# Patient Record
Sex: MALE | Race: WHITE | NOT HISPANIC OR LATINO | URBAN - METROPOLITAN AREA
[De-identification: names, ages, dates, MRNs, and addresses within clinical notes are randomized per-mention and may not be internally consistent; named-entity substitution may affect disease eponyms.]

---

## 2020-05-07 ENCOUNTER — INPATIENT (INPATIENT)
Facility: HOSPITAL | Age: 65
LOS: 7 days | Discharge: PSYCHIATRIC OR SUBSTANCE USE FACILITY | DRG: 641 | End: 2020-05-15
Attending: FAMILY MEDICINE | Admitting: HOSPITALIST
Payer: COMMERCIAL

## 2020-05-07 VITALS
DIASTOLIC BLOOD PRESSURE: 76 MMHG | RESPIRATION RATE: 18 BRPM | TEMPERATURE: 98 F | OXYGEN SATURATION: 95 % | HEART RATE: 119 BPM | SYSTOLIC BLOOD PRESSURE: 107 MMHG | WEIGHT: 160.06 LBS

## 2020-05-07 LAB
ALBUMIN SERPL ELPH-MCNC: 2.8 G/DL — LOW (ref 3.3–5)
ALP SERPL-CCNC: 39 U/L — LOW (ref 40–120)
ALT FLD-CCNC: 22 U/L — SIGNIFICANT CHANGE UP (ref 12–78)
ANION GAP SERPL CALC-SCNC: 11 MMOL/L — SIGNIFICANT CHANGE UP (ref 5–17)
APAP SERPL-MCNC: <2 UG/ML — LOW (ref 10–30)
AST SERPL-CCNC: 20 U/L — SIGNIFICANT CHANGE UP (ref 15–37)
BILIRUB SERPL-MCNC: 2.2 MG/DL — HIGH (ref 0.2–1.2)
BUN SERPL-MCNC: 24 MG/DL — HIGH (ref 7–23)
CALCIUM SERPL-MCNC: 7.6 MG/DL — LOW (ref 8.5–10.1)
CHLORIDE SERPL-SCNC: 111 MMOL/L — HIGH (ref 96–108)
CK MB CFR SERPL CALC: 1.6 NG/ML — SIGNIFICANT CHANGE UP (ref 0–3.6)
CO2 SERPL-SCNC: 21 MMOL/L — LOW (ref 22–31)
CREAT SERPL-MCNC: 1.2 MG/DL — SIGNIFICANT CHANGE UP (ref 0.5–1.3)
ETHANOL SERPL-MCNC: <10 MG/DL — SIGNIFICANT CHANGE UP (ref 0–10)
GLUCOSE SERPL-MCNC: 85 MG/DL — SIGNIFICANT CHANGE UP (ref 70–99)
LACTATE SERPL-SCNC: 1.9 MMOL/L — SIGNIFICANT CHANGE UP (ref 0.7–2)
LIDOCAIN IGE QN: 183 U/L — SIGNIFICANT CHANGE UP (ref 73–393)
MAGNESIUM SERPL-MCNC: 2.2 MG/DL — SIGNIFICANT CHANGE UP (ref 1.6–2.6)
POTASSIUM SERPL-MCNC: 3.8 MMOL/L — SIGNIFICANT CHANGE UP (ref 3.5–5.3)
POTASSIUM SERPL-SCNC: 3.8 MMOL/L — SIGNIFICANT CHANGE UP (ref 3.5–5.3)
PROT SERPL-MCNC: 6 G/DL — SIGNIFICANT CHANGE UP (ref 6–8.3)
SALICYLATES SERPL-MCNC: <1.7 MG/DL — LOW (ref 2.8–20)
SARS-COV-2 RNA SPEC QL NAA+PROBE: SIGNIFICANT CHANGE UP
SODIUM SERPL-SCNC: 143 MMOL/L — SIGNIFICANT CHANGE UP (ref 135–145)
TROPONIN I SERPL-MCNC: 0.02 NG/ML — SIGNIFICANT CHANGE UP (ref 0.01–0.04)

## 2020-05-07 PROCEDURE — 71250 CT THORAX DX C-: CPT | Mod: 26

## 2020-05-07 PROCEDURE — 70450 CT HEAD/BRAIN W/O DYE: CPT | Mod: 26

## 2020-05-07 PROCEDURE — 93010 ELECTROCARDIOGRAM REPORT: CPT

## 2020-05-07 PROCEDURE — 72125 CT NECK SPINE W/O DYE: CPT | Mod: 26

## 2020-05-07 PROCEDURE — 74176 CT ABD & PELVIS W/O CONTRAST: CPT | Mod: 26

## 2020-05-07 PROCEDURE — 99285 EMERGENCY DEPT VISIT HI MDM: CPT

## 2020-05-07 PROCEDURE — 99223 1ST HOSP IP/OBS HIGH 75: CPT | Mod: GC

## 2020-05-07 RX ORDER — SODIUM CHLORIDE 9 MG/ML
1000 INJECTION INTRAMUSCULAR; INTRAVENOUS; SUBCUTANEOUS ONCE
Refills: 0 | Status: COMPLETED | OUTPATIENT
Start: 2020-05-07 | End: 2020-05-07

## 2020-05-07 RX ADMIN — SODIUM CHLORIDE 1000 MILLILITER(S): 9 INJECTION INTRAMUSCULAR; INTRAVENOUS; SUBCUTANEOUS at 18:23

## 2020-05-07 NOTE — ED PROVIDER NOTE - CONSTITUTIONAL, MLM
normal... awake, alert, oriented to person, place, time/situation and in no apparent distress. dry mucous membrane unkept

## 2020-05-07 NOTE — ED ADULT NURSE REASSESSMENT NOTE - NS ED NURSE REASSESS COMMENT FT1
Pt is on a 1:1 for suicidal ideation. Pt is in tears and feels depressed. Pt is awaiting labs to be drawn and pending CT scan. VSS will reassess.

## 2020-05-07 NOTE — ED PROVIDER NOTE - OBJECTIVE STATEMENT
Pt is a 63 yo male with no known pmhx has not seen doctor in 30 years c/o of chills and generalized weakness for one month. Pt states doesn't have thermometer to check temperature denies any cough sob + diarrhea. Pt states he fell twice 3 weeks ago + dizziness denies any numbness tingling. Pt states he hasn't eaten in 2 days friends called to check on him and called 911. Pt states he has no covid exposures has not left the house for weeks. Pt c/o of headache.     pcp none

## 2020-05-07 NOTE — ED ADULT NURSE NOTE - OBJECTIVE STATEMENT
PT BIB EMS c/c of frequent falls, generalized weakness and general decline x 1 month. LLE decreased sensation. Per patient also notes feeling increasingly more depressed, w/ SI x weeks. Denies plan and psych history. 1:1 initiated, MD Aragon made aware. Room safety reviewed w/ both patient and 1:1 at bedside.

## 2020-05-07 NOTE — ED PROVIDER NOTE - CLINICAL SUMMARY MEDICAL DECISION MAKING FREE TEXT BOX
Pt is a 65 yo male with chills weakness will get ekg labs ct scan head neck chest abdomen will give fluids covid ordered

## 2020-05-07 NOTE — ED PROVIDER NOTE - ATTENDING CONTRIBUTION TO CARE
Pt seen and examined and d/w PA.  agree with a and p.  pt is a 63 yo male denies hx but hasn't seen md in 30 years. pt lives alone and states hasn't left house for month and friends have been bringing food. pt states now with few weeks of ha, chills and sob with no cough, doesn't have thermometer. pt also co falls and ? syncope x 1 a few days ago.  pts friends called him today and he didn't sound right so they called 911. pt states diffuse weakness.  denies cp.  on exam, pt upset, no resp distress and alert, oriented x 3.  nc/at, perrla, conj pink, neck supple, nt, lungs cta, cor: tachy s1s2, no mgr, abd with rlq ttp, no rash, neuro intact,  check ekg,  ivf, ro infection, check ua, labs,  covid.  pt also with syncope, check ct head, neck, trop,  rlq ttp, check ct abd,  reassess after results, likely admit as lives alone.

## 2020-05-07 NOTE — ED PROVIDER NOTE - CARE PLAN
Principal Discharge DX:	Syncope and collapse  Secondary Diagnosis:	Diarrhea, unspecified type  Secondary Diagnosis:	Weakness  Secondary Diagnosis:	Suicidal ideation

## 2020-05-08 DIAGNOSIS — Z90.49 ACQUIRED ABSENCE OF OTHER SPECIFIED PARTS OF DIGESTIVE TRACT: Chronic | ICD-10-CM

## 2020-05-08 DIAGNOSIS — L84 CORNS AND CALLOSITIES: ICD-10-CM

## 2020-05-08 DIAGNOSIS — F43.21 ADJUSTMENT DISORDER WITH DEPRESSED MOOD: ICD-10-CM

## 2020-05-08 DIAGNOSIS — D11.0 BENIGN NEOPLASM OF PAROTID GLAND: Chronic | ICD-10-CM

## 2020-05-08 DIAGNOSIS — R45.851 SUICIDAL IDEATIONS: ICD-10-CM

## 2020-05-08 DIAGNOSIS — Z29.9 ENCOUNTER FOR PROPHYLACTIC MEASURES, UNSPECIFIED: ICD-10-CM

## 2020-05-08 DIAGNOSIS — Z98.890 OTHER SPECIFIED POSTPROCEDURAL STATES: Chronic | ICD-10-CM

## 2020-05-08 DIAGNOSIS — R53.1 WEAKNESS: ICD-10-CM

## 2020-05-08 DIAGNOSIS — E80.6 OTHER DISORDERS OF BILIRUBIN METABOLISM: ICD-10-CM

## 2020-05-08 DIAGNOSIS — F32.9 MAJOR DEPRESSIVE DISORDER, SINGLE EPISODE, UNSPECIFIED: ICD-10-CM

## 2020-05-08 DIAGNOSIS — R55 SYNCOPE AND COLLAPSE: ICD-10-CM

## 2020-05-08 DIAGNOSIS — I82.431 ACUTE EMBOLISM AND THROMBOSIS OF RIGHT POPLITEAL VEIN: ICD-10-CM

## 2020-05-08 DIAGNOSIS — B35.1 TINEA UNGUIUM: ICD-10-CM

## 2020-05-08 DIAGNOSIS — B07.0 PLANTAR WART: ICD-10-CM

## 2020-05-08 DIAGNOSIS — I82.409 ACUTE EMBOLISM AND THROMBOSIS OF UNSPECIFIED DEEP VEINS OF UNSPECIFIED LOWER EXTREMITY: ICD-10-CM

## 2020-05-08 LAB
AMPHET UR-MCNC: NEGATIVE — SIGNIFICANT CHANGE UP
APPEARANCE UR: ABNORMAL
BARBITURATES UR SCN-MCNC: NEGATIVE — SIGNIFICANT CHANGE UP
BENZODIAZ UR-MCNC: NEGATIVE — SIGNIFICANT CHANGE UP
BILIRUB UR-MCNC: ABNORMAL
COCAINE METAB.OTHER UR-MCNC: NEGATIVE — SIGNIFICANT CHANGE UP
COLOR SPEC: YELLOW — SIGNIFICANT CHANGE UP
DIFF PNL FLD: ABNORMAL
GLUCOSE UR QL: NEGATIVE — SIGNIFICANT CHANGE UP
KETONES UR-MCNC: ABNORMAL
LEUKOCYTE ESTERASE UR-ACNC: ABNORMAL
METHADONE UR-MCNC: NEGATIVE — SIGNIFICANT CHANGE UP
NITRITE UR-MCNC: NEGATIVE — SIGNIFICANT CHANGE UP
OPIATES UR-MCNC: NEGATIVE — SIGNIFICANT CHANGE UP
PCP SPEC-MCNC: SIGNIFICANT CHANGE UP
PCP UR-MCNC: NEGATIVE — SIGNIFICANT CHANGE UP
PH UR: 5 — SIGNIFICANT CHANGE UP (ref 5–8)
PROT UR-MCNC: 30 MG/DL
SP GR SPEC: 1.02 — SIGNIFICANT CHANGE UP (ref 1.01–1.02)
THC UR QL: NEGATIVE — SIGNIFICANT CHANGE UP
UROBILINOGEN FLD QL: 4

## 2020-05-08 PROCEDURE — 11721 DEBRIDE NAIL 6 OR MORE: CPT | Mod: 59

## 2020-05-08 PROCEDURE — 99233 SBSQ HOSP IP/OBS HIGH 50: CPT

## 2020-05-08 PROCEDURE — 93970 EXTREMITY STUDY: CPT | Mod: 26

## 2020-05-08 PROCEDURE — 99253 IP/OBS CNSLTJ NEW/EST LOW 45: CPT

## 2020-05-08 PROCEDURE — 93880 EXTRACRANIAL BILAT STUDY: CPT | Mod: 26

## 2020-05-08 PROCEDURE — 99222 1ST HOSP IP/OBS MODERATE 55: CPT | Mod: 25

## 2020-05-08 PROCEDURE — 99223 1ST HOSP IP/OBS HIGH 75: CPT

## 2020-05-08 PROCEDURE — 11055 PARING/CUTG B9 HYPRKER LES 1: CPT

## 2020-05-08 RX ORDER — ENOXAPARIN SODIUM 100 MG/ML
40 INJECTION SUBCUTANEOUS DAILY
Refills: 0 | Status: DISCONTINUED | OUTPATIENT
Start: 2020-05-08 | End: 2020-05-08

## 2020-05-08 RX ORDER — MIRTAZAPINE 45 MG/1
15 TABLET, ORALLY DISINTEGRATING ORAL AT BEDTIME
Refills: 0 | Status: DISCONTINUED | OUTPATIENT
Start: 2020-05-08 | End: 2020-05-11

## 2020-05-08 RX ORDER — ENOXAPARIN SODIUM 100 MG/ML
70 INJECTION SUBCUTANEOUS EVERY 12 HOURS
Refills: 0 | Status: DISCONTINUED | OUTPATIENT
Start: 2020-05-08 | End: 2020-05-09

## 2020-05-08 RX ADMIN — MIRTAZAPINE 15 MILLIGRAM(S): 45 TABLET, ORALLY DISINTEGRATING ORAL at 21:45

## 2020-05-08 RX ADMIN — ENOXAPARIN SODIUM 70 MILLIGRAM(S): 100 INJECTION SUBCUTANEOUS at 17:24

## 2020-05-08 NOTE — H&P ADULT - NSICDXFAMILYHX_GEN_ALL_CORE_FT
FAMILY HISTORY:  Father  Still living? No  Family history of CVA, Age at diagnosis: Age Unknown  Family history of myocardial infarction, Age at diagnosis: Age Unknown  Family history of type 2 diabetes mellitus, Age at diagnosis: Age Unknown

## 2020-05-08 NOTE — H&P ADULT - PROBLEM SELECTOR PLAN 4
Patient without any known hx of psychiatric pathology, now with what appears to be severe depressive episode  with admitted suicidal ideation, poor PO intake, unable to perform daily activities, and difficulty sleeping  -PHQ9 score 27 - indicative of severe depression, patient may require pharmacologic intervention  -Has anxiety regarding multiple life stressors - living situation, money, recent loss of sister  -Psych (Dr Conley) consulted - f/u recs

## 2020-05-08 NOTE — H&P ADULT - PROBLEM SELECTOR PLAN 2
Patient presenting with chills, weakness, occasional diarrhea since 3/30/20, after visiting a pharmacy, but has had extremely limited outside contact since that time as he has not left home  -COVID19 PCR negative and COVID ruled out

## 2020-05-08 NOTE — H&P ADULT - PROBLEM SELECTOR PLAN 6
Patient noted to have overgrown toenails of b/l feet, thick and discolored, c/w onychomycosis  -patient will likely require assistance in trimming toenails  -Podiatry consulted, f/u recs

## 2020-05-08 NOTE — H&P ADULT - NSHPPHYSICALEXAM_GEN_ALL_CORE
Vital Signs Last 24 Hrs  T(C): 37.2 (07 May 2020 23:33), Max: 37.2 (07 May 2020 23:33)  T(F): 98.9 (07 May 2020 23:33), Max: 98.9 (07 May 2020 23:33)  HR: 104 (07 May 2020 23:33) (104 - 119)  BP: 92/62 (07 May 2020 23:33) (92/62 - 107/76)  BP(mean): --  RR: 18 (07 May 2020 23:33) (18 - 18)  SpO2: 93% (07 May 2020 23:33) (93% - 95%)    Physical Exam:  General: unkempt, well nourished, NAD  HEENT: NCAT, PERRLA, EOMI bl, moist mucous membranes   Neck: Supple, nontender, no mass  Neurology: A&Ox3, nonfocal, CN II-XII grossly intact, sensation diminished b/l feet to light touch, Motor 4/5 RLE, 5/5 LLE  Respiratory: CTA B/L, No W/R/R  CV: RRR, +S1/S2, no murmurs, rubs or gallops  Abdominal: Soft, NT, ND +BSx4  Extremities: No LE edema, 2+ peripheral pulses b/l radial, DP, PT. B/l toenails overgrown, thickened, discolored. Plantar surface of the right 3rd toe with hyperkeratotic lesion consistent with veruca plantaris  MSK: Normal ROM, no joint erythema or warmth, no joint swelling   Skin: warm, dry,

## 2020-05-08 NOTE — CONSULT NOTE ADULT - PROBLEM SELECTOR RECOMMENDATION 2
I have discussed the treatment options with the patient and have debrided the lesion full thickness, recommended use of Vaseline or similar product to decrease friction.  If these conservative measures fail to relieve symptoms we could always correct the deformity surgically and will discuss that option if necessary.

## 2020-05-08 NOTE — BEHAVIORAL HEALTH ASSESSMENT NOTE - NSBHCHARTREVIEWINVESTIGATE_PSY_A_CORE FT
< from: 12 Lead ECG (05.07.20 @ 17:29) >    Ventricular Rate 113 BPM    Atrial Rate 113 BPM    P-R Interval 130 ms    QRS Duration 72 ms    Q-T Interval 368 ms    QTC Calculation(Bezet) 504 ms    P Axis -1 degrees    R Axis 0 degrees    T Axis 0 degrees    Diagnosis Line Sinus tachycardia  Junctional ST depression, probably normal  Borderline ECG  No previous ECGs available  Confirmed by Rosibel MOBLEY, Aneudy ([id number]) on 5/8/2020 1:36:50 PM    < end of copied text >

## 2020-05-08 NOTE — DIETITIAN INITIAL EVALUATION ADULT. - OTHER INFO
64/M without any significant known PMHx who presented to the ED c/o worsening weakness over the past 1 month with associated chills, and syncope, admitted for r/o COVID19 and syncope workup. Also found to have depression and suicidal ideation with significant social concerns.  Pt lives alone in an apartment.  Pt does not cook or shop for food.  All meals have historically been eaten out or brought back to home.  [employer], [employer], Italian restaurants, etc.  Due to COVID pandemic pt has not been able to go out for food.  When asked why he didn't have food delivered, he stated deliverymen are not able to get into his building.  He lives on the 2nd floor and there is locked area that they cannot get past.  He has been unable to walk far to get out.  Said  brought him a box of Cheerios and he ate that sometimes.  Drinks a lot of water.  Eventually just stopped wanting to eat much. Pt does not know current wt. Stated thinks he weighed ~190# at one point last year. Cannot discern if overall weight loss was gradual or more in recent months due to environmental /social issues brought about by the pandemic.  Pt appears disheveled, overgrown beard.  No obvious signs of muscle wasting, no LE edema.  Would like additional pt wt to verify admit wt.

## 2020-05-08 NOTE — CONSULT NOTE ADULT - ASSESSMENT
Right leg DVT probably provoked related to immobility.    No evidence of malignancy on scans  family history notable for sister that may have past away from PE after a extremity stent procedure?  Depression with ?suicidal ideation    Recommendations:  1.  continue lovenox and would bridge to DOAC when stable.  However concern with compliance and followup and risks of toxicity  2.  because of family history would consider hypercoaguability workup as an outpatient  3.  with first event and current history would treat for 3 months with repeat evaluation with doppler and didimer  4.  further heme recommendations pending above

## 2020-05-08 NOTE — H&P ADULT - PROBLEM SELECTOR PLAN 8
IMPROVE VTE Individual Risk Assessment          RISK                                                          Points  [  ] Previous VTE                                                3  [  ] Thrombophilia                                            2  [ x ] Lower limb paralysis                                  2        (unable to hold up >15 seconds)    [  ] Current Cancer                                            2         (within 6 months)  [  ] Immobilization > 24 hrs                             1  [  ] ICU/CCU stay > 24 hours                           1  [ x ] Age > 60                                                        1    IMPROVE VTE Score: 3  moderate risk, start on pharm VTE ppx with lovenox 40mg qd  -Social work consult - living situation, possible need for medicare  -PT consult  -nutritional services consult

## 2020-05-08 NOTE — H&P ADULT - NSHPREVIEWOFSYSTEMS_GEN_ALL_CORE
Constitutional: denies fever, diaphoresis, admits to chills  HEENT: denies blurry vision, difficulty hearing  Respiratory: denies SOB, PARDO, cough, sputum production, wheezing, hemoptysis  Cardiovascular: denies chest pain, palpitations, edema  Gastrointestinal: denies nausea, vomiting, constipation, abdominal pain, melena, hematochezia, admits to occasional diarrhea   Genitourinary: denies dysuria, frequency, urgency, hematuria   Skin/Breast: denies rash, itching  Musculoskeletal: denies myalgias, joint swelling, admits to muscle weakness  Neurologic: denies headache, admits to LE weakness, paresthesias of the toes, dizziness  Psychiatric: Admits to feeling anxious, depressed, suicidal, denies homicidal thoughts  Hematology/Oncology: denies bruising, tender or enlarged lymph nodes   ROS negative except as noted above

## 2020-05-08 NOTE — CONSULT NOTE ADULT - SUBJECTIVE AND OBJECTIVE BOX
Chief Complaint:  Patient is a 64y old  Male who presents with a chief complaint of Weakness (08 May 2020 07:46)    No pertinent past medical history  History of vocal cord polypectomy  Benign parotid tumor  History of appendectomy     HPI:  Patient is a 64/M without any significant known PMHx who presented to the ED c/o worsening weakness over the past 1 month with associated chills that he reports began on 3/30/20. Since that time he reports progressive weakness, fatigue, laying in bed for the majority of the day, difficulty with ambulation, and poor PO intake as he reports he no longer felt he had the desire to eat. He denies any associated SOB, cough, PARDO, but reports difficulty with ambulation as he feels his legs are weak. He reports a fall approx 2 weeks ago which occurred in his apartment which was preceded by dizziness which was described as a feeling of unsteadiness, in which he lost consciousness for an unknown period of time and awoke on the floor. denies any preceding palpitations, SOB, or chest pain. Of note, the patient has not been evaluated by a physician in approx 30 years. He attributes his dizziness and syncope to poor PO intake, often citing during the interview that he has "lost the will to live," and would "be better off dead." He denies any concrete plan to commit suicide, and denies any access to firearms in the home. He reports occasional diarrhea, approx 1x/week, but also has normal formed stools, currently denies any diarrhea. Denies any abdominal pain, cramping, nausea or vomiting. Denies any recent travel or sick contacts.     The date the pt first felt unwell: 3/30/2020  Fever or chills: yes [x  ] chills   no [  ]   - Tmax:   Fatigue, malaise or generalized weakness: yes [ x ]   no [  ]  Shortness of breath/dyspnea: yes [  ]   no [ x ]  Cough: yes [  ]   no [ x ], sputum production: yes [  ]   no [  ]  Blood in sputum: yes [  ]   no [ x ]  Anorexia/po intolerance: yes [x  ]   no [  ]  Chest pain or chest tightness: yes [  ]   no [ x ]  Edema in legs: yes [  ]   no [x  ]  Myalgias: yes [  ]   no [x  ]  Headache: yes [  ]   no [x  ]  Sore throat: yes [  ]   no [ x ]  Rhinorrhea: yes [  ]   no [x  ]  Abd pain: yes [  ]   no [x  ]  Nausea: yes [  ]   no [x  ]  Vomiting: yes [  ]   no [x  ]  Diarrhea: yes [x  ] occasional   no [  ]  Skin rashes: yes [  ]   no [ x ]  Loss of sense of smell/anosmia: yes [  ]   no [ x ]  Conjunctivitis: yes [  ]   no [ x ]  Recent travel: yes [  ]   no [ x ] - Location:   Any sick contacts: yes [  ]   no [ x ]  Close contact with someone confirmed positive with COVID-19 / SARS-CoV2 in the last 14 days: yes [  ]   no [x  ]  Code status: Full    ED Vitals:   T 98.9  BP 92/62 RR 18 SpO2 93% on RA  Labs: CBC wnl, No electrolyte abnormalities, TBili 2.2, troponin negative x 1, blood EtOH, tylenol, ASA level wnl, COVID19 PCr negative.  CT Chest/AP: No traumatic injury. Extensive nonobstructing left renal stones, including a partial staghorn calculus.  CT Head: No acute intracranial hemorrhage  CT C spine: Degenerative changes, no acute fracture  EKG: Sinus tachycardia at 110  In the ED Patient received 2L NS bolus (08 May 2020 00:50)      gi consulted for elevated lfts. chart reviewed. pt seen and examined      recent vs/labs/imaging reviewed - afebrile tachy soft bps  plts 140  no coags   tbili 2.2 not fractionated  utox/etoh/apap/salicylate neg  c19  neg  nc ct ap as below    No Known Allergies      enoxaparin Injectable 40 milliGRAM(s) SubCutaneous daily        FAMILY HISTORY:  Family history of CVA (Father)  Family history of myocardial infarction (Father)  Family history of type 2 diabetes mellitus (Father)        Review of Systems:    General:  weakness  Eyes:  Good vision, no reported pain  ENT:  No sore throat, pain, runny nose, dysphagia  CV:  No pain, palpitations, no lightheadedness  Resp:  No dyspnea, cough, tachypnea, wheezing  GI: see above  :  No pain, bleeding, incontinence, nocturia  Muscle:  No pain, weakness  Neuro:  No weakness, tingling, memory problems  Psych:  No fatigue, insomnia, mood problems, depression  Endocrine:  No polyuria, polydypsia, cold/heat intolerance  Heme:  No petechiae, ecchymosis, easy bruisability  Skin:  No rash, tattoos, scars, edema    Relevant Family History:   n/c    Relevant Social History: n/c      Physical Exam:    Vital Signs:  Vital Signs Last 24 Hrs  T(C): 36.6 (08 May 2020 05:07), Max: 37.2 (07 May 2020 23:33)  T(F): 97.9 (08 May 2020 05:07), Max: 98.9 (07 May 2020 23:33)  HR: 94 (08 May 2020 07:41) (94 - 119)  BP: 96/68 (08 May 2020 05:07) (92/62 - 107/76)  BP(mean): --  RR: 18 (08 May 2020 05:07) (18 - 18)  SpO2: 99% (08 May 2020 07:41) (93% - 99%)  Daily     Daily     General:  Appears stated age, well-groomed, nad  HEENT:  NC/AT,  conjunctivae clear and pink, no thyromegaly, nodules, adenopathy, no JVD  Chest:  Full & symmetric excursion, no increased effort, breath sounds clear  Cardiovascular:  Regular rhythm, S1, S2, no murmur/rub/S3/S4, no abdominal bruit, no edema  Abdomen:  Soft, non-tender, non-distended, normoactive bowel sounds,  no masses ,no hepatosplenomeagaly, no signs of chronic liver disease  Extremities:  no cyanosis,clubbing or edema  Skin:  No rash/erythema/ecchymoses/petechiae/wounds/abscess/warm/dry  Neuro/Psych:  A&O  , no asterixis, no tremor, no encephalopathy    Laboratory:                            14.4   10.48 )-----------( 140      ( 07 May 2020 20:12 )             41.7     05-07    143  |  111<H>  |  24<H>  ----------------------------<  85  3.8   |  21<L>  |  1.20    Ca    7.6<L>      07 May 2020 20:12  Mg     2.2     05-07    TPro  6.0  /  Alb  2.8<L>  /  TBili  2.2<H>  /  DBili  x   /  AST  20  /  ALT  22  /  AlkPhos  39<L>  05-07    LIVER FUNCTIONS - ( 07 May 2020 20:12 )  Alb: 2.8 g/dL / Pro: 6.0 g/dL / ALK PHOS: 39 U/L / ALT: 22 U/L / AST: 20 U/L / GGT: x             Urinalysis Basic - ( 08 May 2020 04:04 )    Color: Yellow / Appearance: Turbid / S.020 / pH: x  Gluc: x / Ketone: Small  / Bili: Small / Urobili: 4   Blood: x / Protein: 30 mg/dL / Nitrite: Negative   Leuk Esterase: Trace / RBC: 11-25 /HPF / WBC 3-5   Sq Epi: x / Non Sq Epi: Negative / Bacteria: Few      Amylase Serum--      Lipase [id number]       Ammonia--    Imaging:    < from: CT Abdomen and Pelvis No Cont (20 @ 20:32) >    EXAM:  CT CHEST                          EXAM:  CT ABDOMEN AND PELVIS                            PROCEDURE DATE:  2020          INTERPRETATION:  CLINICAL INFORMATION: Status post fall    COMPARISON: None.    PROCEDURE:   CT of the Chest, Abdomen and Pelvis was performed without intravenous contrast.   Intravenous contrast: None.  Oral contrast: None.  Sagittal and coronal reformats were performed.    FINDINGS:    CHEST:     LUNGS AND LARGE AIRWAYS: Patent central airways. No pulmonary nodules. Mild bilateral lower lobe subsegmental atelectasis.  PLEURA: No pleural effusion.  VESSELS: Normal caliber aorta. Mild coronary artery calcification.  HEART: Heart size is normal. No pericardial effusion.  MEDIASTINUM AND SELVIN: No lymphadenopathy.  CHEST WALL AND LOWER NECK: Within normal limits.    ABDOMEN AND PELVIS:    LIVER: Within normal limits.  BILE DUCTS: Normal caliber.  GALLBLADDER: Multiple gallstones.  SPLEEN: Within normal limits.  PANCREAS: Within normal limits.  ADRENALS: Within normal limits.  KIDNEYS/URETERS: 1.9 cm nonobstructing left upper pole renal stone. Partial staghorn left renal calculus with multiple small stones versus fragmented larger stone in the left renal pelvis. No hydronephrosis. 2.7 cm hypodense lesion in the posterior aspect of the left kidney which is unable to be characterized on this unenhanced CT scan.    BLADDER: Within normal limits.  REPRODUCTIVE ORGANS: Prostate within normal limits.    BOWEL: No bowel obstruction. Appendix not identified. Mild colonic diverticulosis.  PERITONEUM: No ascites or hemoperitoneum.  VESSELS: Mild atherosclerotic arterial calcifications.  RETROPERITONEUM/LYMPH NODES: No lymphadenopathy.    ABDOMINAL WALL: Within normal limits.  BONES: No fractures.    IMPRESSION:     No traumatic injury.  Extensive nonobstructing left renal stones, including a partial staghorn calculus.                    WILLIAMS GARDNER M.D.,ATTENDING RADIOLOGIST  This document has been electronically signed. May  7 2020  9:05PM                < end of copied text > Chief Complaint:  Patient is a 64y old  Male who presents with a chief complaint of Weakness (08 May 2020 07:46)    No pertinent past medical history  History of vocal cord polypectomy  Benign parotid tumor  History of appendectomy     HPI:  Patient is a 64/M without any significant known PMHx who presented to the ED c/o worsening weakness over the past 1 month with associated chills that he reports began on 3/30/20. Since that time he reports progressive weakness, fatigue, laying in bed for the majority of the day, difficulty with ambulation, and poor PO intake as he reports he no longer felt he had the desire to eat. He denies any associated SOB, cough, PARDO, but reports difficulty with ambulation as he feels his legs are weak. He reports a fall approx 2 weeks ago which occurred in his apartment which was preceded by dizziness which was described as a feeling of unsteadiness, in which he lost consciousness for an unknown period of time and awoke on the floor. denies any preceding palpitations, SOB, or chest pain. Of note, the patient has not been evaluated by a physician in approx 30 years. He attributes his dizziness and syncope to poor PO intake, often citing during the interview that he has "lost the will to live," and would "be better off dead." He denies any concrete plan to commit suicide, and denies any access to firearms in the home. He reports occasional diarrhea, approx 1x/week, but also has normal formed stools, currently denies any diarrhea. Denies any abdominal pain, cramping, nausea or vomiting. Denies any recent travel or sick contacts.     The date the pt first felt unwell: 3/30/2020  Fever or chills: yes [x  ] chills   no [  ]   - Tmax:   Fatigue, malaise or generalized weakness: yes [ x ]   no [  ]  Shortness of breath/dyspnea: yes [  ]   no [ x ]  Cough: yes [  ]   no [ x ], sputum production: yes [  ]   no [  ]  Blood in sputum: yes [  ]   no [ x ]  Anorexia/po intolerance: yes [x  ]   no [  ]  Chest pain or chest tightness: yes [  ]   no [ x ]  Edema in legs: yes [  ]   no [x  ]  Myalgias: yes [  ]   no [x  ]  Headache: yes [  ]   no [x  ]  Sore throat: yes [  ]   no [ x ]  Rhinorrhea: yes [  ]   no [x  ]  Abd pain: yes [  ]   no [x  ]  Nausea: yes [  ]   no [x  ]  Vomiting: yes [  ]   no [x  ]  Diarrhea: yes [x  ] occasional   no [  ]  Skin rashes: yes [  ]   no [ x ]  Loss of sense of smell/anosmia: yes [  ]   no [ x ]  Conjunctivitis: yes [  ]   no [ x ]  Recent travel: yes [  ]   no [ x ] - Location:   Any sick contacts: yes [  ]   no [ x ]  Close contact with someone confirmed positive with COVID-19 / SARS-CoV2 in the last 14 days: yes [  ]   no [x  ]  Code status: Full    ED Vitals:   T 98.9  BP 92/62 RR 18 SpO2 93% on RA  Labs: CBC wnl, No electrolyte abnormalities, TBili 2.2, troponin negative x 1, blood EtOH, tylenol, ASA level wnl, COVID19 PCr negative.  CT Chest/AP: No traumatic injury. Extensive nonobstructing left renal stones, including a partial staghorn calculus.  CT Head: No acute intracranial hemorrhage  CT C spine: Degenerative changes, no acute fracture  EKG: Sinus tachycardia at 110  In the ED Patient received 2L NS bolus (08 May 2020 00:50)      gi consulted for elevated lfts. chart reviewed. pt seen and examined. came to hospital for weakness. w prior diarrhea but now resolved. does endorse ~10# wt loss over past few months 2/ dec po. denies any prior liver/gb dz, recent travel, otc/herbal meds, etoh use. denies n/v/c/abd pain/no overt melena/brbpr. has never had egd/colon. hx of appy. fhx nc for gi tract sheri. meds nc. denies toxic habits. upon eval, emotional but states he feels a little better, going to attempt to eat breakfast      recent vs/labs/imaging reviewed - afebrile tachy soft bps  plts 140  no coags   tbili 2.2 not fractionated  utox/etoh/apap/salicylate neg  c19  neg  nc ct ap as below    No Known Allergies      enoxaparin Injectable 40 milliGRAM(s) SubCutaneous daily        FAMILY HISTORY:  Family history of CVA (Father)  Family history of myocardial infarction (Father)  Family history of type 2 diabetes mellitus (Father)        Review of Systems:    General:  weakness wt loss dec po  Eyes:  Good vision, no reported pain  ENT:  No sore throat, pain, runny nose, dysphagia  CV:  No pain, palpitations, no lightheadedness  Resp:  No dyspnea, cough, tachypnea, wheezing  GI: see above  :  No pain, bleeding, incontinence, nocturia  Muscle:  No pain, weakness  Neuro:  No weakness, tingling, memory problems  Psych:  No fatigue, insomnia, mood problems, depression  Endocrine:  No polyuria, polydypsia, cold/heat intolerance  Heme:  No petechiae, ecchymosis, easy bruisability  Skin:  No rash, tattoos, scars, edema    Relevant Family History:   n/c    Relevant Social History: n/c      Physical Exam:    Vital Signs:  Vital Signs Last 24 Hrs  T(C): 36.6 (08 May 2020 05:07), Max: 37.2 (07 May 2020 23:33)  T(F): 97.9 (08 May 2020 05:07), Max: 98.9 (07 May 2020 23:33)  HR: 94 (08 May 2020 07:41) (94 - 119)  BP: 96/68 (08 May 2020 05:07) (92/62 - 107/76)  BP(mean): --  RR: 18 (08 May 2020 05:07) (18 - 18)  SpO2: 99% (08 May 2020 07:41) (93% - 99%)  Daily     Daily     General:  nad  HEENT:  NC/AT  Abdomen:  Soft, non-tender, mild dt  Extremities:  no edema  Neuro/Psych:  A&Ox3    Laboratory:                            14.4   10.48 )-----------( 140      ( 07 May 2020 20:12 )             41.7     05-07    143  |  111<H>  |  24<H>  ----------------------------<  85  3.8   |  21<L>  |  1.20    Ca    7.6<L>      07 May 2020 20:12  Mg     2.2         TPro  6.0  /  Alb  2.8<L>  /  TBili  2.2<H>  /  DBili  x   /  AST  20  /  ALT  22  /  AlkPhos  39<L>  05-07    LIVER FUNCTIONS - ( 07 May 2020 20:12 )  Alb: 2.8 g/dL / Pro: 6.0 g/dL / ALK PHOS: 39 U/L / ALT: 22 U/L / AST: 20 U/L / GGT: x             Urinalysis Basic - ( 08 May 2020 04:04 )    Color: Yellow / Appearance: Turbid / S.020 / pH: x  Gluc: x / Ketone: Small  / Bili: Small / Urobili: 4   Blood: x / Protein: 30 mg/dL / Nitrite: Negative   Leuk Esterase: Trace / RBC: 11-25 /HPF / WBC 3-5   Sq Epi: x / Non Sq Epi: Negative / Bacteria: Few      Amylase Serum--      Lipase [id number]       Ammonia--    Imaging:    < from: CT Abdomen and Pelvis No Cont (20 @ 20:32) >    EXAM:  CT CHEST                          EXAM:  CT ABDOMEN AND PELVIS                            PROCEDURE DATE:  2020          INTERPRETATION:  CLINICAL INFORMATION: Status post fall    COMPARISON: None.    PROCEDURE:   CT of the Chest, Abdomen and Pelvis was performed without intravenous contrast.   Intravenous contrast: None.  Oral contrast: None.  Sagittal and coronal reformats were performed.    FINDINGS:    CHEST:     LUNGS AND LARGE AIRWAYS: Patent central airways. No pulmonary nodules. Mild bilateral lower lobe subsegmental atelectasis.  PLEURA: No pleural effusion.  VESSELS: Normal caliber aorta. Mild coronary artery calcification.  HEART: Heart size is normal. No pericardial effusion.  MEDIASTINUM AND SELVIN: No lymphadenopathy.  CHEST WALL AND LOWER NECK: Within normal limits.    ABDOMEN AND PELVIS:    LIVER: Within normal limits.  BILE DUCTS: Normal caliber.  GALLBLADDER: Multiple gallstones.  SPLEEN: Within normal limits.  PANCREAS: Within normal limits.  ADRENALS: Within normal limits.  KIDNEYS/URETERS: 1.9 cm nonobstructing left upper pole renal stone. Partial staghorn left renal calculus with multiple small stones versus fragmented larger stone in the left renal pelvis. No hydronephrosis. 2.7 cm hypodense lesion in the posterior aspect of the left kidney which is unable to be characterized on this unenhanced CT scan.    BLADDER: Within normal limits.  REPRODUCTIVE ORGANS: Prostate within normal limits.    BOWEL: No bowel obstruction. Appendix not identified. Mild colonic diverticulosis.  PERITONEUM: No ascites or hemoperitoneum.  VESSELS: Mild atherosclerotic arterial calcifications.  RETROPERITONEUM/LYMPH NODES: No lymphadenopathy.    ABDOMINAL WALL: Within normal limits.  BONES: No fractures.    IMPRESSION:     No traumatic injury.  Extensive nonobstructing left renal stones, including a partial staghorn calculus.                    WILLIAMS GARDNER M.D.,ATTENDING RADIOLOGIST  This document has been electronically signed. May  7 2020  9:05PM                < end of copied text >

## 2020-05-08 NOTE — CONSULT NOTE ADULT - ASSESSMENT
Patient admitted with progressive weakness and failure to thrive, possibly from a recent viral infection although COVID testing is negative. He had a possible episode of syncope which sounds vasovagal/dehydration related several weeks ago. He has no evidence for an acute cardiac process and no evidence for dysrhythmias or valvular disease. He has a documented right deep venous thrombosis which appears unrelated to his clinical presentation.    Recommend    Continuing anticoagulation for deep venous thrombosis     neurology evaluation    Continue telemetry    Await echocardiography to evaluate for any underlying structural heart disease    Further management can be dependent on his clinical course

## 2020-05-08 NOTE — BEHAVIORAL HEALTH ASSESSMENT NOTE - NSBHCHARTREVIEWIMAGING_PSY_A_CORE FT
< from: CT Chest No Cont (05.07.20 @ 20:34) >    MPRESSION:     No traumatic injury.  Extensive nonobstructing left renal stones, including a partial staghorn calculus.                    WILLIAMS GARDNER M.D.,ATTENDING RADIOLOGIST  This document has been electronically signed. May  7 2020  9:05PM    < end of copied text >

## 2020-05-08 NOTE — CONSULT NOTE ADULT - PROBLEM SELECTOR RECOMMENDATION 9
The offending nail margins were mechanically and electrically debrided to the level of normal underlying nail bed with good relief obtained. The patient was instructed to attempt to maintain the length and thickness of their nails as best as possible.

## 2020-05-08 NOTE — CONSULT NOTE ADULT - SUBJECTIVE AND OBJECTIVE BOX
HPI:  64y year old Male seen at [facility] [id number] W1 for elongated, dystrophic, discolored nails, as well as plantar verruca to the right foot. Patient relates he has not seen a podiatrist in a long time and denies any other complaints at this time. Denies any fever, chills, nausea, vomiting, chest pain, shortness of breath, or calf pain at this time.    Review of Systems:  Constitutional: denies fever, diaphoresis, admits to chills  	HEENT: denies blurry vision, difficulty hearing  	Respiratory: denies SOB, PARDO, cough, sputum production, wheezing, hemoptysis  	Cardiovascular: denies chest pain, palpitations, edema  	Gastrointestinal: denies nausea, vomiting, constipation, abdominal pain, melena, hematochezia, admits to occasional diarrhea   	Genitourinary: denies dysuria, frequency, urgency, hematuria   	Skin/Breast: denies rash, itching  	Musculoskeletal: denies myalgias, joint swelling, admits to muscle weakness  	Neurologic: denies headache, admits to LE weakness, paresthesias of the toes, dizziness  	Psychiatric: Admits to feeling anxious, depressed, suicidal, denies homicidal thoughts  	Hematology/Oncology: denies bruising, tender or enlarged lymph nodes   ROS negative except as noted above    PAST MEDICAL & SURGICAL HISTORY:  No pertinent past medical history  History of vocal cord polypectomy  Benign parotid tumor: 1983  History of appendectomy: 1965    Allergies  No Known Allergies    MEDICATIONS  (STANDING):  enoxaparin Injectable 70 milliGRAM(s) SubCutaneous every 12 hours  mirtazapine 15 milliGRAM(s) Oral at bedtime  PARoxetine 20 milliGRAM(s) Oral daily    MEDICATIONS  (PRN):    Social History:  Tobacco: never smoker  EtOH: denies  Recreational drug use: denies  Lives: alone, in apartment, never   Occupation: [employer] (retired 2012)  Ambulates/ADLs: independently, without assistive devices at baseline  Vaccinations: never received seasonal flu, PNA, shingles vaccines  No recent travel (08 May 2020 00:50)    FAMILY HISTORY:  Family history of CVA (Father)  Family history of myocardial infarction (Father)  Family history of type 2 diabetes mellitus (Father)    Vital Signs Last 24 Hrs  T(C): 36.5 (08 May 2020 12:49), Max: 37.2 (07 May 2020 23:33)  T(F): 97.7 (08 May 2020 12:49), Max: 98.9 (07 May 2020 23:33)  HR: 103 (08 May 2020 12:49) (94 - 119)  BP: 96/60 (08 May 2020 12:49) (92/62 - 107/76)  BP(mean): --  RR: 18 (08 May 2020 12:49) (18 - 18)  SpO2: 94% (08 May 2020 12:49) (93% - 99%)      PHYSICAL EXAM:  Vascular: DP & PT palpable bilaterally, Capillary refill 3 seconds, Digital hair present bilaterally  Neurological: Light touch sensation intact bilaterally  Musculoskeletal: 5/5 strength in all quadrants bilaterally, AJ & STJ ROM intact  Dermatological: Digital nails 1-5 bilaterally are elongated, thickened, ridged, lysing, and present with friable subungual debris which, after debridement to underlying nail bed, reveals a characteristic fungal/yeast/mold odor and consistency. There is no surrounding cellulitis, deep incurvation, or evidence of bacterial infection. Hyperkeratotic lesion noted to plantar aspect of the right 3rd digit with no punctate bleeding/thrombotic capillaries upon debridement, no pain on side to side compression and pain with direct palpation.                          14.4   10.48 )-----------( 140      ( 07 May 2020 20:12 )             41.7     05-07    143  |  111<H>  |  24<H>  ----------------------------<  85  3.8   |  21<L>  |  1.20    Ca    7.6<L>      07 May 2020 20:12  Mg     2.2     05-07    TPro  6.0  /  Alb  2.8<L>  /  TBili  2.2<H>  /  DBili  x   /  AST  20  /  ALT  22  /  AlkPhos  39<L>  05-07

## 2020-05-08 NOTE — H&P ADULT - NSICDXPASTSURGICALHX_GEN_ALL_CORE_FT
PAST SURGICAL HISTORY:  Benign parotid tumor 1983    History of appendectomy 1965    History of vocal cord polypectomy

## 2020-05-08 NOTE — DIETITIAN INITIAL EVALUATION ADULT. - PROBLEM SELECTOR PLAN 1
Patient presenting with worsening weakness, fatigue, over the past 1 month with 2 admitted falls in his home approx 2 weeks prior to admission with associated LOC and preceding dizziness. No associated chest pain, palpitations, SOB, vision changes  -Patient attributes to Poor PO intake. Has not occurred in ~ 2 weeks  -S/p 2L NS bolus in ED, encourage PO hydration  -CT head negative for ICH or gross structural abnormality, CT cspine without acute fracture  -Labs wnl, unlikely metabolic component.   -Patient without any outpatient followup ~ 30 years, likely with some chronic unaddressed issues  -Check carotid dopplers, LE dopplers, ECHO  -Check B12, folate, TSH, Lipid profile, HbA1c  -Cardio ([facility]) consulted, f/u recs

## 2020-05-08 NOTE — H&P ADULT - ASSESSMENT
64/M without any significant known PMHx who presented to the ED c/o worsening weakness over the past 1 month with associated chills, and syncope, admitted for r/o COVID19 and syncope workup. Also found to have depression and suicidal ideation with significant social concerns.

## 2020-05-08 NOTE — PHYSICAL THERAPY INITIAL EVALUATION ADULT - PERTINENT HX OF CURRENT PROBLEM, REHAB EVAL
Patient is a 64/M without any significant known PMHx who presented to the ED c/o worsening weakness over the past 1 month with associated chills that he reports began on 3/30/20. Since that time he reports progressive weakness, fatigue, laying in bed for the majority of the day, difficulty with ambulation, and poor PO intake as he reports he no longer felt he had the desire to eat.

## 2020-05-08 NOTE — BEHAVIORAL HEALTH ASSESSMENT NOTE - NSBHCHARTREVIEWLAB_PSY_A_CORE FT
14.4   10.48 )-----------( 140      ( 07 May 2020 20:12 )             41.7   05-07    143  |  111<H>  |  24<H>  ----------------------------<  85  3.8   |  21<L>  |  1.20    Ca    7.6<L>      07 May 2020 20:12  Mg     2.2     05-07    TPro  6.0  /  Alb  2.8<L>  /  TBili  2.2<H>  /  DBili  x   /  AST  20  /  ALT  22  /  AlkPhos  39<L>  05-07

## 2020-05-08 NOTE — PROGRESS NOTE ADULT - PROBLEM SELECTOR PLAN 1
Patient presenting with worsening weakness, fatigue, over the past 1 month with 2 admitted falls in his home approx 2 weeks prior to admission with associated LOC and preceding dizziness. No associated chest pain, palpitations, SOB, vision changes  -Patient attributes to Poor PO intake. Has not occurred in ~ 2 weeks  -S/p 2L NS bolus in ED, encourage PO hydration  -CT head negative for ICH or gross structural abnormality, CT cspine without acute fracture  -Labs wnl, unlikely metabolic component.   -Patient without any outpatient followup ~ 30 years, likely with some chronic unaddressed issues  -Check carotid dopplers, LE dopplers, ECHO  -Check B12, folate, TSH, Lipid profile, HbA1c  -Cardio ([facility]) consulted, f/u recs Patient presenting with worsening weakness, fatigue, over the past 1 month with 2 admitted falls in his home approx 2 weeks prior to admission with associated LOC and preceding dizziness. No associated chest pain, palpitations, SOB, vision changes  -Patient attributes to Poor PO intake. Has not occurred in ~ 2 weeks  -S/p 2L NS bolus in ED, encourage PO hydration  -CT head negative for ICH or gross structural abnormality, CT cspine without acute fracture  -Labs wnl, unlikely metabolic component.   -Patient without any outpatient followup ~ 30 years, likely with some chronic unaddressed issues  -F/u  carotid dopplers,  ECHO  -F/u B12, folate, TSH, Lipid profile, HbA1c  -Cardio ([facility]) consulted, f/u recs  - Neuro, cardio consults

## 2020-05-08 NOTE — H&P ADULT - HISTORY OF PRESENT ILLNESS
Patient is a 64/M without any significant known PMHx who presented to the ED c/o worsening weakness over the past 1 month with associated chills that he reports began on 3/30/20. Since that time he reports progressive weakness, fatigue, laying in bed for the majority of the day, difficulty with ambulation, and poor PO intake as he reports he no longer felt he had the desire to eat. He denies any associated SOB, cough, PARDO, but reports difficulty with ambulation as he feels his legs are weak. He reports a fall approx 2 weeks ago which occurred in his apartment which was preceded by dizziness which was described as a feeling of unsteadiness, in which he lost consciousness for an unknown period of time and awoke on the floor. denies any preceding palpitations, SOB, or chest pain. Of note, the patient has not been evaluated by a physician in approx 30 years. He attributes his dizziness and syncope to poor PO intake, often citing during the interview that he has "lost the will to live," and would "be better off dead." He denies any concrete plan to commit suicide, and denies any access to firearms in the home. He reports occasional diarrhea, approx 1x/week, but also has normal formed stools, currently denies any diarrhea. Denies any abdominal pain, cramping, nausea or vomiting. Denies any recent travel or sick contacts.     The date the pt first felt unwell: 3/30/2020  Fever or chills: yes [x  ] chills   no [  ]   - Tmax:   Fatigue, malaise or generalized weakness: yes [ x ]   no [  ]  Shortness of breath/dyspnea: yes [  ]   no [ x ]  Cough: yes [  ]   no [ x ], sputum production: yes [  ]   no [  ]  Blood in sputum: yes [  ]   no [ x ]  Anorexia/po intolerance: yes [x  ]   no [  ]  Chest pain or chest tightness: yes [  ]   no [ x ]  Edema in legs: yes [  ]   no [x  ]  Myalgias: yes [  ]   no [x  ]  Headache: yes [  ]   no [x  ]  Sore throat: yes [  ]   no [ x ]  Rhinorrhea: yes [  ]   no [x  ]  Abd pain: yes [  ]   no [x  ]  Nausea: yes [  ]   no [x  ]  Vomiting: yes [  ]   no [x  ]  Diarrhea: yes [x  ] occasional   no [  ]  Skin rashes: yes [  ]   no [ x ]  Loss of sense of smell/anosmia: yes [  ]   no [ x ]  Conjunctivitis: yes [  ]   no [ x ]  Recent travel: yes [  ]   no [ x ] - Location:   Any sick contacts: yes [  ]   no [ x ]  Close contact with someone confirmed positive with COVID-19 / SARS-CoV2 in the last 14 days: yes [  ]   no [x  ]  Code status: Full    ED Vitals:   T 98.9  BP 92/62 RR 18 SpO2 93% on RA  Labs: CBC wnl, No electrolyte abnormalities, TBili 2.2, troponin negative x 1, blood EtOH, tylenol, ASA level wnl, COVID19 PCr negative.  CT Chest/AP: No traumatic injury. Extensive nonobstructing left renal stones, including a partial staghorn calculus.  CT Head: No acute intracranial hemorrhage  CT C spine: Degenerative changes, no acute fracture  EKG: Sinus tachycardia at 110  In the ED Patient received 2L NS bolus

## 2020-05-08 NOTE — PROGRESS NOTE ADULT - SUBJECTIVE AND OBJECTIVE BOX
Patient is a 64y old  Male who presents with a chief complaint of Weakness (08 May 2020 00:50)   SOB    INTERVAL HPI/OVERNIGHT EVENTS:    MEDICATIONS  (STANDING):  enoxaparin Injectable 40 milliGRAM(s) SubCutaneous daily    MEDICATIONS  (PRN):      Allergies    No Known Allergies    Intolerances        REVIEW OF SYSTEMS:  CONSTITUTIONAL: No fever, weight loss, or fatigue  EYES: No eye pain, visual disturbances, or discharge  ENMT:  No difficulty hearing, tinnitus, vertigo; No sinus or throat pain  NECK: No pain or stiffness  BREASTS: No pain, masses, or nipple discharge  RESPIRATORY: No cough, wheezing, chills or hemoptysis; No shortness of breath  CARDIOVASCULAR: No chest pain, palpitations, dizziness, or leg swelling  GASTROINTESTINAL: No abdominal or epigastric pain. No nausea, vomiting, or hematemesis; No diarrhea or constipation. No melena or hematochezia.  GENITOURINARY: No dysuria, frequency, hematuria, or incontinence  NEUROLOGICAL: No headaches, memory loss, loss of strength, numbness, or tremors  SKIN: No itching, burning, rashes, or lesions   LYMPH NODES: No enlarged glands  ENDOCRINE: No heat or cold intolerance; No hair loss; No polydipsia or polyuria  MUSCULOSKELETAL: No joint pain or swelling; No muscle, back, or extremity pain  PSYCHIATRIC: No depression, anxiety, mood swings, or difficulty sleeping  HEME/LYMPH: No easy bruising, or bleeding gums  ALLERGY AND IMMUNOLOGIC: No hives or eczema    Vital Signs Last 24 Hrs  T(C): 36.6 (08 May 2020 05:07), Max: 37.2 (07 May 2020 23:33)  T(F): 97.9 (08 May 2020 05:07), Max: 98.9 (07 May 2020 23:33)  HR: 94 (08 May 2020 07:41) (94 - 119)  BP: 96/68 (08 May 2020 05:07) (92/62 - 107/76)  BP(mean): --  RR: 18 (08 May 2020 05:07) (18 - 18)  SpO2: 99% (08 May 2020 07:41) (93% - 99%)    PHYSICAL EXAM:  GENERAL: NAD, well-groomed, well-developed  HEAD:  Atraumatic, Normocephalic  EYES: EOMI, PERRLA, conjunctiva and sclera clear  ENMT: No tonsillar erythema, exudates, or enlargement; Moist mucous membranes, Good dentition, No lesions  NECK: Supple, No JVD, Normal thyroid  NERVOUS SYSTEM:  Alert & Oriented X3, Good concentration; Motor Strength 5/5 B/L upper and lower extremities; DTRs 2+ intact and symmetric  CHEST/LUNG: Clear to auscultation bilaterally; No rales, rhonchi, wheezing, or rubs  HEART: Regular rate and rhythm; No murmurs, rubs, or gallops  ABDOMEN: Soft, Nontender, Nondistended; Bowel sounds present  EXTREMITIES:  2+ Peripheral Pulses, No clubbing, cyanosis, or edema  LYMPH: No lymphadenopathy noted  SKIN: No rashes or lesions    LABS:                        14.4   10.48 )-----------( 140      ( 07 May 2020 20:12 )             41.7     07 May 2020 20:12    143    |  111    |  24     ----------------------------<  85     3.8     |  21     |  1.20     Ca    7.6        07 May 2020 20:12  Mg     2.2       07 May 2020 20:12    TPro  6.0    /  Alb  2.8    /  TBili  2.2    /  DBili  x      /  AST  20     /  ALT  22     /  AlkPhos  39     07 May 2020 20:12      CAPILLARY BLOOD GLUCOSE        BLOOD CULTURE    RADIOLOGY & ADDITIONAL TESTS:    Imaging Personally Reviewed:  [ ] YES     Consultant(s) Notes Reviewed:      Care Discussed with Consultants/Other Providers: Patient is a 64y old  Male who presents with a chief complaint of Weakness (08 May 2020 00:50)   SOB    INTERVAL HPI/OVERNIGHT EVENTS: states did not sleep at all last night due to racing thoughts     MEDICATIONS  (STANDING):  enoxaparin Injectable 40 milliGRAM(s) SubCutaneous daily    MEDICATIONS  (PRN):      Allergies    No Known Allergies    Intolerances        REVIEW OF SYSTEMS:  CONSTITUTIONAL: No fever, weight loss, or fatigue  EYES: No eye pain, visual disturbances, or discharge  ENMT:  No difficulty hearing, tinnitus, vertigo; No sinus or throat pain  NECK: No pain or stiffness  BREASTS: No pain, masses, or nipple discharge  RESPIRATORY: No cough, wheezing, chills or hemoptysis; No shortness of breath  CARDIOVASCULAR: No chest pain, palpitations, dizziness, or leg swelling  GASTROINTESTINAL: No abdominal or epigastric pain. No nausea, vomiting, or hematemesis; No diarrhea or constipation. No melena or hematochezia. Last BM 2-3 days ago.   GENITOURINARY: No dysuria, frequency, hematuria, or incontinence  NEUROLOGICAL: No headaches, memory loss, loss of strength, numbness, or tremors  SKIN: No itching, burning, rashes, or lesions   LYMPH NODES: No enlarged glands  ENDOCRINE: No heat or cold intolerance; No hair loss; No polydipsia or polyuria  MUSCULOSKELETAL: No joint pain or swelling; No muscle, back, or extremity pain  PSYCHIATRIC: No depression, anxiety, mood swings, or difficulty sleeping  HEME/LYMPH: No easy bruising, or bleeding gums  ALLERGY AND IMMUNOLOGIC: No hives or eczema    Vital Signs Last 24 Hrs  T(C): 36.6 (08 May 2020 05:07), Max: 37.2 (07 May 2020 23:33)  T(F): 97.9 (08 May 2020 05:07), Max: 98.9 (07 May 2020 23:33)  HR: 94 (08 May 2020 07:41) (94 - 119)  BP: 96/68 (08 May 2020 05:07) (92/62 - 107/76)  BP(mean): --  RR: 18 (08 May 2020 05:07) (18 - 18)  SpO2: 99% (08 May 2020 07:41) (93% - 99%)    PHYSICAL EXAM:  GENERAL: NAD, well-groomed, well-developed  HEAD:  Atraumatic, Normocephalic  EYES: EOMI, PERRLA, conjunctiva and sclera clear  ENMT: No tonsillar erythema, exudates, or enlargement; Moist mucous membranes, Good dentition, No lesions  NECK: Supple, No JVD, Normal thyroid  NERVOUS SYSTEM:  Alert & Oriented X3, Good concentration; Motor Strength 5/5 B/L upper and lower extremities; DTRs 2+ intact and symmetric  CHEST/LUNG: Clear to auscultation bilaterally; No rales, rhonchi, wheezing, or rubs  HEART: Regular rate and rhythm; No murmurs, rubs, or gallops  ABDOMEN: Soft, Nontender, Nondistended; Bowel sounds present  EXTREMITIES:  2+ Peripheral Pulses, No clubbing, cyanosis, or edema  LYMPH: No lymphadenopathy noted  SKIN: No rashes or lesions    LABS:                        14.4   10.48 )-----------( 140      ( 07 May 2020 20:12 )             41.7     07 May 2020 20:12    143    |  111    |  24     ----------------------------<  85     3.8     |  21     |  1.20     Ca    7.6        07 May 2020 20:12  Mg     2.2       07 May 2020 20:12    TPro  6.0    /  Alb  2.8    /  TBili  2.2    /  DBili  x      /  AST  20     /  ALT  22     /  AlkPhos  39     07 May 2020 20:12      CAPILLARY BLOOD GLUCOSE        BLOOD CULTURE    RADIOLOGY & ADDITIONAL TESTS:    Imaging Personally Reviewed:  [ ] YES     Consultant(s) Notes Reviewed:      Care Discussed with Consultants/Other Providers: Patient is a 64y old  Male who presents with a chief complaint of Weakness (08 May 2020 00:50)   SOB    INTERVAL HPI/OVERNIGHT EVENTS: states did not sleep at all last night due to racing thoughts . Denies chest pain, palpitation, sob     MEDICATIONS  (STANDING):  enoxaparin Injectable 40 milliGRAM(s) SubCutaneous daily    MEDICATIONS  (PRN):      Allergies    No Known Allergies    Intolerances        REVIEW OF SYSTEMS:  CONSTITUTIONAL: No fever, weight loss, or fatigue  EYES: No eye pain, visual disturbances, or discharge  ENMT:  No difficulty hearing, tinnitus, vertigo; No sinus or throat pain  NECK: No pain or stiffness  BREASTS: No pain, masses, or nipple discharge  RESPIRATORY: No cough, wheezing, chills or hemoptysis; No shortness of breath  CARDIOVASCULAR: No chest pain, palpitations, dizziness, or leg swelling  GASTROINTESTINAL: No abdominal or epigastric pain. No nausea, vomiting, or hematemesis; No diarrhea or constipation. No melena or hematochezia. Last BM 2-3 days ago.   GENITOURINARY: No dysuria, frequency, hematuria, or incontinence  NEUROLOGICAL: No headaches, memory loss, loss of strength, numbness, or tremors  SKIN: No itching, burning, rashes, or lesions   LYMPH NODES: No enlarged glands  ENDOCRINE: No heat or cold intolerance; No hair loss; No polydipsia or polyuria  MUSCULOSKELETAL: No joint pain or swelling; No muscle, back, or extremity pain  PSYCHIATRIC: No depression, anxiety, mood swings, or difficulty sleeping  HEME/LYMPH: No easy bruising, or bleeding gums  ALLERGY AND IMMUNOLOGIC: No hives or eczema    Vital Signs Last 24 Hrs  T(C): 36.6 (08 May 2020 05:07), Max: 37.2 (07 May 2020 23:33)  T(F): 97.9 (08 May 2020 05:07), Max: 98.9 (07 May 2020 23:33)  HR: 94 (08 May 2020 07:41) (94 - 119)  BP: 96/68 (08 May 2020 05:07) (92/62 - 107/76)  BP(mean): --  RR: 18 (08 May 2020 05:07) (18 - 18)  SpO2: 99% (08 May 2020 07:41) (93% - 99%)    PHYSICAL EXAM:  GENERAL: NAD, well-groomed, well-developed  HEAD:  Atraumatic, Normocephalic  EYES: EOMI, PERRLA, conjunctiva and sclera clear  ENMT: No tonsillar erythema, exudates, or enlargement; Moist mucous membranes, Good dentition, No lesions  NECK: Supple, No JVD, Normal thyroid  NERVOUS SYSTEM:  Alert & Oriented X3, Good concentration; Motor Strength 5/5 B/L upper and lower extremities; DTRs 2+ intact and symmetric  CHEST/LUNG: Clear to auscultation bilaterally; No rales, rhonchi, wheezing, or rubs  HEART: Regular rate and rhythm; No murmurs, rubs, or gallops  ABDOMEN: Soft, Nontender, Nondistended; Bowel sounds present  EXTREMITIES:  2+ Peripheral Pulses, No clubbing, cyanosis, or edema  LYMPH: No lymphadenopathy noted  SKIN: No rashes or lesions    LABS:                        14.4   10.48 )-----------( 140      ( 07 May 2020 20:12 )             41.7     07 May 2020 20:12    143    |  111    |  24     ----------------------------<  85     3.8     |  21     |  1.20     Ca    7.6        07 May 2020 20:12  Mg     2.2       07 May 2020 20:12    TPro  6.0    /  Alb  2.8    /  TBili  2.2    /  DBili  x      /  AST  20     /  ALT  22     /  AlkPhos  39     07 May 2020 20:12      CAPILLARY BLOOD GLUCOSE        BLOOD CULTURE    RADIOLOGY & ADDITIONAL TESTS:    Imaging Personally Reviewed:  [ ] YES     Consultant(s) Notes Reviewed:      Care Discussed with Consultants/Other Providers:

## 2020-05-08 NOTE — H&P ADULT - NSHPSOCIALHISTORY_GEN_ALL_CORE
Tobacco: never smoker  EtOH: denies  Recreational drug use: denies  Lives: alone, in apartment, never   Occupation: [employer] (retired 2012)  Ambulates/ADLs: independently, without assistive devices at baseline  Vaccinations: never received seasonal flu, PNA, shingles vaccines  No recent travel

## 2020-05-08 NOTE — H&P ADULT - PROBLEM SELECTOR PLAN 5
Patient reports suicidal ideation on admission. Reports for the past few days - 1 week, he felt that he would be better off dead  -Denies having had similar thoughts in the past  -No intention to hurt anyone else  -Denies having a concrete plan in place, nor any access to weapons or firearms in the home  -Currently on constant observation - continue  -Saul (Dr terry) consulted - f/u recs

## 2020-05-08 NOTE — H&P ADULT - PROBLEM SELECTOR PLAN 3
Patient with worsening weakness x 1 month, likely multifactorial etiology - psych, poor PO intake, deconditioning from lack of activity  -Encourage PO intake  -At baseline patient able to ambulate without assistive devices, states he is "active"  -Nutrition consult, PT consult, f/u recs  -check serum B12, folate, TSH

## 2020-05-08 NOTE — PROGRESS NOTE ADULT - PROBLEM SELECTOR PROBLEM 2
R/O 2019 novel coronavirus disease (COVID-19) Deep vein thrombosis (DVT) of popliteal vein of right lower extremity, unspecified chronicity

## 2020-05-08 NOTE — CONSULT NOTE ADULT - SUBJECTIVE AND OBJECTIVE BOX
[facility] Consultation    CHIEF COMPLAINT: Patient is a 64y old  Male who presents with a chief complaint of Weakness (08 May 2020 09:25)      HPI:  Patient is a 64/M without any significant known PMHx who presented to the ED c/o worsening weakness over the past 1 month with associated chills that he reports began on 3/30/20. Since that time he reports progressive weakness, fatigue, laying in bed for the majority of the day, difficulty with ambulation, and poor PO intake as he reports he no longer felt he had the desire to eat. He denies any associated SOB, cough, PARDO, but reports difficulty with ambulation as he feels his legs are weak. He reports a fall approx 2 weeks ago which occurred in his apartment which was preceded by dizziness which was described as a feeling of unsteadiness, in which he lost consciousness for an unknown period of time and awoke on the floor. denies any preceding palpitations, SOB, or chest pain. Of note, the patient has not been evaluated by a physician in approx 30 years. He attributes his dizziness and syncope to poor PO intake, often citing during the interview that he has "lost the will to live," and would "be better off dead." He denies any concrete plan to commit suicide, and denies any access to firearms in the home. He reports occasional diarrhea, approx 1x/week, but also has normal formed stools, currently denies any diarrhea. Denies any abdominal pain, cramping, nausea or vomiting. Denies any recent travel or sick contacts.   No prior cardiac history    The date the pt first felt unwell: 3/30/2020  Fever or chills: yes [x  ] chills   no [  ]   - Tmax:   Fatigue, malaise or generalized weakness: yes [ x ]   no [  ]  Shortness of breath/dyspnea: yes [  ]   no [ x ]  Cough: yes [  ]   no [ x ], sputum production: yes [  ]   no [  ]  Blood in sputum: yes [  ]   no [ x ]  Anorexia/po intolerance: yes [x  ]   no [  ]  Chest pain or chest tightness: yes [  ]   no [ x ]  Edema in legs: yes [  ]   no [x  ]  Myalgias: yes [  ]   no [x  ]  Headache: yes [  ]   no [x  ]  Sore throat: yes [  ]   no [ x ]  Rhinorrhea: yes [  ]   no [x  ]  Abd pain: yes [  ]   no [x  ]  Nausea: yes [  ]   no [x  ]  Vomiting: yes [  ]   no [x  ]  Diarrhea: yes [x  ] occasional   no [  ]  Skin rashes: yes [  ]   no [ x ]  Loss of sense of smell/anosmia: yes [  ]   no [ x ]  Conjunctivitis: yes [  ]   no [ x ]  Recent travel: yes [  ]   no [ x ] - Location:   Any sick contacts: yes [  ]   no [ x ]  Close contact with someone confirmed positive with COVID-19 / SARS-CoV2 in the last 14 days: yes [  ]   no [x  ]  Code status: Full    ED Vitals:   T 98.9  BP 92/62 RR 18 SpO2 93% on RA  Labs: CBC wnl, No electrolyte abnormalities, TBili 2.2, troponin negative x 1, blood EtOH, tylenol, ASA level wnl, COVID19 PCr negative.  CT Chest/AP: No traumatic injury. Extensive nonobstructing left renal stones, including a partial staghorn calculus.  CT Head: No acute intracranial hemorrhage  CT C spine: Degenerative changes, no acute fracture  EKG: Sinus tachycardia at 110  In the ED Patient received 2L NS bolus (08 May 2020 00:50)      PAST MEDICAL & SURGICAL HISTORY:  No pertinent past medical history  History of vocal cord polypectomy  Benign parotid tumor: 1983  History of appendectomy: 1965      SOCIAL HISTORY: no tob/etoh    FAMILY HISTORY: FAMILY HISTORY:  Family history of CVA (Father)  Family history of myocardial infarction (Father)  Family history of type 2 diabetes mellitus (Father)      MEDICATIONS  (STANDING):  enoxaparin Injectable 70 milliGRAM(s) SubCutaneous every 12 hours        Allergies    No Known Allergies    REVIEW OF SYSTEMS:    CONSTITUTIONAL: pos weakness and chills  EYES: No visual changes, No diplopia  ENMT: No throat pain , No exudate  NECK: No pain or stiffness  RESPIRATORY: No cough, wheezing, hemoptysis; pos shortness of breath  CARDIOVASCULAR: No chest pain or palpitations  GASTROINTESTINAL: No abdominal pain. No nausea, vomiting, or hematemesis; No diarrhea or constipation. No melena or hematochezia.  GENITOURINARY: No dysuria, frequency or hematuria  NEUROLOGICAL: No numbness or weakness  SKIN: No itching or rash  All other review of systems is negative unless indicated above    VITAL SIGNS:   Vital Signs Last 24 Hrs  T(C): 36.6 (08 May 2020 05:07), Max: 37.2 (07 May 2020 23:33)  T(F): 97.9 (08 May 2020 05:07), Max: 98.9 (07 May 2020 23:33)  HR: 94 (08 May 2020 07:41) (94 - 119)  BP: 96/68 (08 May 2020 05:07) (92/62 - 107/76)  BP(mean): --  RR: 18 (08 May 2020 05:07) (18 - 18)  SpO2: 99% (08 May 2020 07:41) (93% - 99%)    I&O's Summary    07 May 2020 07:01  -  08 May 2020 07:00  --------------------------------------------------------  IN: 0 mL / OUT: 500 mL / NET: -500 mL        PHYSICAL EXAM:    Constitutional: NAD, awake and alert  Eyes:   Pupils round, no lesions  ENMT: no exudate or erythema  Pulmonary:  breath sounds are clear bilaterally, No wheezing, rales or rhonchi  Cardiovascular: PMI not palpable  Regular S1 and S2, no murmurs, rubs, gallops or clicks  Gastrointestinal: Bowel Sounds present, soft, nontender.   Lymph: No peripheral edema. No cervical lymphadenopathy.  Neurological: Alert, no focal deficits  Skin: No rashes. Changes of chronic venous stasis. No cyanosis.  Psych: depressed    LABS: All Labs Reviewed:                        14.4   10.48 )-----------( 140      ( 07 May 2020 20:12 )             41.7     07 May 2020 20:12    143    |  111    |  24     ----------------------------<  85     3.8     |  21     |  1.20     Ca    7.6        07 May 2020 20:12  Mg     2.2       07 May 2020 20:12    TPro  6.0    /  Alb  2.8    /  TBili  2.2    /  DBili  x      /  AST  20     /  ALT  22     /  AlkPhos  39     07 May 2020 20:12      CARDIAC MARKERS ( 07 May 2020 20:12 )  .023 ng/mL / x     / x     / x     / 1.6 ng/mL      ECG: SR, non spec ST abn      < from: CT Chest No Cont (05.07.20 @ 20:34) >    EXAM:  CT CHEST                          EXAM:  CT ABDOMEN AND PELVIS                            PROCEDURE DATE:  05/07/2020          INTERPRETATION:  CLINICAL INFORMATION: Status post fall    COMPARISON: None.    PROCEDURE:   CT of the Chest, Abdomen and Pelvis was performed without intravenous contrast.   Intravenous contrast: None.  Oral contrast: None.  Sagittal and coronal reformats were performed.    FINDINGS:    CHEST:     LUNGS AND LARGE AIRWAYS: Patent central airways. No pulmonary nodules. Mild bilateral lower lobe subsegmental atelectasis.  PLEURA: No pleural effusion.  VESSELS: Normal caliber aorta. Mild coronary artery calcification.  HEART: Heart size is normal. No pericardial effusion.  MEDIASTINUM AND SELVIN: No lymphadenopathy.  CHEST WALL AND LOWER NECK: Within normal limits.    ABDOMEN AND PELVIS:    LIVER: Within normal limits.  BILE DUCTS: Normal caliber.  GALLBLADDER: Multiple gallstones.  SPLEEN: Within normal limits.  PANCREAS: Within normal limits.  ADRENALS: Within normal limits.  KIDNEYS/URETERS: 1.9 cm nonobstructing left upper pole renal stone. Partial staghorn left renal calculus with multiple small stones versus fragmented larger stone in the left renal pelvis. No hydronephrosis. 2.7 cm hypodense lesion in the posterior aspect of the left kidney which is unable to be characterized on this unenhanced CT scan.    BLADDER: Within normal limits.  REPRODUCTIVE ORGANS: Prostate within normal limits.    BOWEL: No bowel obstruction. Appendix not identified. Mild colonic diverticulosis.  PERITONEUM: No ascites or hemoperitoneum.  VESSELS: Mild atherosclerotic arterial calcifications.  RETROPERITONEUM/LYMPH NODES: No lymphadenopathy.    ABDOMINAL WALL: Within normal limits.  BONES: No fractures.    IMPRESSION:     No traumatic injury.  Extensive nonobstructing left renal stones, including a partial staghorn calculus.                    WILLIAMS GARDNER M.D.,ATTENDING RADIOLOGIST  This document has been electronically signed. May  7 2020  9:05PM                < end of copied text >

## 2020-05-08 NOTE — BEHAVIORAL HEALTH ASSESSMENT NOTE - HPI (INCLUDE ILLNESS QUALITY, SEVERITY, DURATION, TIMING, CONTEXT, MODIFYING FACTORS, ASSOCIATED SIGNS AND SYMPTOMS)
Patient seen, evaluated and chart reviewed. Patient is a 65 y/o SWM, domiciled, retired, no significant prior psychiatric history, without any significant known PMHx who presented to the ED c/o worsening weakness over the past 1 month with associated chills that he reports began on 3/30/20. Since that time he reports progressive weakness, fatigue, laying in bed for the majority of the day, difficulty with ambulation, and poor PO intake as he reports he no longer felt he had the desire to eat. He denies any associated SOB, cough, PARDO, but reports difficulty with ambulation as he feels his legs are weak. He reports a fall approx 2 weeks ago which occurred in his apartment which was preceded by dizziness which was described as a feeling of unsteadiness, in which he lost consciousness for an unknown period of time and awoke on the floor. denies any preceding palpitations, SOB, or chest pain. Of note, the patient has not been evaluated by a physician in approx 30 years. He attributes his dizziness and syncope to poor PO intake, often citing during the interview that he has "lost the will to live," and would "be better off dead." He denies any concrete plan to commit suicide, and denies any access to firearms in the home. He reports occasional diarrhea, approx 1x/week, but also has normal formed stools, currently denies any diarrhea. Denies any abdominal pain, cramping, nausea or vomiting. Denies any recent travel or sick contacts.  Patient expressed frustration with his medical situation, as he is unsure about his diagnosis or the future prospects. At some point he expressed suicidal ideation, but it appears it was mostly passive in nature. At this time he reports worsened mood, poor sleep, worsened appetite, poor energy and interest. Denies symptoms of psychosis or caryn.

## 2020-05-08 NOTE — BEHAVIORAL HEALTH ASSESSMENT NOTE - SUMMARY
65 y/o SWM, domiciled, retired, no significant prior psychiatric history, without any significant known PMHx who presented to the ED c/o worsening weakness over the past 1 month with associated chills that he reports began on 3/30/20.   Patient expressed frustration with his medical situation, as he is unsure about his diagnosis or the future prospects. At some point he expressed suicidal ideation, but it appears it was mostly passive in nature. At this time he reports worsened mood, poor sleep, worsened appetite, poor energy and interest. Denies symptoms of psychosis or caryn.    IMP: Adjustment disorder with depression    REC: Paxil 20 mg po daily, Remeron 15 mg po at HS

## 2020-05-08 NOTE — PROGRESS NOTE ADULT - PROBLEM SELECTOR PLAN 4
Patient without any known hx of psychiatric pathology, now with what appears to be severe depressive episode  with admitted suicidal ideation, poor PO intake, unable to perform daily activities, and difficulty sleeping  -PHQ9 score 27 - indicative of severe depression, patient may require pharmacologic intervention  -Has anxiety regarding multiple life stressors - living situation, money, recent loss of sister  -Psych (Dr Conley) consulted - f/u recs Psych consult appreciated , started on Remeron and Paxil

## 2020-05-08 NOTE — BEHAVIORAL HEALTH ASSESSMENT NOTE - NSBHCHARTREVIEWVS_PSY_A_CORE FT
Vital Signs Last 24 Hrs  T(C): 36.5 (08 May 2020 12:49), Max: 37.2 (07 May 2020 23:33)  T(F): 97.7 (08 May 2020 12:49), Max: 98.9 (07 May 2020 23:33)  HR: 103 (08 May 2020 12:49) (94 - 119)  BP: 96/60 (08 May 2020 12:49) (92/62 - 107/76)  BP(mean): --  RR: 18 (08 May 2020 12:49) (18 - 18)  SpO2: 94% (08 May 2020 12:49) (93% - 99%)

## 2020-05-08 NOTE — CONSULT NOTE ADULT - PROBLEM SELECTOR RECOMMENDATION 9
-----------  in progress      nc ct ap w +gs, liver/bile ducts normal appearing  ?in setting of dehydration/dec po vs gilberts vs other  fractionate labs, check coags  diet as tolerated  to follow nc ct ap w +gs, liver/bile ducts normal appearing  ?in setting of dehydration/dec po vs gilberts vs other  fractionate and repeat lfts in am, check coags  diet as tolerated  to follow

## 2020-05-08 NOTE — H&P ADULT - PROBLEM SELECTOR PLAN 7
Patient found to have plantar wart on the plantar surface of the right 3rd toe  -Likely chronic. No other obvious verrucous lesions appreciated  -Podiatry consulted, f/u recs

## 2020-05-08 NOTE — H&P ADULT - PROBLEM SELECTOR PLAN 1
Patient presenting with worsening weakness, fatigue, ove rhte past 1 month with 2 admitted falls in his home approx 2 weeks prior to admission with associated LOC and preceding dizziness. No associated chest pain, palpitations, SOB, vision changes  -Patient attributes to Poor PO intake. Has not occurred in ~ 2 weeks  -S/p 2L NS bolus in ED, encourage PO hydration  -CT head negative for ICH or gross structural abnormality, CT cspine without acute fracture  -Labs wnl, unlikely metabolic component.   -Patient without any outpatient followup ~ 30 years, likely with some chronic unaddressed issues  -Check carotid dopplers, LE dopplers, ECHO  -Check B12, folate, TSH, Lipid profile, HbA1c  -Cardio ([facility]) consulted, f/u recs Patient presenting with worsening weakness, fatigue, over the past 1 month with 2 admitted falls in his home approx 2 weeks prior to admission with associated LOC and preceding dizziness. No associated chest pain, palpitations, SOB, vision changes  -Patient attributes to Poor PO intake. Has not occurred in ~ 2 weeks  -S/p 2L NS bolus in ED, encourage PO hydration  -CT head negative for ICH or gross structural abnormality, CT cspine without acute fracture  -Labs wnl, unlikely metabolic component.   -Patient without any outpatient followup ~ 30 years, likely with some chronic unaddressed issues  -Check carotid dopplers, LE dopplers, ECHO  -Check B12, folate, TSH, Lipid profile, HbA1c  -Cardio ([facility]) consulted, f/u recs

## 2020-05-08 NOTE — ED ADULT NURSE REASSESSMENT NOTE - NS ED NURSE REASSESS COMMENT FT1
Pt attempted to urinate several times during the night. Pt states he hasn't urinated since 10 a.m. Bladder scan showed >300. Pt straight cathed using sterile technique.  2 RNS present.  Pt tolerated procedure well. Sterile specimen collected. UA, Culture, Utox sent to lab. MD Kennedy made aware of situation. Pt attempted to urinate several times during the night. Pt states he hasn't urinated since 10 a.m. Bladder scan showed >300. Pt straight cathed using sterile technique.  2 RNS present.  Pt tolerated procedure well. Sterile specimen collected. UA, Culture, Utox sent to lab. Urine is tea colored and cloudy. Skin is intact red blanchable on sacrum. MD Kennedy made aware of situation.

## 2020-05-08 NOTE — ED ADULT NURSE REASSESSMENT NOTE - NS ED NURSE REASSESS COMMENT FT1
Pt given a meal tray and offered PO fluids. Pt remains on 1:1, Covid swab is negative. Pt will be admitted, and updated on plan of care.

## 2020-05-08 NOTE — PROGRESS NOTE ADULT - PROBLEM SELECTOR PLAN 2
Patient presenting with chills, weakness, occasional diarrhea since 3/30/20, after visiting a pharmacy, but has had extremely limited outside contact since that time as he has not left home  -COVID19 PCR negative and COVID ruled out Started on Lovenox  Hematology consulted

## 2020-05-09 LAB
ALBUMIN SERPL ELPH-MCNC: 2.7 G/DL — LOW (ref 3.3–5)
ALP SERPL-CCNC: 41 U/L — SIGNIFICANT CHANGE UP (ref 40–120)
ALT FLD-CCNC: 24 U/L — SIGNIFICANT CHANGE UP (ref 12–78)
APTT BLD: 30.6 SEC — SIGNIFICANT CHANGE UP (ref 28.5–37)
AST SERPL-CCNC: 19 U/L — SIGNIFICANT CHANGE UP (ref 15–37)
BILIRUB DIRECT SERPL-MCNC: 0.5 MG/DL — HIGH (ref 0.05–0.2)
BILIRUB INDIRECT FLD-MCNC: 0.6 MG/DL — SIGNIFICANT CHANGE UP (ref 0.2–1)
BILIRUB SERPL-MCNC: 1.1 MG/DL — SIGNIFICANT CHANGE UP (ref 0.2–1.2)
CULTURE RESULTS: NO GROWTH — SIGNIFICANT CHANGE UP
HCV AB S/CO SERPL IA: 0.15 S/CO — SIGNIFICANT CHANGE UP (ref 0–0.99)
HCV AB SERPL-IMP: SIGNIFICANT CHANGE UP
INR BLD: 1.46 RATIO — HIGH (ref 0.88–1.16)
PROT SERPL-MCNC: 6.2 G/DL — SIGNIFICANT CHANGE UP (ref 6–8.3)
PROTHROM AB SERPL-ACNC: 16.6 SEC — HIGH (ref 10–12.9)
SPECIMEN SOURCE: SIGNIFICANT CHANGE UP

## 2020-05-09 PROCEDURE — 99232 SBSQ HOSP IP/OBS MODERATE 35: CPT

## 2020-05-09 PROCEDURE — 99233 SBSQ HOSP IP/OBS HIGH 50: CPT

## 2020-05-09 RX ORDER — RIVAROXABAN 15 MG-20MG
15 KIT ORAL
Refills: 0 | Status: DISCONTINUED | OUTPATIENT
Start: 2020-05-09 | End: 2020-05-15

## 2020-05-09 RX ADMIN — RIVAROXABAN 15 MILLIGRAM(S): KIT at 17:15

## 2020-05-09 RX ADMIN — ENOXAPARIN SODIUM 70 MILLIGRAM(S): 100 INJECTION SUBCUTANEOUS at 05:04

## 2020-05-09 RX ADMIN — Medication 20 MILLIGRAM(S): at 11:54

## 2020-05-09 RX ADMIN — MIRTAZAPINE 15 MILLIGRAM(S): 45 TABLET, ORALLY DISINTEGRATING ORAL at 21:07

## 2020-05-09 NOTE — PROGRESS NOTE ADULT - PROBLEM SELECTOR PLAN 1
Patient presenting with worsening weakness, fatigue, over the past 1 month with 2 admitted falls in his home approx 2 weeks prior to admission with associated LOC and preceding dizziness. No associated chest pain, palpitations, SOB, vision changes  -Patient attributes to Poor PO intake. Has not occurred in ~ 2 weeks  -S/p 2L NS bolus in ED, encourage PO hydration  -CT head negative for ICH or gross structural abnormality, CT cspine without acute fracture  -Labs wnl, unlikely metabolic component.   -Patient without any outpatient followup ~ 30 years, likely with some chronic unaddressed issues  -F/u  carotid dopplers,  ECHO  -F/u B12, folate, TSH, Lipid profile, HbA1c  -Cardio ([facility]) consulted, f/u recs  - Neuro, cardio consults Patient presenting with worsening weakness, fatigue, over the past 1 month with 2 admitted falls in his home approx 2 weeks prior to admission with associated LOC and preceding dizziness. No associated chest pain, palpitations, SOB, vision changes  -Patient attributes to Poor PO intake. Has not occurred in ~ 2 weeks  -S/p 2L NS bolus in ED, encourage PO hydration  -CT head negative for ICH or gross structural abnormality, CT cspine without acute fracture  -Labs wnl, unlikely metabolic component.   -Patient without any outpatient followup ~ 30 years, likely with some chronic unaddressed issues  - Carotod doppler negative  -TTE pending   - Neuro, cardio consults appreciated

## 2020-05-09 NOTE — PROGRESS NOTE ADULT - ASSESSMENT
64/M without any significant known PMHx who presented to the ED c/o worsening weakness over the past 1 month with associated chills, and syncope, admitted for r/o COVID19 and syncope workup. Also found to have depression and suicida      Syncope  -No evidence of arrythmia on tele or ECG  -denies dizziness or lightheadness  -S/P IVF in ED, encourage PO hydration  - Would check orthostatic BP  - Likely related to poor PO intake  -FU TTE  -Carotid noted    DVT  -Per priamery team  - Continue w/ rivaroxaban      -Monitor and replete lytes, keep K>4 and Mg >2  - Further cardiac workup will depend on clinical course.   - All other workup per primary team  Thank you for the consult  Will continue to follow  Aneudy Long DNP, ANP-c  Cardiology   Spectra #[id number] [phone] 64/M without any significant known PMHx who presented to the ED c/o worsening weakness over the past 1 month with associated chills, and syncope, admitted for r/o COVID19 and syncope workup. Also found to have depression and suicidal    Syncope  -No evidence of arrythmia on tele or ECG  -denies dizziness or lightheadness  -S/P IVF in ED, encourage PO hydration  - Would check orthostatic BP  - Likely related to poor PO intake  -FU TTE  -Carotid noted    DVT  -Per priamery team  - Continue w/ rivaroxaban      -Monitor and replete lytes, keep K>4 and Mg >2  - Further cardiac workup will depend on clinical course.   - All other workup per primary team  Thank you for the consult  Will continue to follow  Aneudy Long DNP, ANP-c  Cardiology   Spectra #[id number] [phone] 64/M without any significant known PMHx who presented to the ED c/o worsening weakness over the past 1 month with associated chills, and syncope, admitted for r/o COVID19 and syncope workup. Also found to have depression and suicidal    Syncope  -No evidence of arrythmia on tele or ECG  -denies dizziness or lightheadness  -S/P IVF in ED, encourage PO hydration  - Would check orthostatic BP  - Likely related to poor PO intake  -FU TTE  -Carotid noted    DVT  -Per primary team  - Continue w/ rivaroxaban      -Monitor and replete lytes, keep K>4 and Mg >2  - Further cardiac workup will depend on clinical course.   - All other workup per primary team  Thank you for the consult  Will continue to follow  Aneudy Long DNP, ANP-c  Cardiology   Spectra #[id number] [phone]

## 2020-05-09 NOTE — PROGRESS NOTE ADULT - ASSESSMENT
64/M without any significant known PMHx who presented to the ED c/o worsening weakness over the past 1 month with associated chills, and syncope, admitted for r/o COVID19 and syncope workup. Also found to have depression and suicidal ideation with significant social concerns. 64/M without any significant known PMHx who presented to the ED c/o worsening weakness over the past 1 month with associated chills, and syncope, admitted for r/o COVID19 and syncope workup. Also found to have depression and suicidal ideation with significant social concerns but now better and states that he was stupid to think that way

## 2020-05-09 NOTE — PROGRESS NOTE ADULT - SUBJECTIVE AND OBJECTIVE BOX
Patient is a 64y old  Male who presents with a chief complaint of Weakness (08 May 2020 00:50)   SOB    INTERVAL HPI/OVERNIGHT EVENTS: Pt seen and examined.    MEDICATIONS  (STANDING):  enoxaparin Injectable 70 milliGRAM(s) SubCutaneous every 12 hours  mirtazapine 15 milliGRAM(s) Oral at bedtime  PARoxetine 20 milliGRAM(s) Oral daily    MEDICATIONS  (PRN):        Allergies    No Known Allergies    Intolerances        REVIEW OF SYSTEMS:  CONSTITUTIONAL: No fever, weight loss, or fatigue  EYES: No eye pain, visual disturbances, or discharge  ENMT:  No difficulty hearing, tinnitus, vertigo; No sinus or throat pain  NECK: No pain or stiffness  BREASTS: No pain, masses, or nipple discharge  RESPIRATORY: No cough, wheezing, chills or hemoptysis; No shortness of breath  CARDIOVASCULAR: No chest pain, palpitations, dizziness, or leg swelling  GASTROINTESTINAL: No abdominal or epigastric pain. No nausea, vomiting, or hematemesis; No diarrhea or constipation. No melena or hematochezia. Last BM 2-3 days ago.   GENITOURINARY: No dysuria, frequency, hematuria, or incontinence  NEUROLOGICAL: No headaches, memory loss, loss of strength, numbness, or tremors  SKIN: No itching, burning, rashes, or lesions   LYMPH NODES: No enlarged glands  ENDOCRINE: No heat or cold intolerance; No hair loss; No polydipsia or polyuria  MUSCULOSKELETAL: No joint pain or swelling; No muscle, back, or extremity pain  PSYCHIATRIC: No depression, anxiety, mood swings, or difficulty sleeping  HEME/LYMPH: No easy bruising, or bleeding gums  ALLERGY AND IMMUNOLOGIC: No hives or eczema    Vital Signs Last 24 Hrs  T(C): 36.6 (09 May 2020 05:14), Max: 36.7 (08 May 2020 21:05)  T(F): 97.9 (09 May 2020 05:14), Max: 98 (08 May 2020 21:05)  HR: 95 (09 May 2020 07:46) (92 - 103)  BP: 100/67 (09 May 2020 05:14) (96/60 - 100/67)  BP(mean): --  RR: 18 (09 May 2020 05:14) (18 - 18)  SpO2: 95% (09 May 2020 05:14) (94% - 95%)    PHYSICAL EXAM:  GENERAL: NAD, well-groomed, well-developed  HEAD:  Atraumatic, Normocephalic  EYES: EOMI, PERRLA, conjunctiva and sclera clear  ENMT: No tonsillar erythema, exudates, or enlargement; Moist mucous membranes, Good dentition, No lesions  NECK: Supple, No JVD, Normal thyroid  NERVOUS SYSTEM:  Alert & Oriented X3, Good concentration; Motor Strength 5/5 B/L upper and lower extremities; DTRs 2+ intact and symmetric  CHEST/LUNG: Clear to auscultation bilaterally; No rales, rhonchi, wheezing, or rubs  HEART: Regular rate and rhythm; No murmurs, rubs, or gallops  ABDOMEN: Soft, Nontender, Nondistended; Bowel sounds present  EXTREMITIES:  2+ Peripheral Pulses, No clubbing, cyanosis, or edema  LYMPH: No lymphadenopathy noted  SKIN: No rashes or lesions    LABS: AM labs reviewed                           14.4   10.48 )-----------( 140      ( 07 May 2020 20:12 )             41.7     07 May 2020 20:12    143    |  111    |  24     ----------------------------<  85     3.8     |  21     |  1.20     Ca    7.6        07 May 2020 20:12  Mg     2.2       07 May 2020 20:12    TPro  6.0    /  Alb  2.8    /  TBili  2.2    /  DBili  x      /  AST  20     /  ALT  22     /  AlkPhos  39     07 May 2020 20:12      CAPILLARY BLOOD GLUCOSE        BLOOD CULTURE    RADIOLOGY & ADDITIONAL TESTS:    Imaging Personally Reviewed:  [ ] YES     Consultant(s) Notes Reviewed:      Care Discussed with Consultants/Other Providers: Patient is a 64y old  Male who presents with a chief complaint of Weakness (08 May 2020 00:50)   SOB    INTERVAL HPI/OVERNIGHT EVENTS: Pt seen and examined. Feels weak, still has poor appetite. Denies any suicidal ideas. Denies chest pain, palpitation, sob     MEDICATIONS  (STANDING):  enoxaparin Injectable 70 milliGRAM(s) SubCutaneous every 12 hours  mirtazapine 15 milliGRAM(s) Oral at bedtime  PARoxetine 20 milliGRAM(s) Oral daily    MEDICATIONS  (PRN):        Allergies    No Known Allergies    Intolerances        REVIEW OF SYSTEMS:  CONSTITUTIONAL: No fever,  or fatigue  EYES: No eye pain, visual disturbances, or discharge  ENMT:  No difficulty hearing, tinnitus, vertigo; No sinus or throat pain  NECK: No pain or stiffness  RESPIRATORY: No cough, wheezing, chills or hemoptysis; No shortness of breath  CARDIOVASCULAR: No chest pain, palpitations, dizziness, or leg swelling  GASTROINTESTINAL: No abdominal or epigastric pain. No nausea, vomiting, or hematemesis; No diarrhea or constipation. No melena or hematochezia. Last BM 2-3 days ago.   GENITOURINARY: No dysuria, frequency, hematuria, or incontinence  NEUROLOGICAL: No headaches, memory loss, loss of strength, numbness, or tremors  SKIN: No itching, burning, rashes, or lesions   LYMPH NODES: No enlarged glands  ENDOCRINE: No heat or cold intolerance; No hair loss; No polydipsia or polyuria  MUSCULOSKELETAL: No joint pain or swelling; No muscle, back, or extremity pain  PSYCHIATRIC: No depression, anxiety, mood swings, or difficulty sleeping  HEME/LYMPH: No easy bruising, or bleeding gums  ALLERGY AND IMMUNOLOGIC: No hives or eczema    Vital Signs Last 24 Hrs  T(C): 36.6 (09 May 2020 05:14), Max: 36.7 (08 May 2020 21:05)  T(F): 97.9 (09 May 2020 05:14), Max: 98 (08 May 2020 21:05)  HR: 95 (09 May 2020 07:46) (92 - 103)  BP: 100/67 (09 May 2020 05:14) (96/60 - 100/67)  BP(mean): --  RR: 18 (09 May 2020 05:14) (18 - 18)  SpO2: 95% (09 May 2020 05:14) (94% - 95%)    PHYSICAL EXAM:  GENERAL: NAD,  well-developed  HEAD:  Atraumatic, Normocephalic  EYES: EOMI, PERRLA, conjunctiva and sclera clear  ENMT: No tonsillar erythema, exudates, or enlargement; Moist mucous membranes, Good dentition, No lesions  NECK: Supple, No JVD, Normal thyroid  NERVOUS SYSTEM:  Alert & Oriented X3, Good concentration; Motor Strength 5/5 B/L upper and lower extremities; DTRs 2+ intact and symmetric  CHEST/LUNG: Clear to auscultation bilaterally; No rales, rhonchi, wheezing, or rubs  HEART: Regular rate and rhythm; No murmurs, rubs, or gallops  ABDOMEN: Soft, Nontender, Nondistended; Bowel sounds present  EXTREMITIES:  2+ Peripheral Pulses, No clubbing, cyanosis, or edema  LYMPH: No lymphadenopathy noted  SKIN: No rashes or lesions    LABS: AM labs reviewed                           14.4   10.48 )-----------( 140      ( 07 May 2020 20:12 )             41.7     07 May 2020 20:12    143    |  111    |  24     ----------------------------<  85     3.8     |  21     |  1.20     Ca    7.6        07 May 2020 20:12  Mg     2.2       07 May 2020 20:12    TPro  6.0    /  Alb  2.8    /  TBili  2.2    /  DBili  x      /  AST  20     /  ALT  22     /  AlkPhos  39     07 May 2020 20:12      CAPILLARY BLOOD GLUCOSE        BLOOD CULTURE    RADIOLOGY & ADDITIONAL TESTS:    Imaging Personally Reviewed:  [ ] YES     Consultant(s) Notes Reviewed:      Care Discussed with Consultants/Other Providers:

## 2020-05-09 NOTE — PROGRESS NOTE ADULT - PROBLEM SELECTOR PLAN 5
Patient reports suicidal ideation on admission. Reports for the past few days - 1 week, he felt that he would be better off dead  -Denies having had similar thoughts in the past  -No intention to hurt anyone else  -Denies having a concrete plan in place, nor any access to weapons or firearms in the home  -off constant obs now, pt stable, mood improved, continue with current med regimen  -Saul (Dr terry) consulted - f/u recs

## 2020-05-09 NOTE — PROGRESS NOTE ADULT - SUBJECTIVE AND OBJECTIVE BOX
[facility] -- Lizzy Gonsales, Rosibel, Tara, Ayo Blanco Savella  Office # [phone]      Follow Up:    near syncope  Subjective/Observations:   No events overnight resting comfortably in bed.  No complaints of chest pain, dyspnea, or palpitations reported. No signs of orthopnea or PND. Denies dizziness/lightheadness     REVIEW OF SYSTEMS: All other review of systems is negative unless indicated above    PAST MEDICAL & SURGICAL HISTORY:  No pertinent past medical history  History of vocal cord polypectomy  Benign parotid tumor: 1983  History of appendectomy: 1965      MEDICATIONS  (STANDING):  mirtazapine 15 milliGRAM(s) Oral at bedtime  PARoxetine 20 milliGRAM(s) Oral daily  rivaroxaban 15 milliGRAM(s) Oral two times a day with meals    MEDICATIONS  (PRN):      Allergies    No Known Allergies    Intolerances        Vital Signs Last 24 Hrs  T(C): 36.6 (09 May 2020 05:14), Max: 36.7 (08 May 2020 21:05)  T(F): 97.9 (09 May 2020 05:14), Max: 98 (08 May 2020 21:05)  HR: 95 (09 May 2020 07:46) (92 - 103)  BP: 100/67 (09 May 2020 05:14) (96/60 - 100/67)  BP(mean): --  RR: 18 (09 May 2020 05:14) (18 - 18)  SpO2: 95% (09 May 2020 05:14) (94% - 95%)    I&O's Summary        PHYSICAL EXAM:  TELE: SR  Constitutional: NAD, awake and alert, well-developed  HEENT: Moist Mucous Membranes, Anicteric  Pulmonary: Non-labored, breath sounds are clear bilaterally, No wheezing, crackles or rhonchi  Cardiovascular: Regular, S1 and S2 nl, No murmurs, rubs, gallops or clicks  Gastrointestinal: Bowel Sounds present, soft, nontender.   Lymph: No lymphadenopathy. No peripheral edema.  Skin: No visible rashes or ulcers.  Psych:  Mood & affect appropriate    LABS: All Labs Reviewed:                        14.4   10.48 )-----------( 140      ( 07 May 2020 20:12 )             41.7     07 May 2020 20:12    143    |  111    |  24     ----------------------------<  85     3.8     |  21     |  1.20     Ca    7.6        07 May 2020 20:12  Mg     2.2       07 May 2020 20:12    TPro  6.2    /  Alb  2.7    /  TBili  1.1    /  DBili  .50    /  AST  19     /  ALT  24     /  AlkPhos  41     09 May 2020 06:38  TPro  6.0    /  Alb  2.8    /  TBili  2.2    /  DBili  x      /  AST  20     /  ALT  22     /  AlkPhos  39     07 May 2020 20:12    PT/INR - ( 09 May 2020 06:38 )   PT: 16.6 sec;   INR: 1.46 ratio         PTT - ( 09 May 2020 06:38 )  PTT:30.6 sec  CARDIAC MARKERS ( 07 May 2020 20:12 )  .023 ng/mL / x     / x     / x     / 1.6 ng/mL    ECG: SR, non spec ST abn      < from: CT Chest No Cont (05.07.20 @ 20:34) >    EXAM:  CT CHEST                          EXAM:  CT ABDOMEN AND PELVIS                            PROCEDURE DATE:  05/07/2020          INTERPRETATION:  CLINICAL INFORMATION: Status post fall    COMPARISON: None.    PROCEDURE:   CT of the Chest, Abdomen and Pelvis was performed without intravenous contrast.   Intravenous contrast: None.  Oral contrast: None.  Sagittal and coronal reformats were performed.    FINDINGS:    CHEST:     LUNGS AND LARGE AIRWAYS: Patent central airways. No pulmonary nodules. Mild bilateral lower lobe subsegmental atelectasis.  PLEURA: No pleural effusion.  VESSELS: Normal caliber aorta. Mild coronary artery calcification.  HEART: Heart size is normal. No pericardial effusion.  MEDIASTINUM AND SELVIN: No lymphadenopathy.  CHEST WALL AND LOWER NECK: Within normal limits.    ABDOMEN AND PELVIS:    LIVER: Within normal limits.  BILE DUCTS: Normal caliber.  GALLBLADDER: Multiple gallstones.  SPLEEN: Within normal limits.  PANCREAS: Within normal limits.  ADRENALS: Within normal limits.  KIDNEYS/URETERS: 1.9 cm nonobstructing left upper pole renal stone. Partial staghorn left renal calculus with multiple small stones versus fragmented larger stone in the left renal pelvis. No hydronephrosis. 2.7 cm hypodense lesion in the posterior aspect of the left kidney which is unable to be characterized on this unenhanced CT scan.    BLADDER: Within normal limits.  REPRODUCTIVE ORGANS: Prostate within normal limits.    BOWEL: No bowel obstruction. Appendix not identified. Mild colonic diverticulosis.  PERITONEUM: No ascites or hemoperitoneum.  VESSELS: Mild atherosclerotic arterial calcifications.  RETROPERITONEUM/LYMPH NODES: No lymphadenopathy.    ABDOMINAL WALL: Within normal limits.  BONES: No fractures.    IMPRESSION:     No traumatic injury.  Extensive nonobstructing left renal stones, including a partial staghorn calculus.                    WILLIAMS GARDNER M.D.,ATTENDING RADIOLOGIST  This document has been electronically signed. May  7 2020  9:05PM

## 2020-05-09 NOTE — PROGRESS NOTE ADULT - PROBLEM SELECTOR PLAN 1
nc ct ap w +gs, liver/bile ducts normal appearing  now resolved, suspect in setting of prior dec po  diet as tolerated

## 2020-05-09 NOTE — PROGRESS NOTE ADULT - ASSESSMENT
Right leg DVT probably provoked related to immobility.    No evidence of malignancy on scans  family history notable for sister that may have past away from PE after a extremity stent procedure?  Depression with ?suicidal ideation    Recommendations:  1.  continue lovenox and would bridge to DOAC when stable.  However concern with compliance and followup and risks of toxicity but have discussed with Dr. Pritchett and feel ok to treat with DOAC.  no indication for IVC filter  2.  because of family history would consider hypercoaguability workup as an outpatient  3.  with first event and current history would treat for 3 months with repeat evaluation with doppler and johnathon  4.  further heme recommendations pending above

## 2020-05-09 NOTE — PROGRESS NOTE ADULT - PROBLEM SELECTOR PLAN 2
Started on Lovenox - transition to DOAC today  Hematology consulted, ashwin noted Started on Lovenox - transition to DOAC today  Hematology consulted, eval noted  D/w Dr. Hobbs , no indication for IVC filter and will f/u as out patient after discharge

## 2020-05-09 NOTE — PROGRESS NOTE ADULT - SUBJECTIVE AND OBJECTIVE BOX
Interval History:  complains of LUQ discomfort    Chart reviewed and events noted;   Overnight events:    MEDICATIONS  (STANDING):  enoxaparin Injectable 70 milliGRAM(s) SubCutaneous every 12 hours  mirtazapine 15 milliGRAM(s) Oral at bedtime  PARoxetine 20 milliGRAM(s) Oral daily    MEDICATIONS  (PRN):      Vital Signs Last 24 Hrs  T(C): 36.6 (09 May 2020 05:14), Max: 36.7 (08 May 2020 21:05)  T(F): 97.9 (09 May 2020 05:14), Max: 98 (08 May 2020 21:05)  HR: 95 (09 May 2020 07:46) (92 - 103)  BP: 100/67 (09 May 2020 05:14) (96/60 - 100/67)  BP(mean): --  RR: 18 (09 May 2020 05:14) (18 - 18)  SpO2: 95% (09 May 2020 05:14) (94% - 95%)    PHYSICAL EXAM  General: adult in NAD  HEENT: clear oropharynx, anicteric sclera, pink conjunctivae  Neck: supple  CV: normal S1S2 with no murmur rubs or gallops  Lungs: clear to auscultation, no wheezes, no rhales  Abdomen: soft non-tender non-distended, no hepato/splenomegaly. no ecchymoses. minimal tenderness  Ext: no clubbing cyanosis or edema  Skin: no rashes and no petichiae  Neuro: alert and oriented X3 no focal deficits      LABS:  CBC Full  -  ( 07 May 2020 20:12 )  WBC Count : 10.48 K/uL  RBC Count : 4.68 M/uL  Hemoglobin : 14.4 g/dL  Hematocrit : 41.7 %  Platelet Count - Automated : 140 K/uL  Mean Cell Volume : 89.1 fl  Mean Cell Hemoglobin : 30.8 pg  Mean Cell Hemoglobin Concentration : 34.5 gm/dL  Auto Neutrophil # : 8.58 K/uL  Auto Lymphocyte # : 0.94 K/uL  Auto Monocyte # : 0.83 K/uL  Auto Eosinophil # : 0.07 K/uL  Auto Basophil # : 0.02 K/uL  Auto Neutrophil % : 81.8 %  Auto Lymphocyte % : 9.0 %  Auto Monocyte % : 7.9 %  Auto Eosinophil % : 0.7 %  Auto Basophil % : 0.2 %    05-07    143  |  111<H>  |  24<H>  ----------------------------<  85  3.8   |  21<L>  |  1.20    Ca    7.6<L>      07 May 2020 20:12  Mg     2.2     05-07    TPro  6.2  /  Alb  2.7<L>  /  TBili  1.1  /  DBili  .50<H>  /  AST  19  /  ALT  24  /  AlkPhos  41  05-09    PT/INR - ( 09 May 2020 06:38 )   PT: 16.6 sec;   INR: 1.46 ratio         PTT - ( 09 May 2020 06:38 )  PTT:30.6 sec    fe studies      WBC trend  10.48 K/uL (05-07-20 @ 20:12)      Hgb trend  14.4 g/dL (05-07-20 @ 20:12)      plt trend  140 K/uL (05-07-20 @ 20:12)        RADIOLOGY & ADDITIONAL STUDIES:

## 2020-05-09 NOTE — PROGRESS NOTE ADULT - SUBJECTIVE AND OBJECTIVE BOX
INTERVAL HPI/OVERNIGHT EVENTS:  pt seen and examined offers no gi complaints  tolerating po    MEDICATIONS  (STANDING):  enoxaparin Injectable 70 milliGRAM(s) SubCutaneous every 12 hours  mirtazapine 15 milliGRAM(s) Oral at bedtime  PARoxetine 20 milliGRAM(s) Oral daily    MEDICATIONS  (PRN):      Allergies    No Known Allergies    Intolerances        Review of Systems:    General:  No wt loss, fevers, chills, night sweats, fatigue   Eyes:  Good vision, no reported pain  ENT:  No sore throat, pain, runny nose, dysphagia  CV:  No pain, palpitations, hypo/hypertension  Resp:  No dyspnea, cough, tachypnea, wheezing  GI:  No pain, No nausea, No vomiting, No diarrhea, No constipation, No weight loss, No fever, No pruritis, No rectal bleeding, No melena, No dysphagia  :  No pain, bleeding, incontinence, nocturia  Muscle:  No pain, weakness  Neuro:  No weakness, tingling, memory problems  Psych:  No fatigue, insomnia, mood problems, depression  Endocrine:  No polyuria, polydypsia, cold/heat intolerance  Heme:  No petechiae, ecchymosis, easy bruisability  Skin:  No rash, tattoos, scars, edema      Vital Signs Last 24 Hrs  T(C): 36.6 (09 May 2020 05:14), Max: 36.7 (08 May 2020 21:05)  T(F): 97.9 (09 May 2020 05:14), Max: 98 (08 May 2020 21:05)  HR: 95 (09 May 2020 07:46) (92 - 103)  BP: 100/67 (09 May 2020 05:14) (96/60 - 100/67)  BP(mean): --  RR: 18 (09 May 2020 05:14) (18 - 18)  SpO2: 95% (09 May 2020 05:14) (94% - 95%)    PHYSICAL EXAM:    General:  nad  HEENT:  NC/AT  Abdomen:  Soft, non-tender, mild dt  Extremities:  no edema  Neuro/Psych:  A&Ox3      LABS:                        14.4   10.48 )-----------( 140      ( 07 May 2020 20:12 )             41.7     05-07    143  |  111<H>  |  24<H>  ----------------------------<  85  3.8   |  21<L>  |  1.20    Ca    7.6<L>      07 May 2020 20:12  Mg     2.2     -    TPro  6.2  /  Alb  2.7<L>  /  TBili  1.1  /  DBili  .50<H>  /  AST  19  /  ALT  24  /  AlkPhos  41  05-    PT/INR - ( 09 May 2020 06:38 )   PT: 16.6 sec;   INR: 1.46 ratio         PTT - ( 09 May 2020 06:38 )  PTT:30.6 sec  Urinalysis Basic - ( 08 May 2020 04:04 )    Color: Yellow / Appearance: Turbid / S.020 / pH: x  Gluc: x / Ketone: Small  / Bili: Small / Urobili: 4   Blood: x / Protein: 30 mg/dL / Nitrite: Negative   Leuk Esterase: Trace / RBC: 11-25 /HPF / WBC 3-5   Sq Epi: x / Non Sq Epi: Negative / Bacteria: Few        RADIOLOGY & ADDITIONAL TESTS:

## 2020-05-10 DIAGNOSIS — R31.9 HEMATURIA, UNSPECIFIED: ICD-10-CM

## 2020-05-10 LAB
ALBUMIN SERPL ELPH-MCNC: 2.6 G/DL — LOW (ref 3.3–5)
ALP SERPL-CCNC: 36 U/L — LOW (ref 40–120)
ALT FLD-CCNC: 18 U/L — SIGNIFICANT CHANGE UP (ref 12–78)
AMMONIA BLD-MCNC: 23 UMOL/L — SIGNIFICANT CHANGE UP (ref 11–32)
ANION GAP SERPL CALC-SCNC: 7 MMOL/L — SIGNIFICANT CHANGE UP (ref 5–17)
AST SERPL-CCNC: 18 U/L — SIGNIFICANT CHANGE UP (ref 15–37)
BILIRUB SERPL-MCNC: 1.1 MG/DL — SIGNIFICANT CHANGE UP (ref 0.2–1.2)
BUN SERPL-MCNC: 14 MG/DL — SIGNIFICANT CHANGE UP (ref 7–23)
CALCIUM SERPL-MCNC: 8 MG/DL — LOW (ref 8.5–10.1)
CHLORIDE SERPL-SCNC: 107 MMOL/L — SIGNIFICANT CHANGE UP (ref 96–108)
CO2 SERPL-SCNC: 26 MMOL/L — SIGNIFICANT CHANGE UP (ref 22–31)
CREAT SERPL-MCNC: 1.1 MG/DL — SIGNIFICANT CHANGE UP (ref 0.5–1.3)
FOLATE SERPL-MCNC: 4.5 NG/ML — LOW
GLUCOSE SERPL-MCNC: 109 MG/DL — HIGH (ref 70–99)
HCT VFR BLD CALC: 36.4 % — LOW (ref 39–50)
HGB BLD-MCNC: 12.8 G/DL — LOW (ref 13–17)
MCHC RBC-ENTMCNC: 30.7 PG — SIGNIFICANT CHANGE UP (ref 27–34)
MCHC RBC-ENTMCNC: 35.2 GM/DL — SIGNIFICANT CHANGE UP (ref 32–36)
MCV RBC AUTO: 87.3 FL — SIGNIFICANT CHANGE UP (ref 80–100)
NRBC # BLD: 0 /100 WBCS — SIGNIFICANT CHANGE UP (ref 0–0)
PLATELET # BLD AUTO: 155 K/UL — SIGNIFICANT CHANGE UP (ref 150–400)
POTASSIUM SERPL-MCNC: 3.1 MMOL/L — LOW (ref 3.5–5.3)
POTASSIUM SERPL-SCNC: 3.1 MMOL/L — LOW (ref 3.5–5.3)
PROT SERPL-MCNC: 5.9 G/DL — LOW (ref 6–8.3)
RBC # BLD: 4.17 M/UL — LOW (ref 4.2–5.8)
RBC # FLD: 12.9 % — SIGNIFICANT CHANGE UP (ref 10.3–14.5)
SODIUM SERPL-SCNC: 140 MMOL/L — SIGNIFICANT CHANGE UP (ref 135–145)
TSH SERPL-MCNC: 2.53 UIU/ML — SIGNIFICANT CHANGE UP (ref 0.36–3.74)
VIT B12 SERPL-MCNC: 857 PG/ML — SIGNIFICANT CHANGE UP (ref 232–1245)
WBC # BLD: 8.67 K/UL — SIGNIFICANT CHANGE UP (ref 3.8–10.5)
WBC # FLD AUTO: 8.67 K/UL — SIGNIFICANT CHANGE UP (ref 3.8–10.5)

## 2020-05-10 PROCEDURE — 93306 TTE W/DOPPLER COMPLETE: CPT | Mod: 26

## 2020-05-10 PROCEDURE — 99233 SBSQ HOSP IP/OBS HIGH 50: CPT

## 2020-05-10 PROCEDURE — 99232 SBSQ HOSP IP/OBS MODERATE 35: CPT

## 2020-05-10 RX ORDER — POLYETHYLENE GLYCOL 3350 17 G/17G
17 POWDER, FOR SOLUTION ORAL DAILY
Refills: 0 | Status: DISCONTINUED | OUTPATIENT
Start: 2020-05-10 | End: 2020-05-11

## 2020-05-10 RX ORDER — PANTOPRAZOLE SODIUM 20 MG/1
40 TABLET, DELAYED RELEASE ORAL
Refills: 0 | Status: DISCONTINUED | OUTPATIENT
Start: 2020-05-10 | End: 2020-05-15

## 2020-05-10 RX ORDER — POTASSIUM CHLORIDE 20 MEQ
40 PACKET (EA) ORAL EVERY 4 HOURS
Refills: 0 | Status: COMPLETED | OUTPATIENT
Start: 2020-05-10 | End: 2020-05-10

## 2020-05-10 RX ADMIN — POLYETHYLENE GLYCOL 3350 17 GRAM(S): 17 POWDER, FOR SOLUTION ORAL at 11:40

## 2020-05-10 RX ADMIN — RIVAROXABAN 15 MILLIGRAM(S): KIT at 17:37

## 2020-05-10 RX ADMIN — Medication 40 MILLIEQUIVALENT(S): at 17:37

## 2020-05-10 RX ADMIN — Medication 20 MILLIGRAM(S): at 11:40

## 2020-05-10 RX ADMIN — Medication 40 MILLIEQUIVALENT(S): at 13:57

## 2020-05-10 RX ADMIN — MIRTAZAPINE 15 MILLIGRAM(S): 45 TABLET, ORALLY DISINTEGRATING ORAL at 21:32

## 2020-05-10 RX ADMIN — RIVAROXABAN 15 MILLIGRAM(S): KIT at 11:40

## 2020-05-10 NOTE — PROGRESS NOTE ADULT - SUBJECTIVE AND OBJECTIVE BOX
This progress note was completed in part by resident acting as telephonic scribe during COVID-19 crisis. Physical exam was completed by attending physician, and findings below are those of the attending physician, and have been reviewed in detail.    Patient is a 64y old  Male who presents with a chief complaint of Weakness (10 May 2020 10:32)      INTERVAL HPI/OVERNIGHT EVENTS: Pt seen and examined. Denies any suicidal ideas. Denies chest pain, palpitation, sob     MEDICATIONS  (STANDING):  mirtazapine 15 milliGRAM(s) Oral at bedtime  pantoprazole    Tablet 40 milliGRAM(s) Oral before breakfast  PARoxetine 20 milliGRAM(s) Oral daily  polyethylene glycol 3350 17 Gram(s) Oral daily  rivaroxaban 15 milliGRAM(s) Oral two times a day with meals    MEDICATIONS  (PRN):      Allergies    No Known Allergies    Intolerances        REVIEW OF SYSTEMS:  CONSTITUTIONAL: No fever,  or fatigue  EYES: No eye pain, visual disturbances, or discharge  ENMT:  No difficulty hearing, tinnitus, vertigo; No sinus or throat pain  NECK: No pain or stiffness  RESPIRATORY: No cough, wheezing, chills or hemoptysis; No shortness of breath  CARDIOVASCULAR: No chest pain, palpitations, dizziness, or leg swelling  GASTROINTESTINAL: No abdominal or epigastric pain. No nausea, vomiting, or hematemesis; No diarrhea or constipation. No melena or hematochezia. Last BM 2-3 days ago.   GENITOURINARY: Episode of hematuria overnight. No dysuria, frequency, or incontinence  NEUROLOGICAL: No headaches, memory loss, loss of strength, numbness, or tremors  SKIN: No itching, burning, rashes, or lesions   ENDOCRINE: No heat or cold intolerance; No hair loss; No polydipsia or polyuria  MUSCULOSKELETAL: No joint pain or swelling; No muscle, back, or extremity pain  PSYCHIATRIC: No depression, anxiety, mood swings, or difficulty sleeping    Vital Signs Last 24 Hrs  T(C): 36.7 (10 May 2020 04:30), Max: 36.7 (10 May 2020 04:30)  T(F): 98.1 (10 May 2020 04:30), Max: 98.1 (10 May 2020 04:30)  HR: 87 (10 May 2020 04:30) (87 - 99)  BP: 102/63 (10 May 2020 04:30) (102/63 - 107/70)  BP(mean): --  RR: 17 (10 May 2020 04:30) (17 - 18)  SpO2: 95% (10 May 2020 04:30) (92% - 95%)    PHYSICAL EXAM:  GENERAL: NAD,  well-developed  HEAD:  Atraumatic, Normocephalic  EYES: EOMI, PERRLA, conjunctiva and sclera clear  ENMT: No tonsillar erythema, exudates, or enlargement; Moist mucous membranes, Good dentition, No lesions  NECK: Supple, No JVD, Normal thyroid  NERVOUS SYSTEM:  Alert & Oriented X3, Good concentration; Motor Strength 5/5 B/L upper and lower extremities; DTRs 2+ intact and symmetric  CHEST/LUNG: Clear to auscultation bilaterally; No rales, rhonchi, wheezing, or rubs  HEART: Regular rate and rhythm; No murmurs, rubs, or gallops  ABDOMEN: Soft, Nontender, Nondistended; Bowel sounds present  EXTREMITIES:  2+ Peripheral Pulses, No clubbing, cyanosis, or edema  LYMPH: No lymphadenopathy noted  SKIN: No rashes or lesions    LABS:           CAPILLARY BLOOD GLUCOSE            BLOOD CULTURE  Culture - Blood (05.07.20 @ 22:20)    Specimen Source: .Blood Blood    Culture Results:   No growth to date.    Culture - Urine (05.08.20 @ 09:09)    Specimen Source: .Urine Clean Catch (Midstream)    Culture Results:   No growth        RADIOLOGY & ADDITIONAL TESTS:  < from: CT Abdomen and Pelvis No Cont (05.07.20 @ 20:32) >    EXAM:  CT CHEST                          EXAM:  CT ABDOMEN AND PELVIS                            PROCEDURE DATE:  05/07/2020          INTERPRETATION:  CLINICAL INFORMATION: Status post fall    COMPARISON: None.    PROCEDURE:   CT of the Chest, Abdomen and Pelvis was performed without intravenous contrast.   Intravenous contrast: None.  Oral contrast: None.  Sagittal and coronal reformats were performed.    FINDINGS:    CHEST:     LUNGS AND LARGE AIRWAYS: Patent central airways. No pulmonary nodules. Mild bilateral lower lobe subsegmental atelectasis.  PLEURA: No pleural effusion.  VESSELS: Normal caliber aorta. Mild coronary artery calcification.  HEART: Heart size is normal. No pericardial effusion.  MEDIASTINUM AND SELVIN: No lymphadenopathy.  CHEST WALL AND LOWER NECK: Within normal limits.    ABDOMEN AND PELVIS:    LIVER: Within normal limits.  BILE DUCTS: Normal caliber.  GALLBLADDER: Multiple gallstones.  SPLEEN: Within normal limits.  PANCREAS: Within normal limits.  ADRENALS: Within normal limits.  KIDNEYS/URETERS: 1.9 cm nonobstructing left upper pole renal stone. Partial staghorn left renal calculus with multiple small stones versus fragmented larger stone in the left renal pelvis. No hydronephrosis. 2.7 cm hypodense lesion in the posterior aspect of the left kidney which is unable to be characterized on this unenhanced CT scan.    BLADDER: Within normal limits.  REPRODUCTIVE ORGANS: Prostate within normal limits.    BOWEL: No bowel obstruction. Appendix not identified. Mild colonic diverticulosis.  PERITONEUM: No ascites or hemoperitoneum.  VESSELS: Mild atherosclerotic arterial calcifications.  RETROPERITONEUM/LYMPH NODES: No lymphadenopathy.    ABDOMINAL WALL: Within normal limits.  BONES: No fractures.    IMPRESSION:     No traumatic injury.  Extensive nonobstructing left renal stones, including a partial staghorn calculus.          WILLIAMS GARDNER M.D.,ATTENDING RADIOLOGIST  This document has been electronically signed. May  7 2020  9:05PM                < end of copied text >      Imaging Personally Reviewed:  [x] YES This progress note was completed in part by resident acting as telephonic scribe during COVID-19 crisis. Physical exam was completed by attending physician, and findings below are those of the attending physician, and have been reviewed in detail.    Patient is a 64y old  Male who presents with a chief complaint of Weakness (10 May 2020 10:32)      INTERVAL HPI/OVERNIGHT EVENTS: Pt seen and examined. Denies any suicidal ideas. Denies chest pain, palpitation, sob     MEDICATIONS  (STANDING):  mirtazapine 15 milliGRAM(s) Oral at bedtime  pantoprazole    Tablet 40 milliGRAM(s) Oral before breakfast  PARoxetine 20 milliGRAM(s) Oral daily  polyethylene glycol 3350 17 Gram(s) Oral daily  potassium chloride    Tablet ER 40 milliEquivalent(s) Oral every 4 hours  rivaroxaban 15 milliGRAM(s) Oral two times a day with meals    MEDICATIONS  (PRN):      Allergies    No Known Allergies    Intolerances        REVIEW OF SYSTEMS:  CONSTITUTIONAL: No fever,  or fatigue  EYES: No eye pain, visual disturbances, or discharge  ENMT:  No difficulty hearing, tinnitus, vertigo; No sinus or throat pain  NECK: No pain or stiffness  RESPIRATORY: No cough, wheezing, chills or hemoptysis; No shortness of breath  CARDIOVASCULAR: No chest pain, palpitations, dizziness, or leg swelling  GASTROINTESTINAL: No abdominal or epigastric pain. No nausea, vomiting, or hematemesis; No diarrhea or constipation. No melena or hematochezia. Last BM 2-3 days ago.   GENITOURINARY: Episode of hematuria overnight. No dysuria, frequency, or incontinence  NEUROLOGICAL: No headaches, memory loss, loss of strength, numbness, or tremors  SKIN: No itching, burning, rashes, or lesions   ENDOCRINE: No heat or cold intolerance; No hair loss; No polydipsia or polyuria  MUSCULOSKELETAL: No joint pain or swelling; No muscle, back, or extremity pain  PSYCHIATRIC: No depression, anxiety, mood swings, or difficulty sleeping    Vital Signs Last 24 Hrs  T(C): 36.7 (10 May 2020 04:30), Max: 36.7 (10 May 2020 04:30)  T(F): 98.1 (10 May 2020 04:30), Max: 98.1 (10 May 2020 04:30)  HR: 87 (10 May 2020 04:30) (87 - 99)  BP: 102/63 (10 May 2020 04:30) (102/63 - 107/70)  BP(mean): --  RR: 17 (10 May 2020 04:30) (17 - 18)  SpO2: 95% (10 May 2020 04:30) (92% - 95%)    PHYSICAL EXAM:  GENERAL: NAD,  well-developed  HEAD:  Atraumatic, Normocephalic  EYES: EOMI, PERRLA, conjunctiva and sclera clear  ENMT: No tonsillar erythema, exudates, or enlargement; Moist mucous membranes, Good dentition, No lesions  NECK: Supple, No JVD, Normal thyroid  NERVOUS SYSTEM:  Alert & Oriented X3, Good concentration; Motor Strength 5/5 B/L upper and lower extremities; DTRs 2+ intact and symmetric  CHEST/LUNG: Clear to auscultation bilaterally; No rales, rhonchi, wheezing, or rubs  HEART: Regular rate and rhythm; No murmurs, rubs, or gallops  ABDOMEN: Soft, Nontender, Nondistended; Bowel sounds present  EXTREMITIES:  2+ Peripheral Pulses, No clubbing, cyanosis, or edema  LYMPH: No lymphadenopathy noted  SKIN: No rashes or lesions    LABS:                           12.8   8.67  )-----------( 155      ( 10 May 2020 11:02 )             36.4   05-10    140  |  107  |  14  ----------------------------<  109<H>  3.1<L>   |  26  |  1.10    Ca    8.0<L>      10 May 2020 11:02    TPro  5.9<L>  /  Alb  2.6<L>  /  TBili  1.1  /  DBili  x   /  AST  18  /  ALT  18  /  AlkPhos  36<L>  05-10        CAPILLARY BLOOD GLUCOSE            BLOOD CULTURE  Culture - Blood (05.07.20 @ 22:20)    Specimen Source: .Blood Blood    Culture Results:   No growth to date.    Culture - Urine (05.08.20 @ 09:09)    Specimen Source: .Urine Clean Catch (Midstream)    Culture Results:   No growth        RADIOLOGY & ADDITIONAL TESTS:  < from: CT Abdomen and Pelvis No Cont (05.07.20 @ 20:32) >    EXAM:  CT CHEST                          EXAM:  CT ABDOMEN AND PELVIS                            PROCEDURE DATE:  05/07/2020          INTERPRETATION:  CLINICAL INFORMATION: Status post fall    COMPARISON: None.    PROCEDURE:   CT of the Chest, Abdomen and Pelvis was performed without intravenous contrast.   Intravenous contrast: None.  Oral contrast: None.  Sagittal and coronal reformats were performed.    FINDINGS:    CHEST:     LUNGS AND LARGE AIRWAYS: Patent central airways. No pulmonary nodules. Mild bilateral lower lobe subsegmental atelectasis.  PLEURA: No pleural effusion.  VESSELS: Normal caliber aorta. Mild coronary artery calcification.  HEART: Heart size is normal. No pericardial effusion.  MEDIASTINUM AND SELVIN: No lymphadenopathy.  CHEST WALL AND LOWER NECK: Within normal limits.    ABDOMEN AND PELVIS:    LIVER: Within normal limits.  BILE DUCTS: Normal caliber.  GALLBLADDER: Multiple gallstones.  SPLEEN: Within normal limits.  PANCREAS: Within normal limits.  ADRENALS: Within normal limits.  KIDNEYS/URETERS: 1.9 cm nonobstructing left upper pole renal stone. Partial staghorn left renal calculus with multiple small stones versus fragmented larger stone in the left renal pelvis. No hydronephrosis. 2.7 cm hypodense lesion in the posterior aspect of the left kidney which is unable to be characterized on this unenhanced CT scan.    BLADDER: Within normal limits.  REPRODUCTIVE ORGANS: Prostate within normal limits.    BOWEL: No bowel obstruction. Appendix not identified. Mild colonic diverticulosis.  PERITONEUM: No ascites or hemoperitoneum.  VESSELS: Mild atherosclerotic arterial calcifications.  RETROPERITONEUM/LYMPH NODES: No lymphadenopathy.    ABDOMINAL WALL: Within normal limits.  BONES: No fractures.    IMPRESSION:     No traumatic injury.  Extensive nonobstructing left renal stones, including a partial staghorn calculus.          WILLIAMS GARDNER M.D.,ATTENDING RADIOLOGIST  This document has been electronically signed. May  7 2020  9:05PM                < end of copied text >      Imaging Personally Reviewed:  [x] YES This progress note was completed in part by resident acting as telephonic scribe during COVID-19 crisis. Physical exam was completed by attending physician, and findings below are those of the attending physician, and have been reviewed in detail.    Patient is a 64y old  Male who presents with a chief complaint of Weakness (10 May 2020 10:32)      INTERVAL HPI/OVERNIGHT EVENTS: Pt seen and examined. Denies any suicidal ideas. Denies chest pain, palpitation, sob . Had some hematuria last night.    MEDICATIONS  (STANDING):  mirtazapine 15 milliGRAM(s) Oral at bedtime  pantoprazole    Tablet 40 milliGRAM(s) Oral before breakfast  PARoxetine 20 milliGRAM(s) Oral daily  polyethylene glycol 3350 17 Gram(s) Oral daily  potassium chloride    Tablet ER 40 milliEquivalent(s) Oral every 4 hours  rivaroxaban 15 milliGRAM(s) Oral two times a day with meals    MEDICATIONS  (PRN):      Allergies    No Known Allergies    Intolerances        REVIEW OF SYSTEMS:  CONSTITUTIONAL: No fever,  or fatigue  EYES: No eye pain, visual disturbances, or discharge  ENMT:  No difficulty hearing, tinnitus, vertigo; No sinus or throat pain  NECK: No pain or stiffness  RESPIRATORY: No cough, wheezing, chills or hemoptysis; No shortness of breath  CARDIOVASCULAR: No chest pain, palpitations, dizziness, or leg swelling  GASTROINTESTINAL: No abdominal or epigastric pain. No nausea, vomiting, or hematemesis; No diarrhea or constipation. No melena or hematochezia. Last BM 2-3 days ago.   GENITOURINARY: Episode of hematuria overnight. No dysuria, frequency, or incontinence  NEUROLOGICAL: No headaches, memory loss, loss of strength, numbness, or tremors  SKIN: No itching, burning, rashes, or lesions   ENDOCRINE: No heat or cold intolerance; No hair loss; No polydipsia or polyuria  MUSCULOSKELETAL: No joint pain or swelling; No muscle, back, or extremity pain  PSYCHIATRIC: No depression, anxiety, mood swings, or difficulty sleeping    Vital Signs Last 24 Hrs  T(C): 36.7 (10 May 2020 04:30), Max: 36.7 (10 May 2020 04:30)  T(F): 98.1 (10 May 2020 04:30), Max: 98.1 (10 May 2020 04:30)  HR: 87 (10 May 2020 04:30) (87 - 99)  BP: 102/63 (10 May 2020 04:30) (102/63 - 107/70)  BP(mean): --  RR: 17 (10 May 2020 04:30) (17 - 18)  SpO2: 95% (10 May 2020 04:30) (92% - 95%)    PHYSICAL EXAM:  GENERAL: NAD,  well-developed  HEAD:  Atraumatic, Normocephalic  EYES: EOMI, PERRLA, conjunctiva and sclera clear  ENMT: No tonsillar erythema, exudates, or enlargement; Moist mucous membranes, Good dentition, No lesions  NECK: Supple, No JVD, Normal thyroid  NERVOUS SYSTEM:  Alert & Oriented X3, Good concentration; Motor Strength 5/5 B/L upper and lower extremities; DTRs 2+ intact and symmetric  CHEST/LUNG: Clear to auscultation bilaterally; No rales, rhonchi, wheezing, or rubs  HEART: Regular rate and rhythm; No murmurs, rubs, or gallops  ABDOMEN: Soft, Nontender, Nondistended; Bowel sounds present  EXTREMITIES:  2+ Peripheral Pulses, No clubbing, cyanosis, or edema  LYMPH: No lymphadenopathy noted  SKIN: No rashes or lesions    LABS:                           12.8   8.67  )-----------( 155      ( 10 May 2020 11:02 )             36.4   05-10    140  |  107  |  14  ----------------------------<  109<H>  3.1<L>   |  26  |  1.10    Ca    8.0<L>      10 May 2020 11:02    TPro  5.9<L>  /  Alb  2.6<L>  /  TBili  1.1  /  DBili  x   /  AST  18  /  ALT  18  /  AlkPhos  36<L>  05-10        CAPILLARY BLOOD GLUCOSE            BLOOD CULTURE  Culture - Blood (05.07.20 @ 22:20)    Specimen Source: .Blood Blood    Culture Results:   No growth to date.    Culture - Urine (05.08.20 @ 09:09)    Specimen Source: .Urine Clean Catch (Midstream)    Culture Results:   No growth        RADIOLOGY & ADDITIONAL TESTS:  < from: CT Abdomen and Pelvis No Cont (05.07.20 @ 20:32) >    EXAM:  CT CHEST                          EXAM:  CT ABDOMEN AND PELVIS                            PROCEDURE DATE:  05/07/2020          INTERPRETATION:  CLINICAL INFORMATION: Status post fall    COMPARISON: None.    PROCEDURE:   CT of the Chest, Abdomen and Pelvis was performed without intravenous contrast.   Intravenous contrast: None.  Oral contrast: None.  Sagittal and coronal reformats were performed.    FINDINGS:    CHEST:     LUNGS AND LARGE AIRWAYS: Patent central airways. No pulmonary nodules. Mild bilateral lower lobe subsegmental atelectasis.  PLEURA: No pleural effusion.  VESSELS: Normal caliber aorta. Mild coronary artery calcification.  HEART: Heart size is normal. No pericardial effusion.  MEDIASTINUM AND SELVIN: No lymphadenopathy.  CHEST WALL AND LOWER NECK: Within normal limits.    ABDOMEN AND PELVIS:    LIVER: Within normal limits.  BILE DUCTS: Normal caliber.  GALLBLADDER: Multiple gallstones.  SPLEEN: Within normal limits.  PANCREAS: Within normal limits.  ADRENALS: Within normal limits.  KIDNEYS/URETERS: 1.9 cm nonobstructing left upper pole renal stone. Partial staghorn left renal calculus with multiple small stones versus fragmented larger stone in the left renal pelvis. No hydronephrosis. 2.7 cm hypodense lesion in the posterior aspect of the left kidney which is unable to be characterized on this unenhanced CT scan.    BLADDER: Within normal limits.  REPRODUCTIVE ORGANS: Prostate within normal limits.    BOWEL: No bowel obstruction. Appendix not identified. Mild colonic diverticulosis.  PERITONEUM: No ascites or hemoperitoneum.  VESSELS: Mild atherosclerotic arterial calcifications.  RETROPERITONEUM/LYMPH NODES: No lymphadenopathy.    ABDOMINAL WALL: Within normal limits.  BONES: No fractures.    IMPRESSION:     No traumatic injury.  Extensive nonobstructing left renal stones, including a partial staghorn calculus.          WILLIAMS GRADNER M.D.,ATTENDING RADIOLOGIST  This document has been electronically signed. May  7 2020  9:05PM                < end of copied text >      Imaging Personally Reviewed:  [x] YES

## 2020-05-10 NOTE — PROGRESS NOTE ADULT - ASSESSMENT
64/M without any significant known PMHx who presented to the ED c/o worsening weakness over the past 1 month with associated chills, and syncope, admitted for r/o COVID19 and syncope workup. Also found to have depression and suicidal ideation with significant social concerns but now better and states that he was stupid to think that way 64/M without any significant known PMHx who presented to the ED c/o worsening weakness over the past 1 month with associated chills, and syncope, admitted for r/o COVID19 and syncope workup. Also found to have depression and suicidal ideation with significant social concerns

## 2020-05-10 NOTE — PROGRESS NOTE ADULT - PROBLEM SELECTOR PLAN 4
Patient with worsening weakness x 1 month, likely multifactorial etiology - psych, poor PO intake, deconditioning from lack of activity  -Encourage PO intake  -At baseline patient able to ambulate without assistive devices, states he is "active"  -Nutrition consult, PT consult  -check serum B12, folate, TSH

## 2020-05-10 NOTE — PROGRESS NOTE ADULT - PROBLEM SELECTOR PLAN 1
Patient presenting with worsening weakness, fatigue, over the past 1 month with 2 admitted falls in his home approx 2 weeks prior to admission with associated LOC and preceding dizziness. No associated chest pain, palpitations, SOB, vision changes  -Patient attributes to Poor PO intake, feels like he does not have significant appetite   -S/p 2L NS bolus in ED, encourage PO hydration  -CT head negative for ICH or gross structural abnormality, CT cspine without acute fracture  -admission labs wnl, unlikely metabolic component.   -Patient without any outpatient followup ~ 30 years, likely with some chronic unaddressed issues  - Carotid doppler negative  -TTE result pending   - Neuro, cardio consults appreciated

## 2020-05-10 NOTE — PROGRESS NOTE ADULT - PROBLEM SELECTOR PLAN 9
- on xarelto for tx of dvt   -Social work consult - living situation, possible need for medicare - on xarelto for tx of dvt   -GI ppx with PPI   -Social work consult - living situation, possible need for medicare

## 2020-05-10 NOTE — PROGRESS NOTE ADULT - PROBLEM SELECTOR PLAN 8
Patient found to have plantar wart on the plantar surface of the right 3rd toe  -Likely chronic. No other obvious verrucous lesions appreciated  -Podiatry consulted

## 2020-05-10 NOTE — PROGRESS NOTE ADULT - SUBJECTIVE AND OBJECTIVE BOX
[facility] -- Lizzy Gonsales, Rosibel, Tara, Ayo Blanco, Aron Alexander: Office # [phone]    Follow Up:  Near syncope     Subjective/Observations: Patient seen and examined. Patient awake and alert, resting comfortably in bed. No complaints of chest pain, SOB, LE edema, cough. No signs of orthopnea or PND. Patient stated he had an episode where he went to bathroom overnight, had pain with urination and noted urine to be bloody. Patient also felt chills after that. Patient states he feels depressed. Tolerating room air.     REVIEW OF SYSTEMS: All review of systems is negative for eye, ENT, GI, , allergic, dermatologic, musculoskeletal and neurologic except as described above    PAST MEDICAL & SURGICAL HISTORY:  No pertinent past medical history  History of vocal cord polypectomy  Benign parotid tumor: 1983  History of appendectomy: 1965    MEDICATIONS  (STANDING):  mirtazapine 15 milliGRAM(s) Oral at bedtime  pantoprazole    Tablet 40 milliGRAM(s) Oral before breakfast  PARoxetine 20 milliGRAM(s) Oral daily  polyethylene glycol 3350 17 Gram(s) Oral daily  rivaroxaban 15 milliGRAM(s) Oral two times a day with meals    MEDICATIONS  (PRN):    Allergies  No Known Allergies    Vital Signs Last 24 Hrs  T(C): 36.7 (10 May 2020 04:30), Max: 36.7 (10 May 2020 04:30)  T(F): 98.1 (10 May 2020 04:30), Max: 98.1 (10 May 2020 04:30)  HR: 87 (10 May 2020 04:30) (87 - 99)  BP: 102/63 (10 May 2020 04:30) (102/63 - 107/70)  BP(mean): --  RR: 17 (10 May 2020 04:30) (17 - 18)  SpO2: 95% (10 May 2020 04:30) (92% - 95%)    I&O's Summary     TELE: Not on telemetry   PHYSICAL EXAM:  Appearance: NAD, no distress, alert, Well developed   HEENT: Moist Mucous Membranes, Anicteric  Cardiovascular: Regular rate and rhythm, Normal S1 S2, No JVD, No murmurs, No rubs, gallops or clicks  Respiratory: Non-labored, Clear to auscultation, No rales, No rhonchi, No wheezing.   Gastrointestinal:  Soft, Non-tender, + BS  Neurologic: Non-focal  Skin: Warm and dry, No visible rashes or ulcers, No ecchymosis, No cyanosis  Musculoskeletal: No clubbing, No cyanosis, No joint swelling/tenderness  Psychiatry: Mood & affect appropriate  Lymph: No peripheral edema.     LABS: All Labs Reviewed:                        14.4   10.48 )-----------( 140      ( 07 May 2020 20:12 )             41.7     07 May 2020 20:12    143    |  111    |  24     ----------------------------<  85     3.8     |  21     |  1.20     Ca    7.6        07 May 2020 20:12  Mg     2.2       07 May 2020 20:12    TPro  6.2    /  Alb  2.7    /  TBili  1.1    /  DBili  .50    /  AST  19     /  ALT  24     /  AlkPhos  41     09 May 2020 06:38  TPro  6.0    /  Alb  2.8    /  TBili  2.2    /  DBili  x      /  AST  20     /  ALT  22     /  AlkPhos  39     07 May 2020 20:12  PT/INR - ( 09 May 2020 06:38 )   PT: 16.6 sec;   INR: 1.46 ratio    PTT - ( 09 May 2020 06:38 )  PTT:30.6 sec  Troponin I, Serum: .023 ng/mL (05-07-20 @ 20:12)  Lactate, Blood: 1.9 mmol/L (05-07-20 @ 20:12)    12 Lead ECG:   Ventricular Rate 113 BPM  Atrial Rate 113 BPM  P-R Interval 130 ms  QRS Duration 72 ms  Q-T Interval 368 ms  QTC Calculation(Bezet) 504 ms  P Axis -1 degrees  R Axis 0 degrees  T Axis 0 degrees  Diagnosis Line Sinus tachycardia  Junctional ST depression, probably normal  Borderline ECG  No previous ECGs available  Confirmed by Rosibel MOBLEY, Aneudy ([id number]) on 5/8/2020 1:36:50 PM (05-07-20 @ 17:29)    < from: US Duplex Carotid Arteries Complete, Bilateral (05.08.20 @ 09:50) >  Interpretation: Mild plaque formation is noted at both internal carotid arteries.  Blood flow velocities (cm/sec): (Normal is less than 125)  Right: Proximal CCA=67  Distal CCA=76  Prox ICA=35 Distal ICA=49  ECA=50  ICA/CCA ratio=0.7 (Normal= less than 2)  Left: Proximal CCA=102    Distal CCA=70  Prox ICA=38  Distal ICA=50      ECA=78  ICA/CCA ratio=0.7 (Normal= less than 2)    The right vertebral artery shows antegrade flow. The left vertebral artery shows antegrade flow.  IMPRESSION:  All peak systolic and end diastolic velocities are within normal limits. No sonographic evidence of hemodynamically significant stenosis.    < end of copied text >    < from: CT Chest No Cont (05.07.20 @ 20:34) >  FINDINGS:  CHEST:   LUNGS AND LARGE AIRWAYS: Patent central airways. No pulmonary nodules. Mild bilateral lower lobe subsegmental atelectasis.  PLEURA: No pleural effusion.  VESSELS: Normal caliber aorta. Mild coronary artery calcification.  HEART: Heart size is normal. No pericardial effusion.  MEDIASTINUM AND SELVIN: No lymphadenopathy.  CHEST WALL AND LOWER NECK: Within normal limits.    ABDOMEN AND PELVIS:  LIVER: Within normal limits.  BILE DUCTS: Normal caliber.  GALLBLADDER: Multiple gallstones.  SPLEEN: Within normal limits.  PANCREAS: Within normal limits.  ADRENALS: Within normal limits.  KIDNEYS/URETERS: 1.9 cm nonobstructing left upper pole renal stone. Partial staghorn left renal calculus with multiple small stones versus fragmented larger stone in the left renal pelvis. No hydronephrosis. 2.7 cm hypodense lesion in the posterior aspect of the left kidney which is unable to be characterized on this unenhanced CT scan.  BLADDER: Within normal limits.  REPRODUCTIVE ORGANS: Prostate within normal limits.  BOWEL: No bowel obstruction. Appendix not identified. Mild colonic diverticulosis.  PERITONEUM: No ascites or hemoperitoneum.  VESSELS: Mild atherosclerotic arterial calcifications.  RETROPERITONEUM/LYMPH NODES: No lymphadenopathy.    ABDOMINAL WALL: Within normal limits.  BONES: No fractures.    IMPRESSION:   No traumatic injury.  Extensive nonobstructing left renal stones, including a partial staghorn calculus.    < end of copied text >    < from: CT Head No Cont (05.07.20 @ 20:32) >  IMPRESSION:    No acute intracranial hemorrhage or acute territorial infarct.     < end of copied text >

## 2020-05-10 NOTE — PROGRESS NOTE ADULT - ASSESSMENT
Right leg DVT probably provoked related to immobility. has been bridged to xarelto   No evidence of malignancy on scans  family history notable for sister that may have past away from PE after a extremity stent procedure?  Depression with ?suicidal ideation    Recommendations:  1. to continue xarelto  2.  because of family history would consider hypercoaguability workup as an outpatient  3.  with first event and current history would treat for 3 months with repeat evaluation with doppler and didimer  4.  to evaluate further as an outpatient. please call if additional evaluation required.  thank you  discussed with Dr. Pritchett Right leg DVT probably provoked related to immobility. has been bridged to xarelto   No evidence of malignancy on scans  family history notable for sister that may have past away from PE after a extremity stent procedure?  Depression with ?suicidal ideation    Recommendations:  1. to continue xarelto and observe urine closely  2.  because of family history would consider hypercoaguability workup as an outpatient  3.  with first event and current history would treat for 3 months with repeat evaluation with doppler and didimer  4.  urology evaluation of stone and hematuria  discussed with Dr. Pritchett

## 2020-05-10 NOTE — PROGRESS NOTE ADULT - SUBJECTIVE AND OBJECTIVE BOX
Neurology follow up note    GARDENIA [id number]      Interval History:    Patient feels tired and sad     MEDICATIONS    mirtazapine 15 milliGRAM(s) Oral at bedtime  pantoprazole    Tablet 40 milliGRAM(s) Oral before breakfast  PARoxetine 20 milliGRAM(s) Oral daily  polyethylene glycol 3350 17 Gram(s) Oral daily  rivaroxaban 15 milliGRAM(s) Oral two times a day with meals      Allergies    No Known Allergies    Intolerances            Vital Signs Last 24 Hrs  T(C): 36.7 (10 May 2020 04:30), Max: 36.7 (10 May 2020 04:30)  T(F): 98.1 (10 May 2020 04:30), Max: 98.1 (10 May 2020 04:30)  HR: 87 (10 May 2020 04:30) (87 - 99)  BP: 102/63 (10 May 2020 04:30) (102/63 - 107/70)  BP(mean): --  RR: 17 (10 May 2020 04:30) (17 - 18)  SpO2: 95% (10 May 2020 04:30) (92% - 95%)    REVIEW OF SYSTEMS:  Constitutional:  The patient denies fever, chills, or night sweats at present.  Head:  No headaches.  Eyes:  No double vision or blurry vision.  Ears:  No ringing in the ears.  Neck:  No neck pain.  Respiratory:  No shortness of breath.  Cardiovascular:  No chest pain.  Abdomen:  No nausea, vomiting, or abdominal pain.  Extremities/Neurological:  No numbness or tingling.  Musculoskeletal:  No joint pain.  General:  Positive history for the last one month of lethargy, generalized weakness, fatigue, episode of loss of consciousness.    PHYSICAL EXAMINATION:  HEENT:  Head:  Normocephalic, atraumatic.  Eyes:  No scleral icterus.  Ears:  Hearing bilaterally intact.  NECK:  Supple.  RESPIRATORY:  Good air entry bilaterally.  CARDIOVASCULAR:  S1 and S2 heard.  ABDOMEN:  Soft and nontender.  EXTREMITIES:  No clubbing or cyanosis were noted.      NEUROLOGIC:  The patient is awake and alert.  Extraocular movements were intact.  Pupils were equal, round, and reactive bilaterally 3 mm to 2 mm.  Speech was fluent.  Smile was symmetric.  Motor:  Bilateral upper and lower were 5/5.  Sensory:  Bilateral upper and lower intact to light touch.              LABS:          TPro  6.2  /  Alb  2.7<L>  /  TBili  1.1  /  DBili  .50<H>  /  AST  19  /  ALT  24  /  AlkPhos  41  05-09    Hemoglobin A1C:     LIVER FUNCTIONS - ( 09 May 2020 06:38 )  Alb: 2.7 g/dL / Pro: 6.2 g/dL / ALK PHOS: 41 U/L / ALT: 24 U/L / AST: 19 U/L / GGT: x           Vitamin B12   PT/INR - ( 09 May 2020 06:38 )   PT: 16.6 sec;   INR: 1.46 ratio         PTT - ( 09 May 2020 06:38 )  PTT:30.6 sec      RADIOLOGY    ANALYSIS AND PLAN:  This is a 64-year-old with generalized paresis, episode of loss of consciousness.  1.	For generalized paresis, suspect this could be secondary to failure to thrive, poor oral intake.  I doubt that this is from a primary CNS event  2.	For episode of loss of consciousness, suspect most likely this was a syncopal event.  No clear history to suggest underlying epilepsy.  No sign on examination to suggest cerebrovascular accident has ensued.  3.	I would recommend Physical Therapy evaluation.  4.	Fall precautions.  5.	consider psych evaluation possible depression   6.	Monitor oral intake.  7.	Greater than 40 minutes of time was spent with the patient, plan of care, reviewing data, speaking to the multidisciplinary healthcare team.

## 2020-05-10 NOTE — PROGRESS NOTE ADULT - ASSESSMENT
64/M without any significant known PMHx who presented to the ED c/o worsening weakness over the past 1 month with associated chills, and syncope, admitted for r/o COVID19 and syncope workup. Also found to have depression and suicidal, found to have right leg DVT probably provoked related to immobility, has been bridged to Xarelto     Syncope  - No evidence of arrythmia on tele or ECG  - Denies dizziness or lightheadedness  - S/P IVF in ED, encourage PO hydration  - Likely related to poor PO intake  - TTE done results pending   - Carotid doppler noted    DVT  - Per primary team  - Continue w/ rivaroxaban    Renal stone  - Patient had an episode where he went to bathroom overnight, had pain with urination and noted urine to be bloody, followed by augustuslls  - Amado 2/2 renal stone  - Encourage hydration   - Recommend urology consult       - Monitor and replete lytes, keep K>4, Mg>2.  - All other medical needs as per primary team.  - Other cardiovascular workup will depend on clinical course.  - Will continue to follow.      Danielle Andino, MS FNP, [facility]  Nurse Practitioner- Cardiology   Spectra #[phone]

## 2020-05-10 NOTE — PROGRESS NOTE ADULT - SUBJECTIVE AND OBJECTIVE BOX
Interval History:  is upset and mildly agitated today over his life issues  has been bridged to xarelto  Chart reviewed and events noted;   Overnight events:    MEDICATIONS  (STANDING):  mirtazapine 15 milliGRAM(s) Oral at bedtime  pantoprazole    Tablet 40 milliGRAM(s) Oral before breakfast  PARoxetine 20 milliGRAM(s) Oral daily  polyethylene glycol 3350 17 Gram(s) Oral daily  rivaroxaban 15 milliGRAM(s) Oral two times a day with meals    MEDICATIONS  (PRN):      Vital Signs Last 24 Hrs  T(C): 36.7 (10 May 2020 04:30), Max: 36.7 (10 May 2020 04:30)  T(F): 98.1 (10 May 2020 04:30), Max: 98.1 (10 May 2020 04:30)  HR: 87 (10 May 2020 04:30) (87 - 99)  BP: 102/63 (10 May 2020 04:30) (102/63 - 107/70)  BP(mean): --  RR: 17 (10 May 2020 04:30) (17 - 18)  SpO2: 95% (10 May 2020 04:30) (92% - 95%)    PHYSICAL EXAM  General: adult in NAD  HEENT: clear oropharynx, anicteric sclera, pink conjunctivae  Neck: supple  CV: normal S1S2 with no murmur rubs or gallops  Lungs: clear to auscultation, no wheezes, no rhales  Abdomen: soft non-tender non-distended, no hepato/splenomegaly  Ext: no clubbing cyanosis or edema  Skin: no rashes and no petichiae  Neuro: alert and oriented X3 no focal deficits      LABS:        TPro  6.2  /  Alb  2.7<L>  /  TBili  1.1  /  DBili  .50<H>  /  AST  19  /  ALT  24  /  AlkPhos  41  05-09    PT/INR - ( 09 May 2020 06:38 )   PT: 16.6 sec;   INR: 1.46 ratio         PTT - ( 09 May 2020 06:38 )  PTT:30.6 sec    fe studies      WBC trend  10.48 K/uL (05-07-20 @ 20:12)      Hgb trend  14.4 g/dL (05-07-20 @ 20:12)      plt trend  140 K/uL (05-07-20 @ 20:12)        RADIOLOGY & ADDITIONAL STUDIES: Interval History:  is upset and mildly agitated today over his life issues  has been bridged to xarelto  apparantly had episode of hematuria.  scans with kidney stone noted  Chart reviewed and events noted;   Overnight events:    MEDICATIONS  (STANDING):  mirtazapine 15 milliGRAM(s) Oral at bedtime  pantoprazole    Tablet 40 milliGRAM(s) Oral before breakfast  PARoxetine 20 milliGRAM(s) Oral daily  polyethylene glycol 3350 17 Gram(s) Oral daily  rivaroxaban 15 milliGRAM(s) Oral two times a day with meals    MEDICATIONS  (PRN):      Vital Signs Last 24 Hrs  T(C): 36.7 (10 May 2020 04:30), Max: 36.7 (10 May 2020 04:30)  T(F): 98.1 (10 May 2020 04:30), Max: 98.1 (10 May 2020 04:30)  HR: 87 (10 May 2020 04:30) (87 - 99)  BP: 102/63 (10 May 2020 04:30) (102/63 - 107/70)  BP(mean): --  RR: 17 (10 May 2020 04:30) (17 - 18)  SpO2: 95% (10 May 2020 04:30) (92% - 95%)    PHYSICAL EXAM  General: adult in NAD  HEENT: clear oropharynx, anicteric sclera, pink conjunctivae  Neck: supple  CV: normal S1S2 with no murmur rubs or gallops  Lungs: clear to auscultation, no wheezes, no rhales  Abdomen: soft non-tender non-distended, no hepato/splenomegaly  Ext: no clubbing cyanosis or edema  Skin: no rashes and no petichiae  Neuro: alert and oriented X3 no focal deficits      LABS:        TPro  6.2  /  Alb  2.7<L>  /  TBili  1.1  /  DBili  .50<H>  /  AST  19  /  ALT  24  /  AlkPhos  41  05-09    PT/INR - ( 09 May 2020 06:38 )   PT: 16.6 sec;   INR: 1.46 ratio         PTT - ( 09 May 2020 06:38 )  PTT:30.6 sec    fe studies      WBC trend  10.48 K/uL (05-07-20 @ 20:12)      Hgb trend  14.4 g/dL (05-07-20 @ 20:12)      plt trend  140 K/uL (05-07-20 @ 20:12)        RADIOLOGY & ADDITIONAL STUDIES:

## 2020-05-10 NOTE — PROGRESS NOTE ADULT - PROBLEM SELECTOR PLAN 1
nc ct ap w +gs, liver/bile ducts normal appearing  now resolved, suspect in setting of prior dec po  diet as tolerated, encouragement/assistance at meals

## 2020-05-10 NOTE — PROGRESS NOTE ADULT - PROBLEM SELECTOR PLAN 3
- episode of hematuria overnight. CT abdomen significant for L renal stones and partial staghorn calculus   - Patient otherwise asymptomatic  - Noted heme/onc eval note -- currently active DVT w/ family hx suspicious of underlying hypercoag etiology, will need outpatient follow up   - will continue xarelto at present and monitor closely for hematuria  - monitor H/H  - urology consulted - episode of hematuria overnight. CT abdomen significant for L renal stones and partial staghorn calculus   - Patient otherwise asymptomatic  - Noted heme/onc eval note -- currently active DVT w/ family hx suspicious of underlying hypercoag etiology, will need outpatient follow up   - will continue xarelto at present and monitor closely for hematuria  - monitor H/H  - urology consulted - Dr. Sibley, f/u recs

## 2020-05-10 NOTE — PROGRESS NOTE ADULT - PROBLEM SELECTOR PLAN 2
Started on Lovenox - transition to xarelto  Hematology consulted, eval noted  D/w Dr. Hobbs , no indication for IVC filter and will f/u as out patient after discharge

## 2020-05-10 NOTE — PROGRESS NOTE ADULT - PROBLEM SELECTOR PLAN 7
Patient noted to have overgrown toenails of b/l feet, thick and discolored, c/w onychomycosis  -patient will likely require assistance in trimming toenails  -Podiatry consulted

## 2020-05-11 DIAGNOSIS — N20.0 CALCULUS OF KIDNEY: ICD-10-CM

## 2020-05-11 DIAGNOSIS — R31.0 GROSS HEMATURIA: ICD-10-CM

## 2020-05-11 DIAGNOSIS — N43.40 SPERMATOCELE OF EPIDIDYMIS, UNSPECIFIED: ICD-10-CM

## 2020-05-11 DIAGNOSIS — K59.00 CONSTIPATION, UNSPECIFIED: ICD-10-CM

## 2020-05-11 DIAGNOSIS — N40.0 BENIGN PROSTATIC HYPERPLASIA WITHOUT LOWER URINARY TRACT SYMPTOMS: ICD-10-CM

## 2020-05-11 LAB
ANION GAP SERPL CALC-SCNC: 9 MMOL/L — SIGNIFICANT CHANGE UP (ref 5–17)
BUN SERPL-MCNC: 15 MG/DL — SIGNIFICANT CHANGE UP (ref 7–23)
CALCIUM SERPL-MCNC: 8.3 MG/DL — LOW (ref 8.5–10.1)
CHLORIDE SERPL-SCNC: 106 MMOL/L — SIGNIFICANT CHANGE UP (ref 96–108)
CO2 SERPL-SCNC: 26 MMOL/L — SIGNIFICANT CHANGE UP (ref 22–31)
CREAT SERPL-MCNC: 1.1 MG/DL — SIGNIFICANT CHANGE UP (ref 0.5–1.3)
GLUCOSE SERPL-MCNC: 100 MG/DL — HIGH (ref 70–99)
HCT VFR BLD CALC: 39.4 % — SIGNIFICANT CHANGE UP (ref 39–50)
HGB BLD-MCNC: 13.4 G/DL — SIGNIFICANT CHANGE UP (ref 13–17)
MAGNESIUM SERPL-MCNC: 2.3 MG/DL — SIGNIFICANT CHANGE UP (ref 1.6–2.6)
MCHC RBC-ENTMCNC: 30.3 PG — SIGNIFICANT CHANGE UP (ref 27–34)
MCHC RBC-ENTMCNC: 34 GM/DL — SIGNIFICANT CHANGE UP (ref 32–36)
MCV RBC AUTO: 89.1 FL — SIGNIFICANT CHANGE UP (ref 80–100)
NRBC # BLD: 0 /100 WBCS — SIGNIFICANT CHANGE UP (ref 0–0)
PLATELET # BLD AUTO: 187 K/UL — SIGNIFICANT CHANGE UP (ref 150–400)
POTASSIUM SERPL-MCNC: 3.6 MMOL/L — SIGNIFICANT CHANGE UP (ref 3.5–5.3)
POTASSIUM SERPL-SCNC: 3.6 MMOL/L — SIGNIFICANT CHANGE UP (ref 3.5–5.3)
RBC # BLD: 4.42 M/UL — SIGNIFICANT CHANGE UP (ref 4.2–5.8)
RBC # FLD: 12.9 % — SIGNIFICANT CHANGE UP (ref 10.3–14.5)
SODIUM SERPL-SCNC: 141 MMOL/L — SIGNIFICANT CHANGE UP (ref 135–145)
WBC # BLD: 8.18 K/UL — SIGNIFICANT CHANGE UP (ref 3.8–10.5)
WBC # FLD AUTO: 8.18 K/UL — SIGNIFICANT CHANGE UP (ref 3.8–10.5)

## 2020-05-11 PROCEDURE — 99232 SBSQ HOSP IP/OBS MODERATE 35: CPT

## 2020-05-11 PROCEDURE — 74178 CT ABD&PLV WO CNTR FLWD CNTR: CPT | Mod: 26

## 2020-05-11 PROCEDURE — 99233 SBSQ HOSP IP/OBS HIGH 50: CPT

## 2020-05-11 RX ORDER — FOLIC ACID 0.8 MG
1 TABLET ORAL DAILY
Refills: 0 | Status: DISCONTINUED | OUTPATIENT
Start: 2020-05-11 | End: 2020-05-15

## 2020-05-11 RX ORDER — MIRTAZAPINE 45 MG/1
30 TABLET, ORALLY DISINTEGRATING ORAL AT BEDTIME
Refills: 0 | Status: DISCONTINUED | OUTPATIENT
Start: 2020-05-11 | End: 2020-05-15

## 2020-05-11 RX ORDER — ARIPIPRAZOLE 15 MG/1
2 TABLET ORAL DAILY
Refills: 0 | Status: DISCONTINUED | OUTPATIENT
Start: 2020-05-11 | End: 2020-05-15

## 2020-05-11 RX ORDER — SENNA PLUS 8.6 MG/1
2 TABLET ORAL AT BEDTIME
Refills: 0 | Status: DISCONTINUED | OUTPATIENT
Start: 2020-05-11 | End: 2020-05-15

## 2020-05-11 RX ORDER — POLYETHYLENE GLYCOL 3350 17 G/17G
17 POWDER, FOR SOLUTION ORAL
Refills: 0 | Status: DISCONTINUED | OUTPATIENT
Start: 2020-05-11 | End: 2020-05-15

## 2020-05-11 RX ADMIN — RIVAROXABAN 15 MILLIGRAM(S): KIT at 08:39

## 2020-05-11 RX ADMIN — Medication 30 MILLIGRAM(S): at 17:38

## 2020-05-11 RX ADMIN — Medication 20 MILLIGRAM(S): at 12:04

## 2020-05-11 RX ADMIN — POLYETHYLENE GLYCOL 3350 17 GRAM(S): 17 POWDER, FOR SOLUTION ORAL at 17:39

## 2020-05-11 RX ADMIN — RIVAROXABAN 15 MILLIGRAM(S): KIT at 17:37

## 2020-05-11 RX ADMIN — PANTOPRAZOLE SODIUM 40 MILLIGRAM(S): 20 TABLET, DELAYED RELEASE ORAL at 05:02

## 2020-05-11 NOTE — PROGRESS NOTE ADULT - ASSESSMENT
64/M without any significant known PMHx who presented to the ED c/o worsening weakness over the past 1 month with associated chills, and syncope, admitted for r/o COVID19 and syncope workup. Also found to have depression and suicidal, found to have right leg DVT probably provoked related to immobility, has been bridged to Xarelto     Syncope  - No evidence of arrythmia on tele or ECG  - Denies dizziness or lightheadedness at present   - Encourage PO hydration  - Likely related to poor PO intake  - TTE was difficult and incomplete study with normal LV function, unable to evaluate valvular pathology   - Carotid doppler noted    DVT  - Per primary team  - Continue w/ rivaroxaban    Renal stone  - Patient had an episode where he went to bathroom overnight, had pain with urination and noted urine to be bloody, followed by [facility]  - Amado 2/2 renal stone  - Encourage hydration   - Urology consult pending     Depression   - Continue Remeron and Paxil      - Monitor and replete lytes, keep K>4, Mg>2.  - All other medical needs as per primary team.  - Other cardiovascular workup will depend on clinical course.  - Will continue to follow.      Danielle Andino, MS FNP, [facility]  Nurse Practitioner- Cardiology   Spectra #[phone] 64/M without any significant known PMHx who presented to the ED c/o worsening weakness over the past 1 month with associated chills, and syncope, admitted for r/o COVID19 and syncope workup. Also found to have depression and suicidal, found to have right leg DVT probably provoked related to immobility, has been bridged to Xarelto     Syncope  - No evidence of arrythmia on tele or ECG. Denies dizziness or lightheadedness at present. Unlikely cardiac etiology   - Encourage PO hydration. Likely related to poor PO intake  - TTE was difficult and incomplete study with normal LV function, unable to evaluate valvular pathology   - Carotid doppler noted    DVT  - Per primary team  - Continue w/ rivaroxaban    Renal stone  - Patient had an episode where he went to bathroom overnight, had pain with urination and noted urine to be bloody, followed by augustuslls  - Amado 2/2 renal stone  - Encourage hydration   - Urology consult pending     Depression   - Continue Remeron and Paxil    - Monitor and replete lytes, keep K>4, Mg>2.  - All other medical needs as per primary team.  - Other cardiovascular workup will depend on clinical course.  - Will continue to follow.      Danielle Andino, MS FNP, [facility]  Nurse Practitioner- Cardiology   Spectra #[phone]

## 2020-05-11 NOTE — DISCHARGE NOTE PROVIDER - INSTRUCTIONS
Solids with thin liquids. (+) aspiration precautions Solids with thin liquids. (+) aspiration precautions  Ensure TID with meals

## 2020-05-11 NOTE — PROGRESS NOTE ADULT - PROBLEM SELECTOR PLAN 1
Patient presenting with worsening weakness, fatigue, over the past 1 month with 2 admitted falls in his home approx 2 weeks prior to admission with associated LOC and preceding dizziness.   -Patient attributes to Poor PO intake, feels like he does not have significant appetite   - encourage PO hydration  -CT head negative for ICH or gross structural abnormality, CT cspine without acute fracture  -Patient without any outpatient followup ~ 30 years, likely with some chronic unaddressed issues  - Carotid doppler negative  -TTE result pending   - Neuro, cardio consults appreciated Suspect due to dehydration and poor oral intake due to depression. Patient presenting with worsening weakness, fatigue, over the past 1 month with 2 admitted falls in his home approx 2 weeks prior to admission with associated LOC and preceding dizziness.   -Patient attributes to Poor PO intake, feels like he does not have significant appetite   - encourage PO hydration  -CT head negative for ICH or gross structural abnormality, CT cspine without acute fracture  -Patient without any outpatient followup ~ 30 years, likely with some chronic unaddressed issues  - Carotid doppler negative  -TTE result pending   - Neuro, cardio consults appreciated

## 2020-05-11 NOTE — PROGRESS NOTE ADULT - PROBLEM SELECTOR PLAN 3
- episode of hematuria overnight. CT abdomen significant for L renal stones and partial staghorn calculus   - Patient otherwise asymptomatic  - Noted heme/onc eval note -- currently active DVT w/ family hx suspicious of underlying hypercoag etiology, will need outpatient follow up   - will continue xarelto at present and monitor closely for hematuria  - monitor H/H  - urology consulted - Dr. Sibley, recs appreciated,  -CT pre and post contrast ordered, will need outpatient cysto  - US testicle ordered to r/o scrotal mass   Will eventually need referal to tertiary center as o/p for percutaneous nephrolithotomy. - episode of hematuria overnight. CT abdomen significant for L renal stones and partial staghorn calculus   - Patient otherwise asymptomatic  - Noted heme/onc eval note -- currently active DVT w/ family hx suspicious of underlying hypercoag etiology, will need outpatient follow up   - will continue xarelto at present and monitor closely for hematuria  - monitor H/H  - urology consulted - Dr. Sibley, recs appreciated,  -CT pre and post contrast ordered, will need outpatient cysto  - US testicle ordered to r/o scrotal mass   Will eventually need referal to tertiary center as o/p for percutaneous nephrolithotomy.  D/w Dr. Montgomery , Ok to continue Xarelto

## 2020-05-11 NOTE — PROGRESS NOTE ADULT - SUBJECTIVE AND OBJECTIVE BOX
[facility] -- Lizzy Gonsales, Rosibel, Tara, Ayo Blanco, Aron Alexander: Office # [phone]    Follow Up:  Near syncope     Subjective/Observations: Patient seen and examined. Patient awake and alert, resting comfortably in bed. No complaints of chest pain, SOB, LE edema, cough. No signs of orthopnea or PND. Patient continues to feels depressed. Admits he does not feel like eating. Tolerating room air.     REVIEW OF SYSTEMS: All review of systems is negative for eye, ENT, GI, , allergic, dermatologic, musculoskeletal and neurologic except as described above    PAST MEDICAL & SURGICAL HISTORY:  No pertinent past medical history  History of vocal cord polypectomy  Benign parotid tumor: 1983  History of appendectomy: 1965    MEDICATIONS  (STANDING):  mirtazapine 15 milliGRAM(s) Oral at bedtime  pantoprazole    Tablet 40 milliGRAM(s) Oral before breakfast  PARoxetine 20 milliGRAM(s) Oral daily  polyethylene glycol 3350 17 Gram(s) Oral two times a day  rivaroxaban 15 milliGRAM(s) Oral two times a day with meals  senna 2 Tablet(s) Oral at bedtime    MEDICATIONS  (PRN):    Allergies  No Known Allergies    Vital Signs Last 24 Hrs  T(C): 36.9 (11 May 2020 04:22), Max: 36.9 (11 May 2020 04:22)  T(F): 98.4 (11 May 2020 04:22), Max: 98.4 (11 May 2020 04:22)  HR: 90 (11 May 2020 04:22) (88 - 90)  BP: 112/74 (11 May 2020 04:22) (100/68 - 112/74)  BP(mean): --  RR: 17 (11 May 2020 04:22) (17 - 18)  SpO2: 93% (11 May 2020 04:22) (91% - 96%)    I&O's Summary    10 May 2020 07:01  -  11 May 2020 07:00  --------------------------------------------------------  IN: 0 mL / OUT: 300 mL / NET: -300 mL    TELE: Not on telemetry   PHYSICAL EXAM:  Appearance: NAD, no distress, alert, Well developed   HEENT: Moist Mucous Membranes, Anicteric  Cardiovascular: Regular rate and rhythm, Normal S1 S2, No JVD, No murmurs, No rubs, gallops or clicks  Respiratory: Non-labored, Clear to auscultation, No rales, No rhonchi, No wheezing.   Gastrointestinal:  Soft, Non-tender, + BS  Neurologic: Non-focal  Skin: Warm and dry, No visible rashes or ulcers, No ecchymosis, No cyanosis  Musculoskeletal: No clubbing, No cyanosis, No joint swelling/tenderness  Psychiatry: Mood & affect appropriate  Lymph: No peripheral edema.     LABS: All Labs Reviewed:                        13.4   8.18  )-----------( 187      ( 11 May 2020 07:10 )             39.4                         12.8   8.67  )-----------( 155      ( 10 May 2020 11:02 )             36.4     11 May 2020 07:10    141    |  106    |  15     ----------------------------<  100    3.6     |  26     |  1.10   10 May 2020 11:02    140    |  107    |  14     ----------------------------<  109    3.1     |  26     |  1.10     Ca    8.3        11 May 2020 07:10  Ca    8.0        10 May 2020 11:02  Mg     2.3       11 May 2020 07:10    TPro  5.9    /  Alb  2.6    /  TBili  1.1    /  DBili  x      /  AST  18     /  ALT  18     /  AlkPhos  36     10 May 2020 11:02  TPro  6.2    /  Alb  2.7    /  TBili  1.1    /  DBili  .50    /  AST  19     /  ALT  24     /  AlkPhos  41     09 May 2020 06:38     12 Lead ECG:   Ventricular Rate 113 BPM  Atrial Rate 113 BPM  P-R Interval 130 ms  QRS Duration 72 ms  Q-T Interval 368 ms  QTC Calculation(Bezet) 504 ms  P Axis -1 degrees  R Axis 0 degrees  T Axis 0 degrees  Diagnosis Line Sinus tachycardia  Junctional ST depression, probably normal  Borderline ECG  No previous ECGs available  Confirmed by Aneudy Gleason MD ([id number]) on 5/8/2020 1:36:50 PM (05-07-20 @ 17:29)    < from: TTE Echo Complete w/o contrast w/ Doppler (05.10.20 @ 09:21) >  Dimensions:    LA 3.3       Normal Values: 2.0 - 4.0 cm    Ao 3.8        Normal Values: 2.0 - 3.8 cm  SEPTUM Normal Values: 0.6 - 1.2 cm  PWT Normal Values: 0.6 - 1.1 cm  LVIDd Normal Values: 3.0 - 5.6 cm  LVIDs Normal Values: 1.8 - 4.0 cm      OBSERVATIONS:  Technically difficult and incomplete study, unable to obtain most views  Mitral Valve: Grossly normal  Aortic Valve/Aorta: Not well-visualized  Tricuspid Valve: Not visualized.  Pulmonic Valve: Not visualized  Left Atrium: Grossly normal  Right Atrium: Not visualized  Left Ventricle: Grossly normal left ventricular function. Endocardium is not well-visualized, cannot rule out segmental dysfunction  Right Ventricle: Not visualized      Conclusion:   Technically difficult and incomplete study  Grossly normal left ventricular function  Right ventricle is not visualized  Unable to comment on valvular structure or pathology        < end of copied text >      < from: US Duplex Carotid Arteries Complete, Bilateral (05.08.20 @ 09:50) >  Interpretation: Mild plaque formation is noted at both internal carotid arteries.  Blood flow velocities (cm/sec): (Normal is less than 125)  Right: Proximal CCA=67  Distal CCA=76  Prox ICA=35 Distal ICA=49  ECA=50  ICA/CCA ratio=0.7 (Normal= less than 2)  Left: Proximal CCA=102    Distal CCA=70  Prox ICA=38  Distal ICA=50      ECA=78  ICA/CCA ratio=0.7 (Normal= less than 2)    The right vertebral artery shows antegrade flow. The left vertebral artery shows antegrade flow.  IMPRESSION:  All peak systolic and end diastolic velocities are within normal limits. No sonographic evidence of hemodynamically significant stenosis.    < end of copied text >    < from: CT Chest No Cont (05.07.20 @ 20:34) >  FINDINGS:  CHEST:   LUNGS AND LARGE AIRWAYS: Patent central airways. No pulmonary nodules. Mild bilateral lower lobe subsegmental atelectasis.  PLEURA: No pleural effusion.  VESSELS: Normal caliber aorta. Mild coronary artery calcification.  HEART: Heart size is normal. No pericardial effusion.  MEDIASTINUM AND SELVIN: No lymphadenopathy.  CHEST WALL AND LOWER NECK: Within normal limits.    ABDOMEN AND PELVIS:  LIVER: Within normal limits.  BILE DUCTS: Normal caliber.  GALLBLADDER: Multiple gallstones.  SPLEEN: Within normal limits.  PANCREAS: Within normal limits.  ADRENALS: Within normal limits.  KIDNEYS/URETERS: 1.9 cm nonobstructing left upper pole renal stone. Partial staghorn left renal calculus with multiple small stones versus fragmented larger stone in the left renal pelvis. No hydronephrosis. 2.7 cm hypodense lesion in the posterior aspect of the left kidney which is unable to be characterized on this unenhanced CT scan.  BLADDER: Within normal limits.  REPRODUCTIVE ORGANS: Prostate within normal limits.  BOWEL: No bowel obstruction. Appendix not identified. Mild colonic diverticulosis.  PERITONEUM: No ascites or hemoperitoneum.  VESSELS: Mild atherosclerotic arterial calcifications.  RETROPERITONEUM/LYMPH NODES: No lymphadenopathy.    ABDOMINAL WALL: Within normal limits.  BONES: No fractures.    IMPRESSION:   No traumatic injury.  Extensive nonobstructing left renal stones, including a partial staghorn calculus.    < end of copied text >    < from: CT Head No Cont (05.07.20 @ 20:32) >  IMPRESSION:    No acute intracranial hemorrhage or acute territorial infarct.     < end of copied text >

## 2020-05-11 NOTE — PROGRESS NOTE ADULT - SUBJECTIVE AND OBJECTIVE BOX
Neurology follow up note    GARDENIA [id number]      Interval History:    Patient feels tired and sad     MEDICATIONS    ARIPiprazole 2 milliGRAM(s) Oral daily  folic acid 1 milliGRAM(s) Oral daily  mirtazapine 30 milliGRAM(s) Oral at bedtime  pantoprazole    Tablet 40 milliGRAM(s) Oral before breakfast  PARoxetine 30 milliGRAM(s) Oral daily  polyethylene glycol 3350 17 Gram(s) Oral two times a day  rivaroxaban 15 milliGRAM(s) Oral two times a day with meals  senna 2 Tablet(s) Oral at bedtime      Allergies    No Known Allergies    Intolerances            Vital Signs Last 24 Hrs  T(C): 36.4 (11 May 2020 12:29), Max: 36.9 (11 May 2020 04:22)  T(F): 97.5 (11 May 2020 12:29), Max: 98.4 (11 May 2020 04:22)  HR: 92 (11 May 2020 12:29) (90 - 92)  BP: 107/71 (11 May 2020 12:29) (103/66 - 112/74)  BP(mean): --  RR: 16 (11 May 2020 12:29) (16 - 18)  SpO2: 98% (11 May 2020 12:29) (91% - 98%)      REVIEW OF SYSTEMS:  Constitutional:  The patient denies fever, chills, or night sweats at present.  Head:  No headaches.  Eyes:  No double vision or blurry vision.  Ears:  No ringing in the ears.  Neck:  No neck pain.  Respiratory:  No shortness of breath.  Cardiovascular:  No chest pain.  Abdomen:  No nausea, vomiting, or abdominal pain.  Extremities/Neurological:  No numbness or tingling.  Musculoskeletal:  No joint pain.  General:  Positive history for the last one month of lethargy, generalized weakness, fatigue, episode of loss of consciousness.    PHYSICAL EXAMINATION:  HEENT:  Head:  Normocephalic, atraumatic.  Eyes:  No scleral icterus.  Ears:  Hearing bilaterally intact.  NECK:  Supple.  RESPIRATORY:  Good air entry bilaterally.  CARDIOVASCULAR:  S1 and S2 heard.  ABDOMEN:  Soft and nontender.  EXTREMITIES:  No clubbing or cyanosis were noted.      NEUROLOGIC:  The patient is awake and alert.  Extraocular movements were intact.  Pupils were equal, round, and reactive bilaterally 3 mm to 2 mm.  Speech was fluent.  Smile was symmetric.  Motor:  Bilateral upper and lower were 5/5.  Sensory:  Bilateral upper and lower intact to light touch.              LABS:  CBC Full  -  ( 11 May 2020 07:10 )  WBC Count : 8.18 K/uL  RBC Count : 4.42 M/uL  Hemoglobin : 13.4 g/dL  Hematocrit : 39.4 %  Platelet Count - Automated : 187 K/uL  Mean Cell Volume : 89.1 fl  Mean Cell Hemoglobin : 30.3 pg  Mean Cell Hemoglobin Concentration : 34.0 gm/dL  Auto Neutrophil # : x  Auto Lymphocyte # : x  Auto Monocyte # : x  Auto Eosinophil # : x  Auto Basophil # : x  Auto Neutrophil % : x  Auto Lymphocyte % : x  Auto Monocyte % : x  Auto Eosinophil % : x  Auto Basophil % : x      05-11    141  |  106  |  15  ----------------------------<  100<H>  3.6   |  26  |  1.10    Ca    8.3<L>      11 May 2020 07:10  Mg     2.3     05-11    TPro  5.9<L>  /  Alb  2.6<L>  /  TBili  1.1  /  DBili  x   /  AST  18  /  ALT  18  /  AlkPhos  36<L>  05-10    Hemoglobin A1C:     LIVER FUNCTIONS - ( 10 May 2020 11:02 )  Alb: 2.6 g/dL / Pro: 5.9 g/dL / ALK PHOS: 36 U/L / ALT: 18 U/L / AST: 18 U/L / GGT: x           Vitamin B12         RADIOLOGY    ANALYSIS AND PLAN:  This is a 64-year-old with generalized paresis, episode of loss of consciousness.  1.	For generalized paresis, suspect this could be secondary to failure to thrive, poor oral intake.  I doubt that this is from a primary CNS event  2.	For episode of loss of consciousness, suspect most likely this was a syncopal event.  No clear history to suggest underlying epilepsy.  No sign on examination to suggest cerebrovascular accident has ensued.  3.	I would recommend Physical Therapy evaluation.  4.	Fall precautions.  5.	consider psych evaluation possible depression   6.	Monitor oral intake.  7.	Greater than 33 minutes of time was spent with the patient, plan of care, reviewing data, speaking to the multidisciplinary healthcare team.

## 2020-05-11 NOTE — PROGRESS NOTE ADULT - PROBLEM SELECTOR PLAN 2
Started on Lovenox - transition to xarelto  Hematology consulted, eval noted  D/w Dr. Hobbs , no indication for IVC filter and will f/u as out patient after discharge for hypercoagulable w/u

## 2020-05-11 NOTE — PROGRESS NOTE ADULT - PROBLEM SELECTOR PLAN 4
Patient with worsening weakness x 1 month, likely multifactorial etiology - psych, poor PO intake, deconditioning from lack of activity  -Encourage PO intake  -At baseline patient able to ambulate without assistive devices, states he is "active"  -Nutrition consult, PT consult

## 2020-05-11 NOTE — PROGRESS NOTE ADULT - SUBJECTIVE AND OBJECTIVE BOX
INTERVAL HPI/OVERNIGHT EVENTS:  pt seen and examined  c/o hematuria  denies n/v/abd pain  no bm    MEDICATIONS  (STANDING):  mirtazapine 15 milliGRAM(s) Oral at bedtime  pantoprazole    Tablet 40 milliGRAM(s) Oral before breakfast  PARoxetine 20 milliGRAM(s) Oral daily  polyethylene glycol 3350 17 Gram(s) Oral daily  rivaroxaban 15 milliGRAM(s) Oral two times a day with meals    MEDICATIONS  (PRN):      Allergies    No Known Allergies    Intolerances        Review of Systems:    General:  No wt loss, fevers, chills, night sweats, fatigue   Eyes:  Good vision, no reported pain  ENT:  No sore throat, pain, runny nose, dysphagia  CV:  No pain, palpitations, hypo/hypertension  Resp:  No dyspnea, cough, tachypnea, wheezing  GI:  No pain, No nausea, No vomiting, No diarrhea, No constipation, No weight loss, No fever, No pruritis, No rectal bleeding, No melena, No dysphagia  :  hematuria   Muscle:  No pain, weakness  Neuro:  No weakness, tingling, memory problems  Psych:  No fatigue, insomnia, mood problems, depression  Endocrine:  No polyuria, polydypsia, cold/heat intolerance  Heme:  No petechiae, ecchymosis, easy bruisability  Skin:  No rash, tattoos, scars, edema      Vital Signs Last 24 Hrs  T(C): 36.9 (11 May 2020 04:22), Max: 36.9 (11 May 2020 04:22)  T(F): 98.4 (11 May 2020 04:22), Max: 98.4 (11 May 2020 04:22)  HR: 90 (11 May 2020 04:22) (88 - 90)  BP: 112/74 (11 May 2020 04:22) (100/68 - 112/74)  BP(mean): --  RR: 17 (11 May 2020 04:22) (17 - 18)  SpO2: 93% (11 May 2020 04:22) (91% - 96%)    PHYSICAL EXAM:    General:  nad  HEENT:  NC/AT  Abdomen:  Soft, non-tender, +dt  Extremities:  no edema  Neuro/Psych:  A&Ox3        LABS:                        13.4   8.18  )-----------( 187      ( 11 May 2020 07:10 )             39.4     05-11    141  |  106  |  15  ----------------------------<  100<H>  3.6   |  26  |  1.10    Ca    8.3<L>      11 May 2020 07:10  Mg     2.3     05-11    TPro  5.9<L>  /  Alb  2.6<L>  /  TBili  1.1  /  DBili  x   /  AST  18  /  ALT  18  /  AlkPhos  36<L>  05-10          RADIOLOGY & ADDITIONAL TESTS:

## 2020-05-11 NOTE — PROGRESS NOTE BEHAVIORAL HEALTH - NSBHCHARTREVIEWVS_PSY_A_CORE FT
Vital Signs Last 24 Hrs  T(C): 36.4 (11 May 2020 12:29), Max: 36.9 (11 May 2020 04:22)  T(F): 97.5 (11 May 2020 12:29), Max: 98.4 (11 May 2020 04:22)  HR: 92 (11 May 2020 12:29) (88 - 92)  BP: 107/71 (11 May 2020 12:29) (100/68 - 112/74)  BP(mean): --  RR: 16 (11 May 2020 12:29) (16 - 18)  SpO2: 98% (11 May 2020 12:29) (91% - 98%)

## 2020-05-11 NOTE — DISCHARGE NOTE PROVIDER - NSDCCPCAREPLAN_GEN_ALL_CORE_FT
PRINCIPAL DISCHARGE DIAGNOSIS  Diagnosis: Syncope and collapse  Assessment and Plan of Treatment:       SECONDARY DISCHARGE DIAGNOSES  Diagnosis: Suicidal ideation  Assessment and Plan of Treatment:     Diagnosis: Weakness  Assessment and Plan of Treatment:     Diagnosis: Diarrhea, unspecified type  Assessment and Plan of Treatment: PRINCIPAL DISCHARGE DIAGNOSIS  Diagnosis: Syncope and collapse  Assessment and Plan of Treatment: You were admitted following a syncopal episode. CT head was negative. You were seen by cardio and nerology while admitted. You showed gradual improvement over hospitalization and no further symptoms      SECONDARY DISCHARGE DIAGNOSES  Diagnosis: Deep vein thrombosis (DVT) of popliteal vein of right lower extremity, unspecified chronicity  Assessment and Plan of Treatment: You were found to have a DVT on imaging and started on Xarelto. Please follow up with Dr. Hobbs (hematologist) outpatient for hypercoagulable workup to help identify potential cause of the blood clot.    Diagnosis: Renal stones  Assessment and Plan of Treatment: You were found to have kidney stones on imaging an were evaluated by a urologist while admitted. You will need to follow up with Dr. Sibley outpatient for further workup and management    Diagnosis: Adjustment disorder with depressed mood  Assessment and Plan of Treatment: You were evaluated by a psychiatrist while admitted and started on medication to help with depression and your appetite to are being discharged to an inpatient facility for continued monitoring and management PRINCIPAL DISCHARGE DIAGNOSIS  Diagnosis: Syncope and collapse  Assessment and Plan of Treatment: You were admitted following a syncopal episode. CT head was negative. You were seen by cardio and nerology while admitted. You showed gradual improvement over hospitalization and no further symptoms      SECONDARY DISCHARGE DIAGNOSES  Diagnosis: Deep vein thrombosis (DVT) of popliteal vein of right lower extremity, unspecified chronicity  Assessment and Plan of Treatment: You were found to have a DVT on imaging and started on Xarelto. Please follow up with Dr. Hobbs (hematologist) outpatient for hypercoagulable workup to help identify potential cause of the blood clot.    Diagnosis: Renal stones  Assessment and Plan of Treatment: You were found to have have kidney stones and a renal mass on imaging. You were evaluated by a urologist while admitted and recommended to follow up with Dr. Terrance Mo at [facility] [phone] within 4-6 weeks of discharge.    Diagnosis: Adjustment disorder with depressed mood  Assessment and Plan of Treatment: You were evaluated by a psychiatrist while admitted and started on medication to help with depression and your appetite to are being discharged to an inpatient facility for continued monitoring and management PRINCIPAL DISCHARGE DIAGNOSIS  Diagnosis: Syncope and collapse  Assessment and Plan of Treatment: You were admitted following a syncopal episode. CT head was negative. You were seen by cardio and nerology while admitted. You showed gradual improvement over hospitalization and no further symptoms      SECONDARY DISCHARGE DIAGNOSES  Diagnosis: Adjustment disorder with depressed mood  Assessment and Plan of Treatment: You were evaluated by a psychiatrist while admitted and started on medication to help with depression and your appetite to are being discharged to an inpatient facility for continued monitoring and management    Diagnosis: Deep vein thrombosis (DVT) of popliteal vein of right lower extremity, unspecified chronicity  Assessment and Plan of Treatment: You were found to have a DVT on imaging and started on Xarelto. Please follow up with Dr. Hobbs (hematologist) outpatient for hypercoagulable workup to help identify potential cause of the blood clot.    Diagnosis: Renal stones  Assessment and Plan of Treatment: You were found to have have kidney stones and a renal mass on imaging. You were evaluated by a urologist while admitted, Renal mass, left.  Plan: 3 cm renal mass suspicious for neoplasm.  Patient is aware of the likelihood that this finding represents a malignancy that requires surgical intervention as outpatient in a tertiary center with cystoscopy workup. He was advised to follow up with Dr. Terrance Mo at [facility] [phone] within 4-6 weeks of discharge.

## 2020-05-11 NOTE — DISCHARGE NOTE PROVIDER - NSDCMRMEDTOKEN_GEN_ALL_CORE_FT
ARIPiprazole 2 mg oral tablet: 1 tab(s) orally once a day  folic acid 1 mg oral tablet: 1 tab(s) orally once a day  mirtazapine 30 mg oral tablet: 1 tab(s) orally once a day (at bedtime)  pantoprazole 40 mg oral delayed release tablet: 1 tab(s) orally once a day (before a meal)  PARoxetine 30 mg oral tablet: 1 tab(s) orally once a day  polyethylene glycol 3350 oral powder for reconstitution: 17 gram(s) orally 2 times a day  rivaroxaban 15 mg oral tablet: 1 tab(s) orally 2 times a day (with meals)  senna oral tablet: 2 tab(s) orally once a day (at bedtime)

## 2020-05-11 NOTE — CONSULT NOTE ADULT - PROBLEM SELECTOR PROBLEM 1
Gross hematuria
Hyperbilirubinemia
Deep vein thrombosis (DVT) of popliteal vein of right lower extremity, unspecified chronicity
Onychomycosis

## 2020-05-11 NOTE — PROGRESS NOTE ADULT - PROBLEM SELECTOR PLAN 5
Psych consult appreciated , started on Remeron and Paxil Psych follow up appreciated  Meds adjusted: Added Abilify. Remeron and Paxil  Will monitor for improvement, Dr. Conley to decide about inpatient psych admission  Denies suicidal ideation

## 2020-05-11 NOTE — PROGRESS NOTE ADULT - SUBJECTIVE AND OBJECTIVE BOX
This progress note was completed in part by resident acting as telephonic scribe during COVID-19 crisis. Physical exam and review of systems was completed by attending physician, and findings below are those of the attending physician, and have been reviewed in detail.    Patient is a 64y old  Male who presents with a chief complaint of Weakness (11 May 2020 11:59)    INTERVAL HPI/OVERNIGHT EVENTS: Patient seen and examined at bedside. No overnight events     T(C): 36.4 (05-11-20 @ 12:29), Max: 36.9 (05-11-20 @ 04:22)  HR: 92 (05-11-20 @ 12:29) (88 - 92)  BP: 107/71 (05-11-20 @ 12:29) (100/68 - 112/74)  RR: 16 (05-11-20 @ 12:29) (16 - 18)  SpO2: 98% (05-11-20 @ 12:29) (91% - 98%)  Wt(kg): --  I&O's Summary    10 May 2020 07:01  -  11 May 2020 07:00  --------------------------------------------------------  IN: 0 mL / OUT: 300 mL / NET: -300 mL      REVIEW OF SYSTEMS:  CONSTITUTIONAL: No fever,  or fatigue  EYES: No eye pain, visual disturbances, or discharge  RESPIRATORY: No cough, wheezing, chills or hemoptysis; No shortness of breath  CARDIOVASCULAR: No chest pain, palpitations, dizziness, or leg swelling  GASTROINTESTINAL: No abdominal or epigastric pain. No nausea, vomiting, or hematemesis  GENITOURINARY: Episode of hematuria overnight. No dysuria, frequency, or incontinence  NEUROLOGICAL: No headaches, memory loss, loss of strength, numbness, or tremors  SKIN: No itching, burning, rashes, or lesions   ENDOCRINE: No heat or cold intolerance; No hair loss; No polydipsia or polyuria  MUSCULOSKELETAL: No joint pain or swelling; No muscle, back, or extremity pain    PHYSICAL EXAM:  GENERAL: NAD,  well-developed  HEAD:  Atraumatic, Normocephalic  EYES: EOMI, PERRLA, conjunctiva and sclera clear  ENMT: No tonsillar erythema, exudates, or enlargement; Moist mucous membranes, Good dentition, No lesions  NECK: Supple, No JVD, Normal thyroid  NERVOUS SYSTEM:  Alert & Oriented X3, Good concentration; Motor Strength 5/5 B/L upper and lower extremities  CHEST/LUNG: Clear to auscultation bilaterally; No rales, rhonchi, wheezing, or rubs  HEART: Regular rate and rhythm; No murmurs, rubs, or gallops  ABDOMEN: Soft, Nontender, Nondistended; Bowel sounds present  EXTREMITIES:  2+ Peripheral Pulses, No clubbing, cyanosis, or edema  LYMPH: No lymphadenopathy noted  SKIN: No rashes or lesions      MEDICATIONS  (STANDING):  ARIPiprazole 2 milliGRAM(s) Oral daily  mirtazapine 30 milliGRAM(s) Oral at bedtime  pantoprazole    Tablet 40 milliGRAM(s) Oral before breakfast  PARoxetine 30 milliGRAM(s) Oral daily  polyethylene glycol 3350 17 Gram(s) Oral two times a day  rivaroxaban 15 milliGRAM(s) Oral two times a day with meals  senna 2 Tablet(s) Oral at bedtime    MEDICATIONS  (PRN):      LABS:                        13.4   8.18  )-----------( 187      ( 11 May 2020 07:10 )             39.4     05-11    141  |  106  |  15  ----------------------------<  100<H>  3.6   |  26  |  1.10    Ca    8.3<L>      11 May 2020 07:10  Mg     2.3     05-11    TPro  5.9<L>  /  Alb  2.6<L>  /  TBili  1.1  /  DBili  x   /  AST  18  /  ALT  18  /  AlkPhos  36<L>  05-10        CAPILLARY BLOOD GLUCOSE          05-08 @ 09:09   No growth  --  --  05-07 @ 22:20   No growth to date.  --  --          RADIOLOGY & ADDITIONAL TESTS: No new tests This progress note was completed in part by resident acting as telephonic scribe during COVID-19 crisis. Physical exam and review of systems was completed by attending physician, and findings below are those of the attending physician, and have been reviewed in detail.    Patient is a 64y old  Male who presents with a chief complaint of Weakness (11 May 2020 11:59)    INTERVAL HPI/OVERNIGHT EVENTS: Patient seen and examined at bedside. No overnight events. C/o weakness, still has some hematuria. Denies chest pain, palpitation, sob. Does note feel goog, can not explain  why      T(C): 36.4 (05-11-20 @ 12:29), Max: 36.9 (05-11-20 @ 04:22)  HR: 92 (05-11-20 @ 12:29) (88 - 92)  BP: 107/71 (05-11-20 @ 12:29) (100/68 - 112/74)  RR: 16 (05-11-20 @ 12:29) (16 - 18)  SpO2: 98% (05-11-20 @ 12:29) (91% - 98%)  Wt(kg): --  I&O's Summary    10 May 2020 07:01  -  11 May 2020 07:00  --------------------------------------------------------  IN: 0 mL / OUT: 300 mL / NET: -300 mL      REVIEW OF SYSTEMS:  CONSTITUTIONAL: No fever, + Gen weakness   EYES: No eye pain, visual disturbances, or discharge  RESPIRATORY: No cough, wheezing, chills or hemoptysis; No shortness of breath  CARDIOVASCULAR: No chest pain, palpitations, dizziness, or leg swelling  GASTROINTESTINAL: No abdominal or epigastric pain. No nausea, vomiting, or hematemesis  GENITOURINARY: Episode of hematuria overnight. No dysuria, frequency, or incontinence  NEUROLOGICAL: No headaches, memory loss, loss of strength, numbness, or tremors  SKIN: No itching, burning, rashes, or lesions   ENDOCRINE: No heat or cold intolerance; No hair loss; No polydipsia or polyuria  MUSCULOSKELETAL: No joint pain or swelling; No muscle, back, or extremity pain    PHYSICAL EXAM:  GENERAL: NAD,  well-developed  HEAD:  Atraumatic, Normocephalic  EYES: EOMI, PERRLA, conjunctiva and sclera clear  ENMT: No tonsillar erythema, exudates, or enlargement; Moist mucous membranes, Good dentition, No lesions  NECK: Supple, No JVD, Normal thyroid  NERVOUS SYSTEM:  Alert & Oriented X3, Good concentration; Motor Strength 5/5 B/L upper and lower extremities  CHEST/LUNG: Clear to auscultation bilaterally; No rales, rhonchi, wheezing, or rubs  HEART: Regular rate and rhythm; No murmurs, rubs, or gallops  ABDOMEN: Soft, Nontender, Nondistended; Bowel sounds present  EXTREMITIES:  2+ Peripheral Pulses, No clubbing, cyanosis, or edema  LYMPH: No lymphadenopathy noted  SKIN: No rashes or lesions      MEDICATIONS  (STANDING):  ARIPiprazole 2 milliGRAM(s) Oral daily  mirtazapine 30 milliGRAM(s) Oral at bedtime  pantoprazole    Tablet 40 milliGRAM(s) Oral before breakfast  PARoxetine 30 milliGRAM(s) Oral daily  polyethylene glycol 3350 17 Gram(s) Oral two times a day  rivaroxaban 15 milliGRAM(s) Oral two times a day with meals  senna 2 Tablet(s) Oral at bedtime    MEDICATIONS  (PRN):      LABS:                        13.4   8.18  )-----------( 187      ( 11 May 2020 07:10 )             39.4     05-11    141  |  106  |  15  ----------------------------<  100<H>  3.6   |  26  |  1.10    Ca    8.3<L>      11 May 2020 07:10  Mg     2.3     05-11    TPro  5.9<L>  /  Alb  2.6<L>  /  TBili  1.1  /  DBili  x   /  AST  18  /  ALT  18  /  AlkPhos  36<L>  05-10        CAPILLARY BLOOD GLUCOSE          05-08 @ 09:09   No growth  --  --  05-07 @ 22:20   No growth to date.  --  --          RADIOLOGY & ADDITIONAL TESTS: No new tests
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
## 2020-05-11 NOTE — PROGRESS NOTE ADULT - PROBLEM SELECTOR PLAN 8
Patient found to have plantar wart on the plantar surface of the right 3rd toe  -Likely chronic. No other obvious verrucous lesions appreciated  -Podiatry consulted Patient found to have plantar wart on the plantar surface of the right 3rd toe  -Likely chronic. No other obvious verrucous lesions appreciated  -Podiatry reccs appreciated

## 2020-05-11 NOTE — PROGRESS NOTE ADULT - PROBLEM SELECTOR PLAN 9
- on xarelto for tx of dvt   -GI ppx with PPI   -Social work consult - living situation, possible need for medicare

## 2020-05-11 NOTE — DISCHARGE NOTE PROVIDER - HOSPITAL COURSE
FROM ADMISSION H+P:     HPI:    Patient is a 64/M without any significant known PMHx who presented to the ED c/o worsening weakness over the past 1 month with associated chills that he reports began on 3/30/20. Since that time he reports progressive weakness, fatigue, laying in bed for the majority of the day, difficulty with ambulation, and poor PO intake as he reports he no longer felt he had the desire to eat. He denies any associated SOB, cough, PARDO, but reports difficulty with ambulation as he feels his legs are weak. He reports a fall approx 2 weeks ago which occurred in his apartment which was preceded by dizziness which was described as a feeling of unsteadiness, in which he lost consciousness for an unknown period of time and awoke on the floor. denies any preceding palpitations, SOB, or chest pain. Of note, the patient has not been evaluated by a physician in approx 30 years. He attributes his dizziness and syncope to poor PO intake, often citing during the interview that he has "lost the will to live," and would "be better off dead." He denies any concrete plan to commit suicide, and denies any access to firearms in the home. He reports occasional diarrhea, approx 1x/week, but also has normal formed stools, currently denies any diarrhea. Denies any abdominal pain, cramping, nausea or vomiting. Denies any recent travel or sick contacts.     Code status: Full        ED Vitals:     T 98.9  BP 92/62 RR 18 SpO2 93% on RA    Labs: CBC wnl, No electrolyte abnormalities, TBili 2.2, troponin negative x 1, blood EtOH, tylenol, ASA level wnl, COVID19 PCr negative.    CT Chest/AP: No traumatic injury. Extensive nonobstructing left renal stones, including a partial staghorn calculus.    CT Head: No acute intracranial hemorrhage    CT C spine: Degenerative changes, no acute fracture    EKG: Sinus tachycardia at 110    In the ED Patient received 2L NS bolus (08 May 2020 00:50)            ---    HOSPITAL COURSE: Patient was admitted to medicine. Patient was evaluated by neuro ([facility]), cardio ([facility] Suspect falls and dizziness due to dehydration and poor oral intake from depression. Patient presenting with worsening weakness, fatigue, over the past 1 month with 2 admitted falls in his home approx 2 weeks prior to admission with associated LOC and preceding dizziness. Patient without any outpatient followup ~ 30 years, likely with some chronic unaddressed issues. Carotid doppler negative. Pt found to have Deep vein thrombosis (DVT) of popliteal vein of right lower extremity, started on Lovenox - transition to xarelto    Hematology ([facility]) consulted no indication for IVC filter and will f/u as out patient after discharge for hypercoagulable w/u. Course was complicated by episode of hematuria. Urology ([facility]) was consulted CT abdomen significant for L renal stones and partial staghorn calculus, CT pre and post contrast was ordered which showed L solid mass suspicious for neoplasm. US testicle ordered to r/o scrotal mass    Pt will eventually need referral to tertiary center as o/p for percutaneous nephrolithotomy.         For depression, patient was seen by psych Dr. Conley who started Abilify Remeron and Paxil. While patient admitted suicidal ideation on admission, he denies having a concrete plan in place, nor any access to weapons or firearms. Patient was stable off constant observation.         Discussed disposition with patients brother in law. Patient lives alone in an unsafe apartment without a support system or assistance with ADL's. SW and CM were consulted.         ---            FINAL DISCHARGE DIAGNOSIS LIST:    Please see last daily progress note for final discharge diagnoses        --- FROM ADMISSION H+P:     HPI:    Patient is a 64/M without any significant known PMHx who presented to the ED c/o worsening weakness over the past 1 month with associated chills that he reports began on 3/30/20. Since that time he reports progressive weakness, fatigue, laying in bed for the majority of the day, difficulty with ambulation, and poor PO intake as he reports he no longer felt he had the desire to eat. He denies any associated SOB, cough, PARDO, but reports difficulty with ambulation as he feels his legs are weak. He reports a fall approx 2 weeks ago which occurred in his apartment which was preceded by dizziness which was described as a feeling of unsteadiness, in which he lost consciousness for an unknown period of time and awoke on the floor. denies any preceding palpitations, SOB, or chest pain. Of note, the patient has not been evaluated by a physician in approx 30 years. He attributes his dizziness and syncope to poor PO intake, often citing during the interview that he has "lost the will to live," and would "be better off dead." He denies any concrete plan to commit suicide, and denies any access to firearms in the home. He reports occasional diarrhea, approx 1x/week, but also has normal formed stools, currently denies any diarrhea. Denies any abdominal pain, cramping, nausea or vomiting. Denies any recent travel or sick contacts.     Code status: Full        ED Vitals:     T 98.9  BP 92/62 RR 18 SpO2 93% on RA    Labs: CBC wnl, No electrolyte abnormalities, TBili 2.2, troponin negative x 1, blood EtOH, tylenol, ASA level wnl, COVID19 PCr negative.    CT Chest/AP: No traumatic injury. Extensive nonobstructing left renal stones, including a partial staghorn calculus.    CT Head: No acute intracranial hemorrhage    CT C spine: Degenerative changes, no acute fracture    EKG: Sinus tachycardia at 110    In the ED Patient received 2L NS bolus (08 May 2020 00:50)            ---    HOSPITAL COURSE: Patient was admitted to medicine. Patient was evaluated by neuro ([facility]), cardio ([facility] Suspect falls and dizziness due to dehydration and poor oral intake from depression. Patient presenting with worsening weakness, fatigue, over the past 1 month with 2 admitted falls in his home approx 2 weeks prior to admission with associated LOC and preceding dizziness. Patient without any outpatient followup ~ 30 years, likely with some chronic unaddressed issues. Carotid doppler negative. Pt found to have Deep vein thrombosis (DVT) of popliteal vein of right lower extremity, started on Lovenox - transition to xarelto    Hematology ([facility]) consulted no indication for IVC filter and will f/u as out patient after discharge for hypercoagulable w/u. Course was complicated by episode of hematuria. Urology ([facility]) was consulted CT abdomen significant for L renal stones and partial staghorn calculus, CT pre and post contrast was ordered which showed L solid mass suspicious for neoplasm. US scrotal ordered to r/o scrotal mass        Pt will eventually need referral to tertiary center as o/p for percutaneous nephrolithotomy.         For depression, patient was seen by psych Dr. Conley who started Abilify Remeron and Paxil. While patient admitted suicidal ideation on admission, he denies having a concrete plan in place, nor any access to weapons or firearms. Patient was stable off constant observation.         For lack of appetite, pt was seen by nutritionist. HGB A1c was ordered and pt was given Ensure nutritional supplement with meals. Consider Remeron outpatient if appetite is still poor once behavior medication regimen is adjusted.        Discussed disposition with patients brother in law. Patient lives alone in an unsafe apartment without a support system or assistance with ADL's. SW and CM were consulted.         ---            FINAL DISCHARGE DIAGNOSIS LIST:    Please see last daily progress note for final discharge diagnoses        --- FROM ADMISSION H+P:     HPI:    Patient is a 64/M without any significant known PMHx who presented to the ED c/o worsening weakness over the past 1 month with associated chills that he reports began on 3/30/20. Since that time he reports progressive weakness, fatigue, laying in bed for the majority of the day, difficulty with ambulation, and poor PO intake as he reports he no longer felt he had the desire to eat. He denies any associated SOB, cough, PARDO, but reports difficulty with ambulation as he feels his legs are weak. He reports a fall approx 2 weeks ago which occurred in his apartment which was preceded by dizziness which was described as a feeling of unsteadiness, in which he lost consciousness for an unknown period of time and awoke on the floor. denies any preceding palpitations, SOB, or chest pain. Of note, the patient has not been evaluated by a physician in approx 30 years. He attributes his dizziness and syncope to poor PO intake, often citing during the interview that he has "lost the will to live," and would "be better off dead." He denies any concrete plan to commit suicide, and denies any access to firearms in the home. He reports occasional diarrhea, approx 1x/week, but also has normal formed stools, currently denies any diarrhea. Denies any abdominal pain, cramping, nausea or vomiting. Denies any recent travel or sick contacts.     Code status: Full        ED Vitals:     T 98.9  BP 92/62 RR 18 SpO2 93% on RA    Labs: CBC wnl, No electrolyte abnormalities, TBili 2.2, troponin negative x 1, blood EtOH, tylenol, ASA level wnl, COVID19 PCr negative.    CT Chest/AP: No traumatic injury. Extensive nonobstructing left renal stones, including a partial staghorn calculus.    CT Head: No acute intracranial hemorrhage    CT C spine: Degenerative changes, no acute fracture    EKG: Sinus tachycardia at 110    In the ED Patient received 2L NS bolus (08 May 2020 00:50)            ---    HOSPITAL COURSE: Patient was admitted to medicine. Patient was evaluated by neuro ([facility]), cardio ([facility] Suspect falls and dizziness due to dehydration and poor oral intake from depression. Patient presenting with worsening weakness, fatigue, over the past 1 month with 2 admitted falls in his home approx 2 weeks prior to admission with associated LOC and preceding dizziness. Patient without any outpatient followup ~ 30 years, likely with some chronic unaddressed issues. Carotid doppler negative. Pt found to have Deep vein thrombosis (DVT) of popliteal vein of right lower extremity, started on Lovenox - transition to xarelto    Hematology ([facility]) consulted no indication for IVC filter and will f/u as out patient after discharge for hypercoagulable w/u. Course was complicated by episode of hematuria. Urology ([facility]) was consulted CT abdomen significant for L renal stones and partial staghorn calculus, CT pre and post contrast was ordered which showed L solid mass suspicious for neoplasm. US scrotal ordered to r/o scrotal mass        Pt will eventually need referral to tertiary center as o/p for percutaneous nephrolithotomy.         For depression, patient was seen by psych Dr. Conley who started Abilify Remeron and Paxil. While patient admitted suicidal ideation on admission, he denies having a concrete plan in place, nor any access to weapons or firearms. Patient was stable off constant observation.         For lack of appetite, pt was seen by nutritionist. HGB A1c was ordered and pt was given Ensure nutritional supplement with meals.         Discussed disposition with patients brother in law. Patient lives alone in an unsafe apartment without a support system or assistance with ADL's. SW and CM were consulted.         ---            FINAL DISCHARGE DIAGNOSIS LIST:    Please see last daily progress note for final discharge diagnoses        --- FROM ADMISSION H+P:     HPI:    Patient is a 64/M without any significant known PMHx who presented to the ED c/o worsening weakness over the past 1 month with associated chills that he reports began on 3/30/20. Since that time he reports progressive weakness, fatigue, laying in bed for the majority of the day, difficulty with ambulation, and poor PO intake as he reports he no longer felt he had the desire to eat. He denies any associated SOB, cough, PARDO, but reports difficulty with ambulation as he feels his legs are weak. He reports a fall approx 2 weeks ago which occurred in his apartment which was preceded by dizziness which was described as a feeling of unsteadiness, in which he lost consciousness for an unknown period of time and awoke on the floor. denies any preceding palpitations, SOB, or chest pain. Of note, the patient has not been evaluated by a physician in approx 30 years. He attributes his dizziness and syncope to poor PO intake, often citing during the interview that he has "lost the will to live," and would "be better off dead." He denies any concrete plan to commit suicide, and denies any access to firearms in the home. He reports occasional diarrhea, approx 1x/week, but also has normal formed stools, currently denies any diarrhea. Denies any abdominal pain, cramping, nausea or vomiting. Denies any recent travel or sick contacts.     Code status: Full        ED Vitals:     T 98.9  BP 92/62 RR 18 SpO2 93% on RA    Labs: CBC wnl, No electrolyte abnormalities, TBili 2.2, troponin negative x 1, blood EtOH, tylenol, ASA level wnl, COVID19 PCr negative.    CT Chest/AP: No traumatic injury. Extensive nonobstructing left renal stones, including a partial staghorn calculus.    CT Head: No acute intracranial hemorrhage    CT C spine: Degenerative changes, no acute fracture    EKG: Sinus tachycardia at 110    In the ED Patient received 2L NS bolus (08 May 2020 00:50)            ---    HOSPITAL COURSE: Patient was admitted to medicine. Patient was evaluated by neuro ([facility]), cardio ([facility] Suspect falls and dizziness due to dehydration and poor oral intake from depression. Patient presenting with worsening weakness, fatigue, over the past 1 month with 2 admitted falls in his home approx 2 weeks prior to admission with associated LOC and preceding dizziness. Patient without any outpatient followup ~ 30 years, likely with some chronic unaddressed issues. Carotid doppler negative. Pt found to have Deep vein thrombosis (DVT) of popliteal vein of right lower extremity, started on Lovenox - transition to xarelto    Hematology ([facility]) consulted no indication for IVC filter and will f/u as out patient after discharge for hypercoagulable w/u. Course was complicated by episode of hematuria. Urology ([facility]) was consulted CT abdomen significant for L renal stones and partial staghorn calculus, CT pre and post contrast was ordered which showed L solid mass suspicious for neoplasm.  This was discussed with Dr. Otto who recommended to follow up outpatient for further workup and management        Pt will eventually need referral to tertiary center as o/p for percutaneous nephrolithotomy and possible cystoscopy.         For depression, patient was seen by psych Dr. Conley who started Abilify Remeron and Paxil. While patient admitted suicidal ideation on admission, he denies having a concrete plan in place, nor any access to weapons or firearms. Patient was stable off constant observation.         For lack of appetite, pt was seen by nutritionist. HGB A1c was ordered and pt was given Ensure nutritional supplement with meals.         Discussed disposition with patients brother in law. Patient lives alone in an unsafe apartment without a support system or assistance with ADL's. SW and CM were consulted. Patient to be discharged to inpatient psych. Family in agreement.        ---            FINAL DISCHARGE DIAGNOSIS LIST:    Please see last daily progress note for final discharge diagnoses        --- FROM ADMISSION H+P:     HPI:    Patient is a 64/M without any significant known PMHx who presented to the ED c/o worsening weakness over the past 1 month with associated chills that he reports began on 3/30/20. Since that time he reports progressive weakness, fatigue, laying in bed for the majority of the day, difficulty with ambulation, and poor PO intake as he reports he no longer felt he had the desire to eat. He denies any associated SOB, cough, PARDO, but reports difficulty with ambulation as he feels his legs are weak. He reports a fall approx 2 weeks ago which occurred in his apartment which was preceded by dizziness which was described as a feeling of unsteadiness, in which he lost consciousness for an unknown period of time and awoke on the floor. denies any preceding palpitations, SOB, or chest pain. Of note, the patient has not been evaluated by a physician in approx 30 years. He attributes his dizziness and syncope to poor PO intake, often citing during the interview that he has "lost the will to live," and would "be better off dead." He denies any concrete plan to commit suicide, and denies any access to firearms in the home. He reports occasional diarrhea, approx 1x/week, but also has normal formed stools, currently denies any diarrhea. Denies any abdominal pain, cramping, nausea or vomiting. Denies any recent travel or sick contacts.     Code status: Full        ED Vitals:     T 98.9  BP 92/62 RR 18 SpO2 93% on RA    Labs: CBC wnl, No electrolyte abnormalities, TBili 2.2, troponin negative x 1, blood EtOH, tylenol, ASA level wnl, COVID19 PCr negative.    CT Chest/AP: No traumatic injury. Extensive nonobstructing left renal stones, including a partial staghorn calculus.    CT Head: No acute intracranial hemorrhage    CT C spine: Degenerative changes, no acute fracture    EKG: Sinus tachycardia at 110    In the ED Patient received 2L NS bolus (08 May 2020 00:50)            ---    HOSPITAL COURSE: Patient was admitted to medicine. Patient was evaluated by neuro ([facility]), cardio ([facility] Suspect falls and dizziness due to dehydration and poor oral intake from depression. Patient presenting with worsening weakness, fatigue, over the past 1 month with 2 admitted falls in his home approx 2 weeks prior to admission with associated LOC and preceding dizziness. Patient without any outpatient followup ~ 30 years, likely with some chronic unaddressed issues. Carotid doppler negative. Pt found to have Deep vein thrombosis (DVT) of popliteal vein of right lower extremity, started on Lovenox - transition to xarelto    Hematology ([facility]) consulted no indication for IVC filter and will f/u as out patient after discharge for hypercoagulable w/u. Course was complicated by episode of hematuria. Urology ([facility]) was consulted CT abdomen significant for L renal stones and partial staghorn calculus, CT pre and post contrast was ordered which showed L solid mass suspicious for neoplasm.  This was discussed with Dr. Otto who recommended to follow up outpatient for further workup and management    Pt to follow with Dr. Terrance Mo at [facility] [phone] within 4-6 weeks        Pt will eventually need referral to tertiary center as o/p for percutaneous nephrolithotomy and cystoscopy.         For depression, patient was seen by psych Dr. Conley who started Abilify Remeron and Paxil. While patient admitted suicidal ideation on admission, he denies having a concrete plan in place, nor any access to weapons or firearms. Patient was stable off constant observation.         For lack of appetite, pt was seen by nutritionist. HGB A1c was ordered and pt was given Ensure nutritional supplement with meals.         Discussed disposition with patients brother in law. Patient lives alone in an unsafe apartment without a support system or assistance with ADL's. SW and CM were consulted. Patient to be discharged to inpatient psych. Family in agreement.        ---            FINAL DISCHARGE DIAGNOSIS LIST:    Please see last daily progress note for final discharge diagnoses        --- FROM ADMISSION H+P:     HPI:    Patient is a 64/M without any significant known PMHx who presented to the ED c/o worsening weakness over the past 1 month with associated chills that he reports began on 3/30/20. Since that time he reports progressive weakness, fatigue, laying in bed for the majority of the day, difficulty with ambulation, and poor PO intake as he reports he no longer felt he had the desire to eat. He denies any associated SOB, cough, PARDO, but reports difficulty with ambulation as he feels his legs are weak. He reports a fall approx 2 weeks ago which occurred in his apartment which was preceded by dizziness which was described as a feeling of unsteadiness, in which he lost consciousness for an unknown period of time and awoke on the floor. denies any preceding palpitations, SOB, or chest pain. Of note, the patient has not been evaluated by a physician in approx 30 years. He attributes his dizziness and syncope to poor PO intake, often citing during the interview that he has "lost the will to live," and would "be better off dead." He denies any concrete plan to commit suicide, and denies any access to firearms in the home. He reports occasional diarrhea, approx 1x/week, but also has normal formed stools, currently denies any diarrhea. Denies any abdominal pain, cramping, nausea or vomiting. Denies any recent travel or sick contacts.     Code status: Full        ED Vitals:     T 98.9  BP 92/62 RR 18 SpO2 93% on RA    Labs: CBC wnl, No electrolyte abnormalities, TBili 2.2, troponin negative x 1, blood EtOH, tylenol, ASA level wnl, COVID19 PCr negative.    CT Chest/AP: No traumatic injury. Extensive nonobstructing left renal stones, including a partial staghorn calculus.    CT Head: No acute intracranial hemorrhage    CT C spine: Degenerative changes, no acute fracture    EKG: Sinus tachycardia at 110    In the ED Patient received 2L NS bolus (08 May 2020 00:50)            ---    HOSPITAL COURSE: Patient was admitted to medicine. Patient was evaluated by neuro ([facility]), cardio ([facility] Suspect falls and dizziness due to dehydration and poor oral intake from depression. Patient presenting with worsening weakness, fatigue, over the past 1 month with 2 admitted falls in his home approx 2 weeks prior to admission with associated LOC and preceding dizziness. Patient without any outpatient followup ~ 30 years, likely with some chronic unaddressed issues. Carotid doppler negative. Pt found to have Deep vein thrombosis (DVT) of popliteal vein of right lower extremity, started on Lovenox - transition to xarelto    Hematology ([facility]) consulted no indication for IVC filter and will f/u as out patient after discharge for hypercoagulable w/u. Course was complicated by episode of hematuria. Urology ([facility]) was consulted CT abdomen significant for L renal stones and partial staghorn calculus, CT pre and post contrast was ordered which showed L solid mass suspicious for neoplasm.  This was discussed with Dr. Otto who recommended to follow up outpatient for further workup and management    Pt to follow with Dr. Terrance Mo at [facility] [phone] within 4-6 weeks        Pt will eventually need referral to tertiary center as o/p for percutaneous nephrolithotomy and cystoscopy.         For depression, patient was seen by psych Dr. Conley who started Abilify Remeron and Paxil. While patient admitted suicidal ideation on admission, he denies having a concrete plan in place, nor any access to weapons or firearms. Patient was stable off constant observation.         For lack of appetite, pt was seen by nutritionist. HGB A1c was ordered and pt was given Ensure nutritional supplement with meals.         Discussed disposition with patients brother in law. Patient lives alone in an unsafe apartment without a support system or assistance with ADL's. SW and CM were consulted. Patient to be discharged to inpatient psych with close follow up as outpatient with urology and primary medical provider.  Family in agreement.        Time spent: 65 minutes        ---            FINAL DISCHARGE DIAGNOSIS LIST:    Please see last daily progress note for final discharge diagnoses        ---

## 2020-05-11 NOTE — DISCHARGE NOTE PROVIDER - CARE PROVIDER_API CALL
Beverly Hurtado  FAMILY MEDICINE  [address] [place] [address]  Phone: [phone]  Fax: [phone]  Follow Up Time: 1 week Beverly Hurtado  FAMILY MEDICINE  [address] [place] [address]  Phone: [phone]  Fax: [phone]  Follow Up Time: 1 week    Pasha Sibley  Urology  [address] [facility] [address]  [place] [address]  Phone: [phone]  Fax: [phone]  Follow Up Time: 1 week    Norman Hobbs  Hematology/Oncology  [address] [facility] [address]  [place] [address]  Phone: [phone]  Fax: [phone]  Follow Up Time: Beverly Hurtado  FAMILY MEDICINE  [address] [place] [address]  Phone: [phone]  Fax: [phone]  Follow Up Time: 1 week    Pasha Sibley)  Urology  [address] [facility] [address]  [place] [address]  Phone: [phone]  Fax: [phone]  Follow Up Time: 1 week    Norman Hobbs  [facility]  [address] [facility] [address]  [place] [address]  Phone: [phone]  Fax: [phone]  Follow Up Time: Beverly Hurtado  FAMILY MEDICINE  [address] [place] [address]  Phone: [phone]  Fax: [phone]  Follow Up Time: 1 week    Norman Hobbs  [facility]  [address] [facility] [address]  [place] [address]  Phone: [phone]  Fax: [phone]  Follow Up Time:     Terrance Mo  UROLOGY  [address] [place] [address]  Phone: [phone]  Fax: [phone]  Follow Up Time: 1 month

## 2020-05-11 NOTE — PROGRESS NOTE ADULT - ASSESSMENT
65 yo male with right leg DVT probably provoked related to immobility, initially treated with Lovenox, now bridged to xarelto   No evidence of malignancy on scans  family history notable for sister that may have past away from PE after a extremity stent procedure?  Depression with ?suicidal ideation    Recommendations:  - continue anticoagulation with Xarelto 15mg BID for 3 weeks total and then 20mg daily for at least 3 months total anticoagulation; at that time will repeat dopplers and D-dimer  - hypercoag work-up as outpatient indicated  - hematuria on 5/10/20 seems to have resolved; appreciate urology input; noted stable/improved Hg  - no additional Heme/onc interventions indicated at this time  - patient can follow up in office upon discharge  - will sign off; reconsult if needed

## 2020-05-11 NOTE — PROGRESS NOTE ADULT - PROBLEM SELECTOR PLAN 7
Patient noted to have overgrown toenails of b/l feet, thick and discolored, c/w onychomycosis  -patient will likely require assistance in trimming toenails  -Podiatry consulted Patient noted to have overgrown toenails of b/l feet, thick and discolored, c/w onychomycosis  -patient will likely require assistance in trimming toenails  -Podiatry reccs appreicated

## 2020-05-11 NOTE — DISCHARGE NOTE PROVIDER - PROVIDER TOKENS
PROVIDER:[TOKEN:[[id number]],FOLLOWUP:[1 week]] PROVIDER:[TOKEN:[[id number]],FOLLOWUP:[1 week]],PROVIDER:[TOKEN:[[id number]],FOLLOWUP:[1 week]],PROVIDER:[TOKEN:[[id number]]] PROVIDER:[TOKEN:[[id number]],FOLLOWUP:[1 week]],PROVIDER:[TOKEN:[[id number]]],PROVIDER:[TOKEN:[[id number]],FOLLOWUP:[1 month]]

## 2020-05-11 NOTE — DISCHARGE NOTE PROVIDER - NSDCFUADDINST_GEN_ALL_CORE_FT
- Please make an appointment for follow up with your primary doctor within 1 week, if you do not have a primary doctor, you may follow up with Dr. Buck.   - Please make an appointment for follow up with Dr. Sibley (urology) (information above)  - Patient or caretaker is responsible for making all appointments  - Please return to the ED if you develop worsening symptoms or fever - Please make an appointment for follow up with your primary doctor within 1 week, if you do not have a primary doctor, you may follow up with Dr. Buck.   - Please make an appointment for follow up with Dr. Sibley (urology) and Dr. Hobbs (Hematology) (information above)  - Patient or caretaker is responsible for making all appointments  - Please return to the ED if you develop worsening symptoms or fever - Please make an appointment for follow up with your primary doctor within 1 week, if you do not have a primary doctor, you may follow up with Dr. Buck.   - Please make an appointment for follow up with Dr. Terrance Mo at [facility] [phone] (urology) and Dr. Hobbs (Hematology) (information above)  - Patient or caretaker is responsible for making all appointments  - Please return to the ED if you develop worsening symptoms or fever

## 2020-05-11 NOTE — PROGRESS NOTE ADULT - SUBJECTIVE AND OBJECTIVE BOX
Patient seen and examined;  Chart reviewed and events noted;   feeling poor; denies any further hematuria    MEDICATIONS  (STANDING):  ARIPiprazole 2 milliGRAM(s) Oral daily  folic acid 1 milliGRAM(s) Oral daily  mirtazapine 30 milliGRAM(s) Oral at bedtime  pantoprazole    Tablet 40 milliGRAM(s) Oral before breakfast  PARoxetine 30 milliGRAM(s) Oral daily  polyethylene glycol 3350 17 Gram(s) Oral two times a day  rivaroxaban 15 milliGRAM(s) Oral two times a day with meals  senna 2 Tablet(s) Oral at bedtime    MEDICATIONS  (PRN):      Vital Signs Last 24 Hrs  T(C): 36.4 (11 May 2020 12:29), Max: 36.9 (11 May 2020 04:22)  T(F): 97.5 (11 May 2020 12:29), Max: 98.4 (11 May 2020 04:22)  HR: 92 (11 May 2020 12:29) (90 - 92)  BP: 107/71 (11 May 2020 12:29) (103/66 - 112/74)  RR: 16 (11 May 2020 12:29) (16 - 18)  SpO2: 98% (11 May 2020 12:29) (91% - 98%)    PHYSICAL EXAM  General: adult in NAD; depressed mood  HEENT: clear oropharynx, anicteric sclera, pink conjunctivae  Neck: supple  CV: normal S1S2 with no murmur rubs or gallops  Lungs: clear to auscultation, no wheezes, no rhales  Abdomen: soft non-tender non-distended, no hepato/splenomegaly  Ext: no clubbing cyanosis or edema  Skin: no rashes and no petichiae  Neuro: alert and oriented X3 no focal deficits      LABS:                        13.4   8.18  )-----------( 187      ( 11 May 2020 07:10 )             39.4     Hemoglobin: 13.4 g/dL (05-11 @ 07:10)  Hemoglobin: 12.8 g/dL (05-10 @ 11:02)  Hemoglobin: 14.4 g/dL (05-07 @ 20:12)    05-11    141  |  106  |  15  ----------------------------<  100<H>  3.6   |  26  |  1.10    Ca    8.3<L>      11 May 2020 07:10  Mg     2.3     05-11    TPro  5.9<L>  /  Alb  2.6<L>  /  TBili  1.1  /  DBili  x   /  AST  18  /  ALT  18  /  AlkPhos  36<L>  05-10

## 2020-05-11 NOTE — PROGRESS NOTE ADULT - ASSESSMENT
64/M without any significant known PMHx who presented to the ED c/o worsening weakness over the past 1 month with associated chills, and syncope, admitted for r/o COVID19 and syncope workup. Also found to have depression and suicidal ideation with significant social concerns

## 2020-05-11 NOTE — CONSULT NOTE ADULT - SUBJECTIVE AND OBJECTIVE BOX
CHIEF COMPLAINT: weakness    HISTORY OF PRESENT ILLNESS: 65 y/o admitted with multiple falls, weakness, suicidal ideations was found on admission to have DVT and started on anticoagulation. )n 5/10 he had gross hematuria without other voiding symptoms and we are called in consult. Pt had non contrast CT which reveals a left staghorn calculus and additional left renal stones. He denies difficulty voiding voiding, and has never had a CHATO. He has had sporadic hematuria in the past.    PAST MEDICAL & SURGICAL HISTORY:  No pertinent past medical history  History of vocal cord polypectomy  Benign parotid tumor: 1983  History of appendectomy: 1965      REVIEW OF SYSTEMS:    CONSTITUTIONAL: + weakness, no fevers or chills  EYES/ENT: No visual changes;  No vertigo or throat pain   NECK: No pain or stiffness  RESPIRATORY: No cough, wheezing, hemoptysis; No shortness of breath  CARDIOVASCULAR: No chest pain or palpitations  GASTROINTESTINAL: No abdominal or epigastric pain. No nausea, vomiting, or hematemesis; No diarrhea or constipation. No melena or hematochezia.  GENITOURINARY: No dysuria, frequency or hematuria  NEUROLOGICAL: No numbness or weakness  SKIN: No itching, burning, rashes, or lesions   All other review of systems is negative unless indicated above.    MEDICATIONS  (STANDING):  mirtazapine 15 milliGRAM(s) Oral at bedtime  pantoprazole    Tablet 40 milliGRAM(s) Oral before breakfast  PARoxetine 20 milliGRAM(s) Oral daily  polyethylene glycol 3350 17 Gram(s) Oral two times a day  rivaroxaban 15 milliGRAM(s) Oral two times a day with meals  senna 2 Tablet(s) Oral at bedtime    MEDICATIONS  (PRN):      Allergies    No Known Allergies    Intolerances        SOCIAL HISTORY:    FAMILY HISTORY:  Family history of CVA (Father)  Family history of myocardial infarction (Father)  Family history of type 2 diabetes mellitus (Father)      Vital Signs Last 24 Hrs  T(C): 36.9 (11 May 2020 04:22), Max: 36.9 (11 May 2020 04:22)  T(F): 98.4 (11 May 2020 04:22), Max: 98.4 (11 May 2020 04:22)  HR: 90 (11 May 2020 04:22) (88 - 90)  BP: 112/74 (11 May 2020 04:22) (100/68 - 112/74)  BP(mean): --  RR: 17 (11 May 2020 04:22) (17 - 18)  SpO2: 93% (11 May 2020 04:22) (91% - 96%)    PHYSICAL EXAM:    Constitutional: NAD, well-developed  HEENT: ROSA ISELA, EOMI, Normal Hearing, MMM  Neck: No LAD, No JVD  Back: Normal spine flexure, No CVA tenderness  Respiratory: CTAB   Cardiovascular: S1 and S2, RRR, no M/G/R  Abd: BS+, soft, NT/ND, No CVAT  : Normal phallus,open meatus,bilateral descended testes, supra testicular mass on rt, pobably cystic.  CHATO: Normal prostate, 1+ smooth, non tender, no nodules, no masses  Extremities: No peripheral edema  Vascular: 2+ peripheral pulses  Neurological: A/O x 3, no focal deficits  Psychiatric: Normal mood, normal affect  Musculoskeletal: 5/5 strength b/l upper and lower extremities  Skin: No rashes    LABS:                        13.4   8.18  )-----------( 187      ( 11 May 2020 07:10 )             39.4     05-11    141  |  106  |  15  ----------------------------<  100<H>  3.6   |  26  |  1.10    Ca    8.3<L>      11 May 2020 07:10  Mg     2.3     05-11    TPro  5.9<L>  /  Alb  2.6<L>  /  TBili  1.1  /  DBili  x   /  AST  18  /  ALT  18  /  AlkPhos  36<L>  05-10        Urine Culture:   Culture - Urine (05.08.20 @ 09:09)    Specimen Source: .Urine Clean Catch (Midstream)    Culture Results:   No growth    urine    RADIOLOGY & ADDITIONAL STUDIES:  EXAM:  CT CHEST                          EXAM:  CT ABDOMEN AND PELVIS                            PROCEDURE DATE:  05/07/2020          INTERPRETATION:  CLINICAL INFORMATION: Status post fall    COMPARISON: None.    PROCEDURE:   CT of the Chest, Abdomen and Pelvis was performed without intravenous contrast.   Intravenous contrast: None.  Oral contrast: None.  Sagittal and coronal reformats were performed.    FINDINGS:    CHEST:     LUNGS AND LARGE AIRWAYS: Patent central airways. No pulmonary nodules. Mild bilateral lower lobe subsegmental atelectasis.  PLEURA: No pleural effusion.  VESSELS: Normal caliber aorta. Mild coronary artery calcification.  HEART: Heart size is normal. No pericardial effusion.  MEDIASTINUM AND SELVIN: No lymphadenopathy.  CHEST WALL AND LOWER NECK: Within normal limits.    ABDOMEN AND PELVIS:    LIVER: Within normal limits.  BILE DUCTS: Normal caliber.  GALLBLADDER: Multiple gallstones.  SPLEEN: Within normal limits.  PANCREAS: Within normal limits.  ADRENALS: Within normal limits.  KIDNEYS/URETERS: 1.9 cm nonobstructing left upper pole renal stone. Partial staghorn left renal calculus with multiple small stones versus fragmented larger stone in the left renal pelvis. No hydronephrosis. 2.7 cm hypodense lesion in the posterior aspect of the left kidney which is unable to be characterized on this unenhanced CT scan.    BLADDER: Within normal limits.  REPRODUCTIVE ORGANS: Prostate within normal limits.    BOWEL: No bowel obstruction. Appendix not identified. Mild colonic diverticulosis.  PERITONEUM: No ascites or hemoperitoneum.  VESSELS: Mild atherosclerotic arterial calcifications.  RETROPERITONEUM/LYMPH NODES: No lymphadenopathy.    ABDOMINAL WALL: Within normal limits.  BONES: No fractures.    IMPRESSION:     No traumatic injury.  Extensive nonobstructing left renal stones, including a partial staghorn calculus.                    WILLIAMS GARDNER M.D.,ATTENDING RADIOLOGIST  This document has been electronically signed. May  7 2020  9:05PM CHIEF COMPLAINT: weakness    HISTORY OF PRESENT ILLNESS: 65 y/o admitted with multiple falls, weakness, suicidal ideations was found on admission to have DVT and started on anticoagulation. )n 5/10 he had gross hematuria without other voiding symptoms and we are called in consult. Pt had non contrast CT which reveals a left staghorn calculus and additional left renal stones. He denies difficulty voiding voiding, and has never had a CHATO. He has had sporadic hematuria in the past.    PAST MEDICAL & SURGICAL HISTORY:  No pertinent past medical history  History of vocal cord polypectomy  Benign parotid tumor: 1983  History of appendectomy: 1965      REVIEW OF SYSTEMS:    CONSTITUTIONAL: + weakness, no fevers or chills  EYES/ENT: No visual changes;  No vertigo or throat pain   NECK: No pain or stiffness  RESPIRATORY: No cough, wheezing, hemoptysis; No shortness of breath  CARDIOVASCULAR: No chest pain or palpitations  GASTROINTESTINAL: No abdominal or epigastric pain. No nausea, vomiting, or hematemesis; No diarrhea or constipation. No melena or hematochezia.  GENITOURINARY: No dysuria, frequency or hematuria  NEUROLOGICAL: No numbness or weakness  SKIN: No itching, burning, rashes, or lesions   All other review of systems is negative unless indicated above.    MEDICATIONS  (STANDING):  mirtazapine 15 milliGRAM(s) Oral at bedtime  pantoprazole    Tablet 40 milliGRAM(s) Oral before breakfast  PARoxetine 20 milliGRAM(s) Oral daily  polyethylene glycol 3350 17 Gram(s) Oral two times a day  rivaroxaban 15 milliGRAM(s) Oral two times a day with meals  senna 2 Tablet(s) Oral at bedtime    MEDICATIONS  (PRN):      Allergies    No Known Allergies    Intolerances        SOCIAL HISTORY:    FAMILY HISTORY:  Family history of CVA (Father)  Family history of myocardial infarction (Father)  Family history of type 2 diabetes mellitus (Father)      Vital Signs Last 24 Hrs  T(C): 36.9 (11 May 2020 04:22), Max: 36.9 (11 May 2020 04:22)  T(F): 98.4 (11 May 2020 04:22), Max: 98.4 (11 May 2020 04:22)  HR: 90 (11 May 2020 04:22) (88 - 90)  BP: 112/74 (11 May 2020 04:22) (100/68 - 112/74)  BP(mean): --  RR: 17 (11 May 2020 04:22) (17 - 18)  SpO2: 93% (11 May 2020 04:22) (91% - 96%)    PHYSICAL EXAM:    Constitutional: NAD, well-developed  HEENT: ROSA ISELA, EOMI, Normal Hearing, MMM  Neck: No LAD, No JVD  Back: Normal spine flexure, No CVA tenderness  Respiratory: not using accessory muscles  Cardiovascular: S1 and S2, RRR, no M/G/R  Abd: BS+, soft, NT/ND, No CVAT  : Normal phallus,open meatus,bilateral descended testes, supra testicular mass on rt, pobably cystic.  CHATO: Normal prostate, 1+ smooth, non tender, no nodules, no masses  Extremities: No peripheral edema  Vascular: 2+ peripheral pulses  Neurological: A/O x 3, no focal deficits  Psychiatric: Normal mood, normal affect  Musculoskeletal: 5/5 strength b/l upper and lower extremities  Skin: No rashes    LABS:                        13.4   8.18  )-----------( 187      ( 11 May 2020 07:10 )             39.4     05-11    141  |  106  |  15  ----------------------------<  100<H>  3.6   |  26  |  1.10    Ca    8.3<L>      11 May 2020 07:10  Mg     2.3     05-11    TPro  5.9<L>  /  Alb  2.6<L>  /  TBili  1.1  /  DBili  x   /  AST  18  /  ALT  18  /  AlkPhos  36<L>  05-10        Urine Culture:   Culture - Urine (05.08.20 @ 09:09)    Specimen Source: .Urine Clean Catch (Midstream)    Culture Results:   No growth    urine    RADIOLOGY & ADDITIONAL STUDIES:  EXAM:  CT CHEST                          EXAM:  CT ABDOMEN AND PELVIS                            PROCEDURE DATE:  05/07/2020          INTERPRETATION:  CLINICAL INFORMATION: Status post fall    COMPARISON: None.    PROCEDURE:   CT of the Chest, Abdomen and Pelvis was performed without intravenous contrast.   Intravenous contrast: None.  Oral contrast: None.  Sagittal and coronal reformats were performed.    FINDINGS:    CHEST:     LUNGS AND LARGE AIRWAYS: Patent central airways. No pulmonary nodules. Mild bilateral lower lobe subsegmental atelectasis.  PLEURA: No pleural effusion.  VESSELS: Normal caliber aorta. Mild coronary artery calcification.  HEART: Heart size is normal. No pericardial effusion.  MEDIASTINUM AND SELVIN: No lymphadenopathy.  CHEST WALL AND LOWER NECK: Within normal limits.    ABDOMEN AND PELVIS:    LIVER: Within normal limits.  BILE DUCTS: Normal caliber.  GALLBLADDER: Multiple gallstones.  SPLEEN: Within normal limits.  PANCREAS: Within normal limits.  ADRENALS: Within normal limits.  KIDNEYS/URETERS: 1.9 cm nonobstructing left upper pole renal stone. Partial staghorn left renal calculus with multiple small stones versus fragmented larger stone in the left renal pelvis. No hydronephrosis. 2.7 cm hypodense lesion in the posterior aspect of the left kidney which is unable to be characterized on this unenhanced CT scan.    BLADDER: Within normal limits.  REPRODUCTIVE ORGANS: Prostate within normal limits.    BOWEL: No bowel obstruction. Appendix not identified. Mild colonic diverticulosis.  PERITONEUM: No ascites or hemoperitoneum.  VESSELS: Mild atherosclerotic arterial calcifications.  RETROPERITONEUM/LYMPH NODES: No lymphadenopathy.    ABDOMINAL WALL: Within normal limits.  BONES: No fractures.    IMPRESSION:     No traumatic injury.  Extensive nonobstructing left renal stones, including a partial staghorn calculus.                    WILLIAMS GARDNER M.D.,ATTENDING RADIOLOGIST  This document has been electronically signed. May  7 2020  9:05PM

## 2020-05-11 NOTE — PROGRESS NOTE BEHAVIORAL HEALTH - NSBHCHARTREVIEWLAB_PSY_A_CORE FT
13.4   8.18  )-----------( 187      ( 11 May 2020 07:10 )             39.4   05-11    141  |  106  |  15  ----------------------------<  100<H>  3.6   |  26  |  1.10    Ca    8.3<L>      11 May 2020 07:10  Mg     2.3     05-11    TPro  5.9<L>  /  Alb  2.6<L>  /  TBili  1.1  /  DBili  x   /  AST  18  /  ALT  18  /  AlkPhos  36<L>  05-10

## 2020-05-11 NOTE — DISCHARGE NOTE PROVIDER - CARE PROVIDERS DIRECT ADDRESSES
[email or web address],[email or web address],DirectAddress_Unknown ,[email or web address],[email or web address],[email or web address]

## 2020-05-12 DIAGNOSIS — Z02.9 ENCOUNTER FOR ADMINISTRATIVE EXAMINATIONS, UNSPECIFIED: ICD-10-CM

## 2020-05-12 LAB
CULTURE RESULTS: SIGNIFICANT CHANGE UP
CULTURE RESULTS: SIGNIFICANT CHANGE UP
SPECIMEN SOURCE: SIGNIFICANT CHANGE UP
SPECIMEN SOURCE: SIGNIFICANT CHANGE UP

## 2020-05-12 PROCEDURE — 99232 SBSQ HOSP IP/OBS MODERATE 35: CPT

## 2020-05-12 PROCEDURE — 99233 SBSQ HOSP IP/OBS HIGH 50: CPT

## 2020-05-12 RX ADMIN — ARIPIPRAZOLE 2 MILLIGRAM(S): 15 TABLET ORAL at 11:50

## 2020-05-12 RX ADMIN — Medication 30 MILLIGRAM(S): at 11:50

## 2020-05-12 RX ADMIN — Medication 1 MILLIGRAM(S): at 11:50

## 2020-05-12 RX ADMIN — RIVAROXABAN 15 MILLIGRAM(S): KIT at 08:10

## 2020-05-12 RX ADMIN — PANTOPRAZOLE SODIUM 40 MILLIGRAM(S): 20 TABLET, DELAYED RELEASE ORAL at 05:15

## 2020-05-12 RX ADMIN — RIVAROXABAN 15 MILLIGRAM(S): KIT at 17:00

## 2020-05-12 NOTE — PROGRESS NOTE ADULT - SUBJECTIVE AND OBJECTIVE BOX
This progress note was completed in part by resident acting as telephonic scribe during COVID-19 crisis. Physical exam and review of systems was completed by attending physician, and findings below are those of the attending physician, and have been reviewed in detail.    Patient is a 64y old  Male who presents with a chief complaint of Weakness (11 May 2020 21:36)    INTERVAL HPI/OVERNIGHT EVENTS:  Patient Had no overnight events. Patient was seen today morning at the bedside. Still c/o generalized weakness and malaise.    Vitals  T(C): 36.6 (05-12-20 @ 06:15), Max: 36.6 (05-12-20 @ 06:15)  HR: 98 (05-12-20 @ 06:15) (92 - 98)  BP: 132/83 (05-12-20 @ 06:15) (107/71 - 132/83)  RR: 17 (05-12-20 @ 06:15) (16 - 17)  SpO2: 95% (05-12-20 @ 06:15) (95% - 98%)  Wt(kg): --  I&O's Summary      REVIEW OF SYSTEMS:  CONSTITUTIONAL: No fever, admits generalized weakness   EYES: No eye pain, visual disturbances, or discharge  RESPIRATORY: No cough, wheezing, chills or hemoptysis; No shortness of breath  CARDIOVASCULAR: No chest pain, palpitations, dizziness, or leg swelling  GASTROINTESTINAL: No abdominal or epigastric pain. No nausea, vomiting, or hematemesis  GENITOURINARY: Episode of hematuria overnight. No dysuria, frequency, or incontinence  NEUROLOGICAL: No headaches, memory loss, loss of strength, numbness, or tremors  SKIN: No itching, burning, rashes, or lesions   ENDOCRINE: No heat or cold intolerance; No hair loss; No polydipsia or polyuria  MUSCULOSKELETAL: No joint pain or swelling; No muscle, back, or extremity pain    PHYSICAL EXAM:  GENERAL: NAD,  well-developed  HEAD:  Atraumatic, Normocephalic  EYES: EOMI, PERRLA, conjunctiva and sclera clear  ENMT: No tonsillar erythema, exudates, or enlargement; Moist mucous membranes, Good dentition, No lesions  NERVOUS SYSTEM:  Alert & Oriented X3, depressed mood; Motor Strength 5/5 B/L upper and lower extremities  CHEST/LUNG: Clear to auscultation bilaterally; No rales, rhonchi, wheezing, or rubs  HEART: Regular rate and rhythm; No murmurs, rubs, or gallops  ABDOMEN: Soft, Nontender, Nondistended; Bowel sounds present  EXTREMITIES:  2+ Peripheral Pulses, No clubbing, cyanosis, or edema  LYMPH: No lymphadenopathy noted  SKIN: No rashes or lesions    MEDICATIONS  (STANDING):  ARIPiprazole 2 milliGRAM(s) Oral daily  folic acid 1 milliGRAM(s) Oral daily  mirtazapine 30 milliGRAM(s) Oral at bedtime  pantoprazole    Tablet 40 milliGRAM(s) Oral before breakfast  PARoxetine 30 milliGRAM(s) Oral daily  polyethylene glycol 3350 17 Gram(s) Oral two times a day  rivaroxaban 15 milliGRAM(s) Oral two times a day with meals  senna 2 Tablet(s) Oral at bedtime    MEDICATIONS  (PRN):      LABS:                        13.4   8.18  )-----------( 187      ( 11 May 2020 07:10 )             39.4     05-11    141  |  106  |  15  ----------------------------<  100<H>  3.6   |  26  |  1.10    Ca    8.3<L>      11 May 2020 07:10  Mg     2.3     05-11    TPro  5.9<L>  /  Alb  2.6<L>  /  TBili  1.1  /  DBili  x   /  AST  18  /  ALT  18  /  AlkPhos  36<L>  05-10        CAPILLARY BLOOD GLUCOSE          05-08 @ 09:09   No growth  --  --          RADIOLOGY & ADDITIONAL TESTS: < from: CT Abdomen and Pelvis w/wo IV Cont (05.11.20 @ 13:56) >  EXAM:  CT ABDOMEN AND PELVIS WAW IC                            *** ADDENDUM 05/11/2020  ***    Findings discussed with Dr. Pritchett at 5/11/2020 2:36 PM with readback.      *** END OF ADDENDUM 05/11/2020  ***      PROCEDURE DATE:  05/11/2020          INTERPRETATION:  CLINICAL INDICATION: 64 years  Male with gross hematuria.     COMPARISON: 5/7/2020    PROCEDURE:   CT of the Abdomen and Pelvis was performed with and without intravenous contrast.   Precontrast, Nephrographic and Excretory phases were acquired.  Intravenous contrast: 95 ml Omnipaque 350. 5 ml discarded.  Oral contrast: None.  Sagittal and coronal reformats were performed.    LIMITATION: Absence of enteric contrast limits evaluation of the GI tract.    FINDINGS:    LOWER CHEST: Bilateral lower lobe discoid atelectasis and mild left lower lobe groundglass density. Developing trace left pleural effusion. Trace pericardial effusion.    LIVER: Stable 1 cm cyst at the hepatic dome. Scattered subcentimeter hypodensities too small to characterize.  BILE DUCTS: Normal caliber.  GALLBLADDER: Multiple gallstones.  SPLEEN: Within normal limits.  PANCREAS: Within normal limits.  ADRENALS: Within normal limits.  KIDNEYS/URETERS: Mildly atrophic left kidney. Dominant 1.9 cm left upper pole renal calculus reidentified. Multiple stone fragments versus partial staghorn calculus in the right renal pelvis reidentified. 3.2 cm left upper pole renal mass measuring 26 Hounsfield units precontrast and 59 Hounsfield units postcontrast suspicious for solid neoplasm. 1.3 cm left lower pole renal cyst. Multiple subcentimeter bilateral hypodensities too small to characterize. Symmetrical nephrograms. No perinephric edema.    BLADDER: Minimal high density layering in the posterior urinary bladder midline consistent with small amount of hemorrhage, calculi or milk of calcium.  REPRODUCTIVE ORGANS: Mildly enlarged prostate measuring 2.4 x 4.9 cm.    BOWEL: No bowel obstruction. Appendix is not visualized and cannot be assessed.. Fluid-filled mildly distended colon from cecum to 2 distal descending colon measuring up to 6 cm in diameter. Formed stool in the sigmoid colon.. Diverticulosis of the transverse colon without diverticulitis.  PERITONEUM: Trace ascites in the left paracolic gutter  VESSELS: Within normal limits.  RETROPERITONEUM/LYMPH NODES: No lymphadenopathy.    ABDOMINAL WALL: Small bilateral fat-containing inguinal hernias. Trace fat-containing umbilical hernia.  BONES: Thoracolumbar degenerative changes.    IMPRESSION:     Absence of enteric contrast limits evaluation of the GI tract.     Enhancing left renal mass suspicious for solid neoplasm.    Left renal calculi reidentified. Small volume of high density in the bladder may represent small volume of hemorrhage, milk of calcium or calculi. In light of hematuria, recommend cystoscopy to exclude bladder pathology.    Ascending colitis.    Bilateral lower lobe discoid atelectasis and mild left lower lobe groundglass density. Developing trace left pleural effusion. Rule out developing pneumonia.    Trace ascites of unclear etiology.    Cholelithiasis.    Message left with floor nurse Meera, who is contacting Dr. Pritchett to return my call at 5/11/2020 2:28 PM with readback.                   ***Please see the addendum at the top of this report. It may contain additional important information or changes.****          RICCARDO LARA M.D., ATTENDING RADIOLOGIST  This document has been electronically signed. May 11 2020  2:31PM    < end of copied text > This progress note was completed in part by resident acting as telephonic scribe during COVID-19 crisis. Physical exam and review of systems was completed by attending physician, and findings below are those of the attending physician, and have been reviewed in detail.    Patient is a 64y old  Male who presents with a chief complaint of Weakness (11 May 2020 21:36)    INTERVAL HPI/OVERNIGHT EVENTS:  Patient Had no overnight events. Patient was seen today morning at the bedside. Still c/o generalized weakness and malaise. Patient was noted to have a panic attack prior to exam this morning. Still very anxious.    Vitals  T(C): 36.6 (05-12-20 @ 06:15), Max: 36.6 (05-12-20 @ 06:15)  HR: 98 (05-12-20 @ 06:15) (92 - 98)  BP: 132/83 (05-12-20 @ 06:15) (107/71 - 132/83)  RR: 17 (05-12-20 @ 06:15) (16 - 17)  SpO2: 95% (05-12-20 @ 06:15) (95% - 98%)  Wt(kg): --  I&O's Summary      REVIEW OF SYSTEMS:  CONSTITUTIONAL: No fever, admits generalized weakness   EYES: No eye pain, visual disturbances, or discharge  RESPIRATORY: No cough, wheezing, chills or hemoptysis; No shortness of breath  CARDIOVASCULAR: No chest pain, palpitations, dizziness, or leg swelling  GASTROINTESTINAL: No abdominal or epigastric pain. No nausea, vomiting, or hematemesis  GENITOURINARY: Episode of hematuria overnight. No dysuria, frequency, or incontinence  NEUROLOGICAL: No headaches, memory loss, loss of strength, numbness, or tremors  SKIN: No itching, burning, rashes, or lesions   ENDOCRINE: No heat or cold intolerance; No hair loss; No polydipsia or polyuria  MUSCULOSKELETAL: No joint pain or swelling; No muscle, back, or extremity pain    PHYSICAL EXAM:  GENERAL: NAD,  well-developed  HEAD:  Atraumatic, Normocephalic  EYES: EOMI, PERRLA, conjunctiva and sclera clear  ENMT: No tonsillar erythema, exudates, or enlargement; Moist mucous membranes, Good dentition, No lesions  NERVOUS SYSTEM:  Alert & Oriented X3, depressed mood; Motor Strength 5/5 B/L upper and lower extremities  CHEST/LUNG: Clear to auscultation bilaterally; No rales, rhonchi, wheezing, or rubs  HEART: Regular rate and rhythm; No murmurs, rubs, or gallops  ABDOMEN: Soft, Nontender, Nondistended; Bowel sounds present  EXTREMITIES:  2+ Peripheral Pulses, No clubbing, cyanosis, or edema  LYMPH: No lymphadenopathy noted  SKIN: No rashes or lesions    MEDICATIONS  (STANDING):  ARIPiprazole 2 milliGRAM(s) Oral daily  folic acid 1 milliGRAM(s) Oral daily  mirtazapine 30 milliGRAM(s) Oral at bedtime  pantoprazole    Tablet 40 milliGRAM(s) Oral before breakfast  PARoxetine 30 milliGRAM(s) Oral daily  polyethylene glycol 3350 17 Gram(s) Oral two times a day  rivaroxaban 15 milliGRAM(s) Oral two times a day with meals  senna 2 Tablet(s) Oral at bedtime    MEDICATIONS  (PRN):      LABS:                        13.4   8.18  )-----------( 187      ( 11 May 2020 07:10 )             39.4     05-11    141  |  106  |  15  ----------------------------<  100<H>  3.6   |  26  |  1.10    Ca    8.3<L>      11 May 2020 07:10  Mg     2.3     05-11    TPro  5.9<L>  /  Alb  2.6<L>  /  TBili  1.1  /  DBili  x   /  AST  18  /  ALT  18  /  AlkPhos  36<L>  05-10        CAPILLARY BLOOD GLUCOSE          05-08 @ 09:09   No growth  --  --          RADIOLOGY & ADDITIONAL TESTS: < from: CT Abdomen and Pelvis w/wo IV Cont (05.11.20 @ 13:56) >  EXAM:  CT ABDOMEN AND PELVIS WAW IC                            *** ADDENDUM 05/11/2020  ***    Findings discussed with Dr. Pritchett at 5/11/2020 2:36 PM with readback.      *** END OF ADDENDUM 05/11/2020  ***      PROCEDURE DATE:  05/11/2020          INTERPRETATION:  CLINICAL INDICATION: 64 years  Male with gross hematuria.     COMPARISON: 5/7/2020    PROCEDURE:   CT of the Abdomen and Pelvis was performed with and without intravenous contrast.   Precontrast, Nephrographic and Excretory phases were acquired.  Intravenous contrast: 95 ml Omnipaque 350. 5 ml discarded.  Oral contrast: None.  Sagittal and coronal reformats were performed.    LIMITATION: Absence of enteric contrast limits evaluation of the GI tract.    FINDINGS:    LOWER CHEST: Bilateral lower lobe discoid atelectasis and mild left lower lobe groundglass density. Developing trace left pleural effusion. Trace pericardial effusion.    LIVER: Stable 1 cm cyst at the hepatic dome. Scattered subcentimeter hypodensities too small to characterize.  BILE DUCTS: Normal caliber.  GALLBLADDER: Multiple gallstones.  SPLEEN: Within normal limits.  PANCREAS: Within normal limits.  ADRENALS: Within normal limits.  KIDNEYS/URETERS: Mildly atrophic left kidney. Dominant 1.9 cm left upper pole renal calculus reidentified. Multiple stone fragments versus partial staghorn calculus in the right renal pelvis reidentified. 3.2 cm left upper pole renal mass measuring 26 Hounsfield units precontrast and 59 Hounsfield units postcontrast suspicious for solid neoplasm. 1.3 cm left lower pole renal cyst. Multiple subcentimeter bilateral hypodensities too small to characterize. Symmetrical nephrograms. No perinephric edema.    BLADDER: Minimal high density layering in the posterior urinary bladder midline consistent with small amount of hemorrhage, calculi or milk of calcium.  REPRODUCTIVE ORGANS: Mildly enlarged prostate measuring 2.4 x 4.9 cm.    BOWEL: No bowel obstruction. Appendix is not visualized and cannot be assessed.. Fluid-filled mildly distended colon from cecum to 2 distal descending colon measuring up to 6 cm in diameter. Formed stool in the sigmoid colon.. Diverticulosis of the transverse colon without diverticulitis.  PERITONEUM: Trace ascites in the left paracolic gutter  VESSELS: Within normal limits.  RETROPERITONEUM/LYMPH NODES: No lymphadenopathy.    ABDOMINAL WALL: Small bilateral fat-containing inguinal hernias. Trace fat-containing umbilical hernia.  BONES: Thoracolumbar degenerative changes.    IMPRESSION:     Absence of enteric contrast limits evaluation of the GI tract.     Enhancing left renal mass suspicious for solid neoplasm.    Left renal calculi reidentified. Small volume of high density in the bladder may represent small volume of hemorrhage, milk of calcium or calculi. In light of hematuria, recommend cystoscopy to exclude bladder pathology.    Ascending colitis.    Bilateral lower lobe discoid atelectasis and mild left lower lobe groundglass density. Developing trace left pleural effusion. Rule out developing pneumonia.    Trace ascites of unclear etiology.    Cholelithiasis.    Message left with floor nurse Meera, who is contacting Dr. Pritchett to return my call at 5/11/2020 2:28 PM with readback.                   ***Please see the addendum at the top of this report. It may contain additional important information or changes.****          RICCARDO LARA M.D., ATTENDING RADIOLOGIST  This document has been electronically signed. May 11 2020  2:31PM    < end of copied text >

## 2020-05-12 NOTE — PROGRESS NOTE ADULT - PROBLEM SELECTOR PLAN 1
nc ct ap w +gs, liver/bile ducts normal appearing  now resolved, suspect in setting of dec po  diet as tolerated, encouragement/assistance at meals

## 2020-05-12 NOTE — PROGRESS NOTE ADULT - SUBJECTIVE AND OBJECTIVE BOX
[facility] -- Lizzy Gonsales, Rosibel, Tara, Ayo Blanco Savella  Office # [phone]      Follow Up:    near syncope  Subjective/Observations:   No events overnight resting comfortably in bed.  No complaints of chest pain, dyspnea, or palpitations reported. No signs of orthopnea or PND. Feels very anxious today    REVIEW OF SYSTEMS: All other review of systems is negative unless indicated above    PAST MEDICAL & SURGICAL HISTORY:  No pertinent past medical history  History of vocal cord polypectomy  Benign parotid tumor: 1983  History of appendectomy: 1965      MEDICATIONS  (STANDING):  ARIPiprazole 2 milliGRAM(s) Oral daily  folic acid 1 milliGRAM(s) Oral daily  mirtazapine 30 milliGRAM(s) Oral at bedtime  pantoprazole    Tablet 40 milliGRAM(s) Oral before breakfast  PARoxetine 30 milliGRAM(s) Oral daily  polyethylene glycol 3350 17 Gram(s) Oral two times a day  rivaroxaban 15 milliGRAM(s) Oral two times a day with meals  senna 2 Tablet(s) Oral at bedtime    MEDICATIONS  (PRN):      Allergies    No Known Allergies    Intolerances        Vital Signs Last 24 Hrs  T(C): 36.6 (12 May 2020 06:15), Max: 36.6 (12 May 2020 06:15)  T(F): 97.8 (12 May 2020 06:15), Max: 97.8 (12 May 2020 06:15)  HR: 98 (12 May 2020 06:15) (92 - 98)  BP: 132/83 (12 May 2020 06:15) (107/71 - 132/83)  BP(mean): --  RR: 17 (12 May 2020 06:15) (16 - 17)  SpO2: 95% (12 May 2020 06:15) (95% - 98%)    I&O's Summary        PHYSICAL EXAM:  TELE: Off tele   Constitutional: NAD, awake and alert, well-developed  HEENT: Moist Mucous Membranes, Anicteric  Pulmonary: Non-labored, breath sounds are clear bilaterally, No wheezing, crackles or rhonchi  Cardiovascular: Regular, S1 and S2 nl, No murmurs, rubs, gallops or clicks  Gastrointestinal: Bowel Sounds present, soft, nontender.   Lymph: No lymphadenopathy. No peripheral edema.  Skin: No visible rashes or ulcers.  Psych:  Mood & affect appropriate    LABS: All Labs Reviewed:                        13.4   8.18  )-----------( 187      ( 11 May 2020 07:10 )             39.4                         12.8   8.67  )-----------( 155      ( 10 May 2020 11:02 )             36.4     11 May 2020 07:10    141    |  106    |  15     ----------------------------<  100    3.6     |  26     |  1.10   10 May 2020 11:02    140    |  107    |  14     ----------------------------<  109    3.1     |  26     |  1.10     Ca    8.3        11 May 2020 07:10  Ca    8.0        10 May 2020 11:02  Mg     2.3       11 May 2020 07:10    TPro  5.9    /  Alb  2.6    /  TBili  1.1    /  DBili  x      /  AST  18     /  ALT  18     /  AlkPhos  36     10 May 2020 11:02        12 Lead ECG:   Ventricular Rate 113 BPM  Atrial Rate 113 BPM  P-R Interval 130 ms  QRS Duration 72 ms  Q-T Interval 368 ms  QTC Calculation(Bezet) 504 ms  P Axis -1 degrees  R Axis 0 degrees  T Axis 0 degrees  Diagnosis Line Sinus tachycardia  Junctional ST depression, probably normal  Borderline ECG  No previous ECGs available  Confirmed by Aneudy Gleason MD ([id number]) on 5/8/2020 1:36:50 PM (05-07-20 @ 17:29)    < from: TTE Echo Complete w/o contrast w/ Doppler (05.10.20 @ 09:21) >  Dimensions:    LA 3.3       Normal Values: 2.0 - 4.0 cm    Ao 3.8        Normal Values: 2.0 - 3.8 cm  SEPTUM Normal Values: 0.6 - 1.2 cm  PWT Normal Values: 0.6 - 1.1 cm  LVIDd Normal Values: 3.0 - 5.6 cm  LVIDs Normal Values: 1.8 - 4.0 cm      OBSERVATIONS:  Technically difficult and incomplete study, unable to obtain most views  Mitral Valve: Grossly normal  Aortic Valve/Aorta: Not well-visualized  Tricuspid Valve: Not visualized.  Pulmonic Valve: Not visualized  Left Atrium: Grossly normal  Right Atrium: Not visualized  Left Ventricle: Grossly normal left ventricular function. Endocardium is not well-visualized, cannot rule out segmental dysfunction  Right Ventricle: Not visualized      Conclusion:   Technically difficult and incomplete study  Grossly normal left ventricular function  Right ventricle is not visualized  Unable to comment on valvular structure or pathology        < end of copied text >      < from: US Duplex Carotid Arteries Complete, Bilateral (05.08.20 @ 09:50) >  Interpretation: Mild plaque formation is noted at both internal carotid arteries.  Blood flow velocities (cm/sec): (Normal is less than 125)  Right: Proximal CCA=67  Distal CCA=76  Prox ICA=35 Distal ICA=49  ECA=50  ICA/CCA ratio=0.7 (Normal= less than 2)  Left: Proximal CCA=102    Distal CCA=70  Prox ICA=38  Distal ICA=50      ECA=78  ICA/CCA ratio=0.7 (Normal= less than 2)    The right vertebral artery shows antegrade flow. The left vertebral artery shows antegrade flow.  IMPRESSION:  All peak systolic and end diastolic velocities are within normal limits. No sonographic evidence of hemodynamically significant stenosis.    < end of copied text >    < from: CT Chest No Cont (05.07.20 @ 20:34) >  FINDINGS:  CHEST:   LUNGS AND LARGE AIRWAYS: Patent central airways. No pulmonary nodules. Mild bilateral lower lobe subsegmental atelectasis.  PLEURA: No pleural effusion.  VESSELS: Normal caliber aorta. Mild coronary artery calcification.  HEART: Heart size is normal. No pericardial effusion.  MEDIASTINUM AND SELVIN: No lymphadenopathy.  CHEST WALL AND LOWER NECK: Within normal limits.    ABDOMEN AND PELVIS:  LIVER: Within normal limits.  BILE DUCTS: Normal caliber.  GALLBLADDER: Multiple gallstones.  SPLEEN: Within normal limits.  PANCREAS: Within normal limits.  ADRENALS: Within normal limits.  KIDNEYS/URETERS: 1.9 cm nonobstructing left upper pole renal stone. Partial staghorn left renal calculus with multiple small stones versus fragmented larger stone in the left renal pelvis. No hydronephrosis. 2.7 cm hypodense lesion in the posterior aspect of the left kidney which is unable to be characterized on this unenhanced CT scan.  BLADDER: Within normal limits.  REPRODUCTIVE ORGANS: Prostate within normal limits.  BOWEL: No bowel obstruction. Appendix not identified. Mild colonic diverticulosis.  PERITONEUM: No ascites or hemoperitoneum.  VESSELS: Mild atherosclerotic arterial calcifications.  RETROPERITONEUM/LYMPH NODES: No lymphadenopathy.    ABDOMINAL WALL: Within normal limits.  BONES: No fractures.    IMPRESSION:   No traumatic injury.  Extensive nonobstructing left renal stones, including a partial staghorn calculus.    < end of copied text >    < from: CT Head No Cont (05.07.20 @ 20:32) >  IMPRESSION:    No acute intracranial hemorrhage or acute territorial infarct.     < end of copied text >

## 2020-05-12 NOTE — PROGRESS NOTE ADULT - ASSESSMENT
64/M without any significant known PMHx who presented to the ED c/o worsening weakness over the past 1 month with associated chills, and syncope, admitted for r/o COVID19 and syncope workup. Also found to have depression and suicidal, found to have right leg DVT probably provoked related to immobility, has been bridged to Xarelto     Syncope  - No evidence of arrythmia on tele or ECG. Denies dizziness or lightheadedness at present. Unlikely cardiac etiology   - Encourage PO hydration. Likely related to poor PO intake  - TTE was difficult and incomplete study with normal LV function, unable to evaluate valvular pathology   - Carotid doppler noted    DVT  - Per primary team  - Continue w/ rivaroxaban    Renal stone  - Patient had an episode where he went to bathroom overnight, had pain with urination and noted urine to be bloody, followed by [facility]  - Amado 2/2 renal stone  - Encourage hydration   - Urology consult pending     Depression   - Continue Remeron and Paxil    - Monitor and replete lytes, keep K>4, Mg>2.  - All other medical needs as per primary team.  - Other cardiovascular workup will depend on clinical course.  - Will continue to follow.  Aneudy Long DNP, ANP-c  Cardiology   Spectra #[id number]  [phone]

## 2020-05-12 NOTE — PROGRESS NOTE ADULT - SUBJECTIVE AND OBJECTIVE BOX
INTERVAL HPI/OVERNIGHT EVENTS:  pt seen and examined  denies n/v/abd pain/d  had bm yesterday, still feels like he could go more  ct reviewed    MEDICATIONS  (STANDING):  ARIPiprazole 2 milliGRAM(s) Oral daily  folic acid 1 milliGRAM(s) Oral daily  mirtazapine 30 milliGRAM(s) Oral at bedtime  pantoprazole    Tablet 40 milliGRAM(s) Oral before breakfast  PARoxetine 30 milliGRAM(s) Oral daily  polyethylene glycol 3350 17 Gram(s) Oral two times a day  rivaroxaban 15 milliGRAM(s) Oral two times a day with meals  senna 2 Tablet(s) Oral at bedtime    MEDICATIONS  (PRN):      Allergies    No Known Allergies    Intolerances        Review of Systems:    General:  No wt loss, fevers, chills, night sweats, fatigue   Eyes:  Good vision, no reported pain  ENT:  No sore throat, pain, runny nose, dysphagia  CV:  No pain, palpitations, hypo/hypertension  Resp:  No dyspnea, cough, tachypnea, wheezing  GI:  No pain, No nausea, No vomiting, No diarrhea, +constipation, No weight loss, No fever, No pruritis, No rectal bleeding, No melena, No dysphagia  :  No pain, bleeding, incontinence, nocturia  Muscle:  No pain, weakness  Neuro:  No weakness, tingling, memory problems  Psych:  No fatigue, insomnia, mood problems, depression  Endocrine:  No polyuria, polydypsia, cold/heat intolerance  Heme:  No petechiae, ecchymosis, easy bruisability  Skin:  No rash, tattoos, scars, edema      Vital Signs Last 24 Hrs  T(C): 36.6 (12 May 2020 06:15), Max: 36.6 (12 May 2020 06:15)  T(F): 97.8 (12 May 2020 06:15), Max: 97.8 (12 May 2020 06:15)  HR: 98 (12 May 2020 06:15) (92 - 98)  BP: 132/83 (12 May 2020 06:15) (107/71 - 132/83)  BP(mean): --  RR: 17 (12 May 2020 06:15) (16 - 17)  SpO2: 95% (12 May 2020 06:15) (95% - 98%)    PHYSICAL EXAM:    General:  nad  HEENT:  NC/AT  Abdomen:  Soft, non-tender, +dt  Extremities:  no edema  Neuro/Psych:  A&Ox3        LABS:                        13.4   8.18  )-----------( 187      ( 11 May 2020 07:10 )             39.4     05-11    141  |  106  |  15  ----------------------------<  100<H>  3.6   |  26  |  1.10    Ca    8.3<L>      11 May 2020 07:10  Mg     2.3     05-11    TPro  5.9<L>  /  Alb  2.6<L>  /  TBili  1.1  /  DBili  x   /  AST  18  /  ALT  18  /  AlkPhos  36<L>  05-10          RADIOLOGY & ADDITIONAL TESTS:  < from: CT Abdomen and Pelvis w/wo IV Cont (05.11.20 @ 13:56) >    EXAM:  CT ABDOMEN AND PELVIS WAW IC                            *** ADDENDUM 05/11/2020  ***    Findings discussed with Dr. Pritchett at 5/11/2020 2:36 PM with readback.      *** END OF ADDENDUM 05/11/2020  ***      PROCEDURE DATE:  05/11/2020          INTERPRETATION:  CLINICAL INDICATION: 64 years  Male with gross hematuria.     COMPARISON: 5/7/2020    PROCEDURE:   CT of the Abdomen and Pelvis was performed with and without intravenous contrast.   Precontrast, Nephrographic and Excretory phases were acquired.  Intravenous contrast: 95 ml Omnipaque 350. 5 ml discarded.  Oral contrast: None.  Sagittal and coronal reformats were performed.    LIMITATION: Absence of enteric contrast limits evaluation of the GI tract.    FINDINGS:    LOWER CHEST: Bilateral lower lobe discoid atelectasis and mild left lower lobe groundglass density. Developing trace left pleural effusion. Trace pericardial effusion.    LIVER: Stable 1 cm cyst at the hepatic dome. Scattered subcentimeter hypodensities too small to characterize.  BILE DUCTS: Normal caliber.  GALLBLADDER: Multiple gallstones.  SPLEEN: Within normal limits.  PANCREAS: Within normal limits.  ADRENALS: Within normal limits.  KIDNEYS/URETERS: Mildly atrophic left kidney. Dominant 1.9 cm left upper pole renal calculus reidentified. Multiple stone fragments versus partial staghorn calculus in the right renal pelvis reidentified. 3.2 cm left upper pole renal mass measuring 26 Hounsfield units precontrast and 59 Hounsfield units postcontrast suspicious for solid neoplasm. 1.3 cm left lower pole renal cyst. Multiple subcentimeter bilateral hypodensities too small to characterize. Symmetrical nephrograms. No perinephric edema.    BLADDER: Minimal high density layering in the posterior urinary bladder midline consistent with small amount of hemorrhage, calculi or milk of calcium.  REPRODUCTIVE ORGANS: Mildly enlarged prostate measuring 2.4 x 4.9 cm.    BOWEL: No bowel obstruction. Appendix is not visualized and cannot be assessed.. Fluid-filled mildly distended colon from cecum to 2 distal descending colon measuring up to 6 cm in diameter. Formed stool in the sigmoid colon.. Diverticulosis of the transverse colon without diverticulitis.  PERITONEUM: Trace ascites in the left paracolic gutter  VESSELS: Within normal limits.  RETROPERITONEUM/LYMPH NODES: No lymphadenopathy.    ABDOMINAL WALL: Small bilateral fat-containing inguinal hernias. Trace fat-containing umbilical hernia.  BONES: Thoracolumbar degenerative changes.    IMPRESSION:     Absence of enteric contrast limits evaluation of the GI tract.     Enhancing left renal mass suspicious for solid neoplasm.    Left renal calculi reidentified. Small volume of high density in the bladder may represent small volume of hemorrhage, milk of calcium or calculi. In light of hematuria, recommend cystoscopy to exclude bladder pathology.    Ascending colitis.    Bilateral lower lobe discoid atelectasis and mild left lower lobe groundglass density. Developing trace left pleural effusion. Rule out developing pneumonia.    Trace ascites of unclear etiology.    Cholelithiasis.    Message left with floor nurse Meera, who is contacting Dr. Pritchett to return my call at 5/11/2020 2:28 PM with readback.                   ***Please see the addendum at the top of this report. It may contain additional important information or changes.****          RICCARDO LARA M.D., ATTENDING RADIOLOGIST  This document has been electronically signed. May 11 2020  2:31PM  Addend:  RICCARDO LARA M.D., ATTENDING RADIOLOGIST  This addendum was electronically signed on: May 11 2020  2:36PM.          < end of copied text > INTERVAL HPI/OVERNIGHT EVENTS:  pt seen and examined  denies n/v/abd pain/d  had bm yesterday, still feels like he could go more  ct reviewed- ? colitis, suspicious for renal mass    MEDICATIONS  (STANDING):  ARIPiprazole 2 milliGRAM(s) Oral daily  folic acid 1 milliGRAM(s) Oral daily  mirtazapine 30 milliGRAM(s) Oral at bedtime  pantoprazole    Tablet 40 milliGRAM(s) Oral before breakfast  PARoxetine 30 milliGRAM(s) Oral daily  polyethylene glycol 3350 17 Gram(s) Oral two times a day  rivaroxaban 15 milliGRAM(s) Oral two times a day with meals  senna 2 Tablet(s) Oral at bedtime    MEDICATIONS  (PRN):      Allergies    No Known Allergies    Intolerances        Review of Systems:    General:  No wt loss, fevers, chills, night sweats, fatigue   Eyes:  Good vision, no reported pain  ENT:  No sore throat, pain, runny nose, dysphagia  CV:  No pain, palpitations, hypo/hypertension  Resp:  No dyspnea, cough, tachypnea, wheezing  GI:  No pain, No nausea, No vomiting, No diarrhea, +constipation, No weight loss, No fever, No pruritis, No rectal bleeding, No melena, No dysphagia  :  No pain, bleeding, incontinence, nocturia  Muscle:  No pain, weakness  Neuro:  No weakness, tingling, memory problems  Psych:  No fatigue, insomnia, mood problems, depression  Endocrine:  No polyuria, polydypsia, cold/heat intolerance  Heme:  No petechiae, ecchymosis, easy bruisability  Skin:  No rash, tattoos, scars, edema      Vital Signs Last 24 Hrs  T(C): 36.6 (12 May 2020 06:15), Max: 36.6 (12 May 2020 06:15)  T(F): 97.8 (12 May 2020 06:15), Max: 97.8 (12 May 2020 06:15)  HR: 98 (12 May 2020 06:15) (92 - 98)  BP: 132/83 (12 May 2020 06:15) (107/71 - 132/83)  BP(mean): --  RR: 17 (12 May 2020 06:15) (16 - 17)  SpO2: 95% (12 May 2020 06:15) (95% - 98%)    PHYSICAL EXAM:    General:  nad  HEENT:  NC/AT  Abdomen:  Soft, non-tender, +dt  Extremities:  no edema  Neuro/Psych:  A&Ox3        LABS:                        13.4   8.18  )-----------( 187      ( 11 May 2020 07:10 )             39.4     05-11    141  |  106  |  15  ----------------------------<  100<H>  3.6   |  26  |  1.10    Ca    8.3<L>      11 May 2020 07:10  Mg     2.3     05-11    TPro  5.9<L>  /  Alb  2.6<L>  /  TBili  1.1  /  DBili  x   /  AST  18  /  ALT  18  /  AlkPhos  36<L>  05-10          RADIOLOGY & ADDITIONAL TESTS:  < from: CT Abdomen and Pelvis w/wo IV Cont (05.11.20 @ 13:56) >    EXAM:  CT ABDOMEN AND PELVIS WAW IC                            *** ADDENDUM 05/11/2020  ***    Findings discussed with Dr. Pritchett at 5/11/2020 2:36 PM with readback.      *** END OF ADDENDUM 05/11/2020  ***      PROCEDURE DATE:  05/11/2020          INTERPRETATION:  CLINICAL INDICATION: 64 years  Male with gross hematuria.     COMPARISON: 5/7/2020    PROCEDURE:   CT of the Abdomen and Pelvis was performed with and without intravenous contrast.   Precontrast, Nephrographic and Excretory phases were acquired.  Intravenous contrast: 95 ml Omnipaque 350. 5 ml discarded.  Oral contrast: None.  Sagittal and coronal reformats were performed.    LIMITATION: Absence of enteric contrast limits evaluation of the GI tract.    FINDINGS:    LOWER CHEST: Bilateral lower lobe discoid atelectasis and mild left lower lobe groundglass density. Developing trace left pleural effusion. Trace pericardial effusion.    LIVER: Stable 1 cm cyst at the hepatic dome. Scattered subcentimeter hypodensities too small to characterize.  BILE DUCTS: Normal caliber.  GALLBLADDER: Multiple gallstones.  SPLEEN: Within normal limits.  PANCREAS: Within normal limits.  ADRENALS: Within normal limits.  KIDNEYS/URETERS: Mildly atrophic left kidney. Dominant 1.9 cm left upper pole renal calculus reidentified. Multiple stone fragments versus partial staghorn calculus in the right renal pelvis reidentified. 3.2 cm left upper pole renal mass measuring 26 Hounsfield units precontrast and 59 Hounsfield units postcontrast suspicious for solid neoplasm. 1.3 cm left lower pole renal cyst. Multiple subcentimeter bilateral hypodensities too small to characterize. Symmetrical nephrograms. No perinephric edema.    BLADDER: Minimal high density layering in the posterior urinary bladder midline consistent with small amount of hemorrhage, calculi or milk of calcium.  REPRODUCTIVE ORGANS: Mildly enlarged prostate measuring 2.4 x 4.9 cm.    BOWEL: No bowel obstruction. Appendix is not visualized and cannot be assessed.. Fluid-filled mildly distended colon from cecum to 2 distal descending colon measuring up to 6 cm in diameter. Formed stool in the sigmoid colon.. Diverticulosis of the transverse colon without diverticulitis.  PERITONEUM: Trace ascites in the left paracolic gutter  VESSELS: Within normal limits.  RETROPERITONEUM/LYMPH NODES: No lymphadenopathy.    ABDOMINAL WALL: Small bilateral fat-containing inguinal hernias. Trace fat-containing umbilical hernia.  BONES: Thoracolumbar degenerative changes.    IMPRESSION:     Absence of enteric contrast limits evaluation of the GI tract.     Enhancing left renal mass suspicious for solid neoplasm.    Left renal calculi reidentified. Small volume of high density in the bladder may represent small volume of hemorrhage, milk of calcium or calculi. In light of hematuria, recommend cystoscopy to exclude bladder pathology.    Ascending colitis.    Bilateral lower lobe discoid atelectasis and mild left lower lobe groundglass density. Developing trace left pleural effusion. Rule out developing pneumonia.    Trace ascites of unclear etiology.    Cholelithiasis.    Message left with floor nurse Meera, who is contacting Dr. Pritchett to return my call at 5/11/2020 2:28 PM with readback.                   ***Please see the addendum at the top of this report. It may contain additional important information or changes.****          RICCARDO LARA M.D., ATTENDING RADIOLOGIST  This document has been electronically signed. May 11 2020  2:31PM  Addend:  RICCARDO LARA M.D., ATTENDING RADIOLOGIST  This addendum was electronically signed on: May 11 2020  2:36PM.          < end of copied text >

## 2020-05-12 NOTE — PROGRESS NOTE ADULT - PROBLEM SELECTOR PLAN 10
-Discussed disposition with patients brother in law. Pt is currently living in an unsafe environment with no support system. Brother in law is in process of cleaning house and ordering furniture.   -Pt may require inpatient psych after hospitalization. F/u psych reccs

## 2020-05-12 NOTE — PROGRESS NOTE ADULT - PROBLEM SELECTOR PLAN 8
Patient found to have plantar wart on the plantar surface of the right 3rd toe  -Likely chronic. No other obvious verrucous lesions appreciated  -Podiatry reccs appreciated

## 2020-05-12 NOTE — PROGRESS NOTE ADULT - PROBLEM SELECTOR PLAN 5
Psych follow up appreciated  Meds adjusted: Added Abilify. Remeron and Paxil  Will monitor for improvement, Dr. Conley to decide about inpatient psych admission  Denies suicidal ideation

## 2020-05-12 NOTE — PROGRESS NOTE ADULT - PROBLEM SELECTOR PLAN 7
Patient noted to have overgrown toenails of b/l feet, thick and discolored, c/w onychomycosis  -Podiatry reccs appreicated

## 2020-05-12 NOTE — PROGRESS NOTE ADULT - PROBLEM SELECTOR PLAN 2
improved  ct noted- no abd pain/diarrhea, doubt colitis  give smog x 1  cont bowel regimen  monitor exam/gi fxn  op colon

## 2020-05-12 NOTE — PROGRESS NOTE ADULT - PROBLEM SELECTOR PLAN 3
- episode of hematuria overnight. CT abdomen significant for L renal stones and partial staghorn calculus   - Patient otherwise asymptomatic  - Noted heme/onc eval note -- currently active DVT w/ family hx suspicious of underlying hypercoag etiology, will need outpatient follow up   - will continue xarelto at present and monitor closely for hematuria  - monitor H/H  - urology consulted - Dr. Sibley, recs appreciated,  -CT pre and post contrast- Enhancing left renal mass suspicious for solid neoplasm.   - US testicle ordered to r/o scrotal mass   Will eventually need referal to tertiary center as o/p for percutaneous nephrolithotomy.  D/w Dr. Montgomery , Ok to continue Xarelto - episode of hematuria CT abdomen significant for L renal stones and partial staghorn calculus initially but repeat -CT pre and post contrast- Enhancing left renal mass suspicious for solid neoplasm.   - Urology follow up requested   - Noted heme/onc eval note -- currently active DVT w/ family hx suspicious of underlying hypercoag etiology, will need outpatient follow up   - will continue xarelto at present and monitor closely for hematuria  - monitor H/H  - urology consult   Will eventually need referal to tertiary center as o/p for percutaneous nephrolithotomy per   D/w Dr. Valentina FONTANA, Ok to continue Xarelto

## 2020-05-12 NOTE — PROGRESS NOTE BEHAVIORAL HEALTH - NSBHCHARTREVIEWLAB_PSY_A_CORE FT
13.4   8.18  )-----------( 187      ( 11 May 2020 07:10 )             39.4   05-11    141  |  106  |  15  ----------------------------<  100<H>  3.6   |  26  |  1.10    Ca    8.3<L>      11 May 2020 07:10  Mg     2.3     05-11

## 2020-05-12 NOTE — PROGRESS NOTE ADULT - PROBLEM SELECTOR PLAN 1
[facility] [address], [facility] [address]  [facility] [address]      Kaiden Lutz :2010 MRN:[id number]      2018 Time Session Began: 16:00  Time Session Ended: 16:45    Session Type:  45 minute therapy session (18228)    Others Present: Patient's mother present for session    Intervention: Behavioral, Cognitive, Supportive, Systemic    Suicide/Homicide/Violence Ideation: No    If Yes, explain: None    Current Outpatient Prescriptions   Medication Sig   • amoxicillin (AMOXIL) 400 MG/5ML suspension Take 10 ml every 12 hours for 10 days.     No current facility-administered medications for this visit.        Change in Medication(s) Reported: No  If Yes, explain:     Patient/Family Education Provided: Yes  Patient/Family Displays Understanding: Yes    If No, explain:     Chief complaint in patient's own words: \" Things that make me cry, sad \"    Progress Note containing chief complaint and symptoms/problems related to the complaint:    D: Patient discussed situations that negatively effect mood or make him \"sad,\" especially interactions with peers, disappointment, and his sister \"being mean.\"  Patient's mother expressed concerns regarding patient self-esteem and frequently seeking reassurance and validation.  Patient and mother also discussed patient recent losses and grief concerns.  A: Processed patient's feelings regarding triggers and recent losses.  Assisted patient in identifying contributing factors to feeling \"sad\" and validated patient's feelings of loss regarding recent losses.  Provided education regarding manifestations of anxiety and grief in children.  Worked collaboratively with patient and parent to identify goals for treatment and develop treatment plan.  R: Patient was open and engaging with writer and presented with full-range affect, and mood ranging from dysphoric to euthymic.  Patient demonstrated good insight  into feelings and contributing factors to feelings but struggled at times to clearly articulate effect of these factors on behavior.  Patient was receptive to education, homework and feedback and was readily able to identify goals for treatment.  P: Cognitive Behavioral Therapy, Dialectical Behavioral Therapy.  Patient to return in 3-4 weeks for follow up.  Need for Community Resources Assessed: Yes    Resources Needed: No    If Yes, what resources:     Diagnosis: F41.1 JAZMINE (generalized anxiety disorder)  (primary encounter diagnosis)  Z63.4 Uncomplicated bereavement    Treatment Plan: Treatment Plan established this visit    Discharge Plan: Strategies Discussed to Maintain Gains    Next Appointment: 3/6/2018  Anita Amado LCSW   Suspect due to dehydration and poor oral intake due to depression. Patient presenting with worsening weakness, fatigue, over the past 1 month with 2 admitted falls in his home approx 2 weeks prior to admission with associated LOC and preceding dizziness.   -Patient attributes to Poor PO intake, feels like he does not have significant appetite   - encourage PO hydration  -CT head negative for ICH or gross structural abnormality, CT cspine without acute fracture  -Patient without any outpatient followup ~ 30 years, likely with some chronic unaddressed issues  - Carotid doppler negative  -TTE result pending   - Neuro, cardio consults appreciated

## 2020-05-12 NOTE — PROGRESS NOTE BEHAVIORAL HEALTH - NSBHFUPINTERVALHXFT_PSY_A_CORE
Patient seen, evaluated and chart reviewed. Patient reports compliance with treatment, no side-effects are reported. Patient reports continued worsening mood with poor appetite and worsened concentration. He admits to being overwhelmed by the medical situation, and he is anxious about possible procedure on his kidney stone.

## 2020-05-12 NOTE — PROGRESS NOTE BEHAVIORAL HEALTH - NSBHCHARTREVIEWVS_PSY_A_CORE FT
Vital Signs Last 24 Hrs  T(C): 36.4 (12 May 2020 12:32), Max: 36.6 (12 May 2020 06:15)  T(F): 97.6 (12 May 2020 12:32), Max: 97.8 (12 May 2020 06:15)  HR: 100 (12 May 2020 12:32) (97 - 100)  BP: 126/80 (12 May 2020 12:32) (113/74 - 132/83)  BP(mean): --  RR: 18 (12 May 2020 12:32) (17 - 18)  SpO2: 94% (12 May 2020 12:32) (94% - 95%)

## 2020-05-12 NOTE — PROGRESS NOTE ADULT - SUBJECTIVE AND OBJECTIVE BOX
Neurology follow up note    GARDENIA [id number]      Interval History:    Patient feels sad    MEDICATIONS    ARIPiprazole 2 milliGRAM(s) Oral daily  folic acid 1 milliGRAM(s) Oral daily  mirtazapine 30 milliGRAM(s) Oral at bedtime  pantoprazole    Tablet 40 milliGRAM(s) Oral before breakfast  PARoxetine 30 milliGRAM(s) Oral daily  polyethylene glycol 3350 17 Gram(s) Oral two times a day  rivaroxaban 15 milliGRAM(s) Oral two times a day with meals  senna 2 Tablet(s) Oral at bedtime      Allergies    No Known Allergies    Intolerances            Vital Signs Last 24 Hrs  T(C): 36.6 (12 May 2020 06:15), Max: 36.6 (12 May 2020 06:15)  T(F): 97.8 (12 May 2020 06:15), Max: 97.8 (12 May 2020 06:15)  HR: 98 (12 May 2020 06:15) (92 - 98)  BP: 132/83 (12 May 2020 06:15) (107/71 - 132/83)  BP(mean): --  RR: 17 (12 May 2020 06:15) (16 - 17)  SpO2: 95% (12 May 2020 06:15) (95% - 98%)      REVIEW OF SYSTEMS:  Constitutional:  The patient denies fever, chills, or night sweats at present.  Head:  No headaches.  Eyes:  No double vision or blurry vision.  Ears:  No ringing in the ears.  Neck:  No neck pain.  Respiratory:  No shortness of breath.  Cardiovascular:  No chest pain.  Abdomen:  No nausea, vomiting, or abdominal pain.  Extremities/Neurological:  No numbness or tingling.  Musculoskeletal:  No joint pain.  General:  Positive history for the last one month of lethargy, generalized weakness, fatigue, episode of loss of consciousness.    PHYSICAL EXAMINATION:  HEENT:  Head:  Normocephalic, atraumatic.  Eyes:  No scleral icterus.  Ears:  Hearing bilaterally intact.  NECK:  Supple.  RESPIRATORY:  Good air entry bilaterally.  CARDIOVASCULAR:  S1 and S2 heard.  ABDOMEN:  Soft and nontender.  EXTREMITIES:  No clubbing or cyanosis were noted.      NEUROLOGIC:  The patient is awake and alert.  Extraocular movements were intact.  Pupils were equal, round, and reactive bilaterally 3 mm to 2 mm.  Speech was fluent.  Smile was symmetric.  Motor:  Bilateral upper and lower were 5/5.  Sensory:  Bilateral upper and lower intact to light touch.                 LABS:  CBC Full  -  ( 11 May 2020 07:10 )  WBC Count : 8.18 K/uL  RBC Count : 4.42 M/uL  Hemoglobin : 13.4 g/dL  Hematocrit : 39.4 %  Platelet Count - Automated : 187 K/uL  Mean Cell Volume : 89.1 fl  Mean Cell Hemoglobin : 30.3 pg  Mean Cell Hemoglobin Concentration : 34.0 gm/dL  Auto Neutrophil # : x  Auto Lymphocyte # : x  Auto Monocyte # : x  Auto Eosinophil # : x  Auto Basophil # : x  Auto Neutrophil % : x  Auto Lymphocyte % : x  Auto Monocyte % : x  Auto Eosinophil % : x  Auto Basophil % : x      05-11    141  |  106  |  15  ----------------------------<  100<H>  3.6   |  26  |  1.10    Ca    8.3<L>      11 May 2020 07:10  Mg     2.3     05-11      Hemoglobin A1C:       Vitamin B12         RADIOLOGY    ANALYSIS AND PLAN:  This is a 64-year-old with generalized paresis, episode of loss of consciousness.  1.	For generalized paresis, suspect this could be secondary to failure to thrive, poor oral intake.  I doubt that this is from a primary CNS event  2.	For episode of loss of consciousness, suspect most likely this was a syncopal event.  No clear history to suggest underlying epilepsy.  No sign on examination to suggest cerebrovascular accident has ensued.  3.	I would recommend Physical Therapy evaluation.  4.	Fall precautions.  5.	psych evaluation   6.	Monitor oral intake.  7.	Greater than 30 minutes of time was spent with the patient, plan of care, reviewing data, speaking to the multidisciplinary healthcare team.

## 2020-05-13 LAB
ANION GAP SERPL CALC-SCNC: 9 MMOL/L — SIGNIFICANT CHANGE UP (ref 5–17)
BUN SERPL-MCNC: 20 MG/DL — SIGNIFICANT CHANGE UP (ref 7–23)
CALCIUM SERPL-MCNC: 8.5 MG/DL — SIGNIFICANT CHANGE UP (ref 8.5–10.1)
CHLORIDE SERPL-SCNC: 108 MMOL/L — SIGNIFICANT CHANGE UP (ref 96–108)
CO2 SERPL-SCNC: 26 MMOL/L — SIGNIFICANT CHANGE UP (ref 22–31)
CREAT SERPL-MCNC: 1.2 MG/DL — SIGNIFICANT CHANGE UP (ref 0.5–1.3)
GLUCOSE SERPL-MCNC: 107 MG/DL — HIGH (ref 70–99)
HCT VFR BLD CALC: 41.5 % — SIGNIFICANT CHANGE UP (ref 39–50)
HGB BLD-MCNC: 14 G/DL — SIGNIFICANT CHANGE UP (ref 13–17)
MCHC RBC-ENTMCNC: 29.9 PG — SIGNIFICANT CHANGE UP (ref 27–34)
MCHC RBC-ENTMCNC: 33.7 GM/DL — SIGNIFICANT CHANGE UP (ref 32–36)
MCV RBC AUTO: 88.7 FL — SIGNIFICANT CHANGE UP (ref 80–100)
NRBC # BLD: 0 /100 WBCS — SIGNIFICANT CHANGE UP (ref 0–0)
PLATELET # BLD AUTO: 262 K/UL — SIGNIFICANT CHANGE UP (ref 150–400)
POTASSIUM SERPL-MCNC: 4 MMOL/L — SIGNIFICANT CHANGE UP (ref 3.5–5.3)
POTASSIUM SERPL-SCNC: 4 MMOL/L — SIGNIFICANT CHANGE UP (ref 3.5–5.3)
RBC # BLD: 4.68 M/UL — SIGNIFICANT CHANGE UP (ref 4.2–5.8)
RBC # FLD: 13.1 % — SIGNIFICANT CHANGE UP (ref 10.3–14.5)
SODIUM SERPL-SCNC: 143 MMOL/L — SIGNIFICANT CHANGE UP (ref 135–145)
WBC # BLD: 6.91 K/UL — SIGNIFICANT CHANGE UP (ref 3.8–10.5)
WBC # FLD AUTO: 6.91 K/UL — SIGNIFICANT CHANGE UP (ref 3.8–10.5)

## 2020-05-13 PROCEDURE — 99233 SBSQ HOSP IP/OBS HIGH 50: CPT

## 2020-05-13 PROCEDURE — 99232 SBSQ HOSP IP/OBS MODERATE 35: CPT

## 2020-05-13 RX ADMIN — ARIPIPRAZOLE 2 MILLIGRAM(S): 15 TABLET ORAL at 11:07

## 2020-05-13 RX ADMIN — RIVAROXABAN 15 MILLIGRAM(S): KIT at 08:34

## 2020-05-13 RX ADMIN — Medication 1 MILLIGRAM(S): at 11:07

## 2020-05-13 RX ADMIN — Medication 30 MILLIGRAM(S): at 11:07

## 2020-05-13 RX ADMIN — RIVAROXABAN 15 MILLIGRAM(S): KIT at 17:33

## 2020-05-13 NOTE — SWALLOW BEDSIDE ASSESSMENT ADULT - SWALLOW EVAL: DIAGNOSIS
Pt self-administered PO trials of thin liquids and regular solids. Pt p/w 1. Functional oral phase marked by adequate retrieval and containment, timely mastication/manipulation and transfer, and adequate oral clearance post swallow. 2. Functional pharyngeal phase marked by suspected timely swallow onset, +laryngeal elevation to palpation, and no overt s/sx of aspiration. 3. Recommend pt resume PO diet of regular solids with thin liquids +aspiration precautions. Discussed with Dr. Leonardo, if there is concern for aspiration, can consider objective assessment. Pt would benefit from meds crushed in applesauce given reported difficulty coordinating whole pills and liquids.

## 2020-05-13 NOTE — PROGRESS NOTE BEHAVIORAL HEALTH - NSBHCHARTREVIEWLAB_PSY_A_CORE FT
14.0   6.91  )-----------( 262      ( 13 May 2020 07:15 )             41.5   05-13    143  |  108  |  20  ----------------------------<  107<H>  4.0   |  26  |  1.20    Ca    8.5      13 May 2020 07:15

## 2020-05-13 NOTE — PROGRESS NOTE ADULT - SUBJECTIVE AND OBJECTIVE BOX
INTERVAL HPI/OVERNIGHT EVENTS:  pt seen and examined  denies n/v/abd pain      MEDICATIONS  (STANDING):  ARIPiprazole 2 milliGRAM(s) Oral daily  folic acid 1 milliGRAM(s) Oral daily  mirtazapine 30 milliGRAM(s) Oral at bedtime  pantoprazole    Tablet 40 milliGRAM(s) Oral before breakfast  PARoxetine 30 milliGRAM(s) Oral daily  polyethylene glycol 3350 17 Gram(s) Oral two times a day  rivaroxaban 15 milliGRAM(s) Oral two times a day with meals  senna 2 Tablet(s) Oral at bedtime    MEDICATIONS  (PRN):      Allergies    No Known Allergies    Intolerances        Review of Systems:    General:  No wt loss, fevers, chills, night sweats, fatigue   Eyes:  Good vision, no reported pain  ENT:  No sore throat, pain, runny nose, dysphagia  CV:  No pain, palpitations, hypo/hypertension  Resp:  No dyspnea, cough, tachypnea, wheezing  GI:  No pain, No nausea, No vomiting, No diarrhea, +constipation, No weight loss, No fever, No pruritis, No rectal bleeding, No melena, No dysphagia  :  No pain, bleeding, incontinence, nocturia  Muscle:  No pain, weakness  Neuro:  No weakness, tingling, memory problems  Psych:  No fatigue, insomnia, mood problems, depression  Endocrine:  No polyuria, polydypsia, cold/heat intolerance  Heme:  No petechiae, ecchymosis, easy bruisability  Skin:  No rash, tattoos, scars, edema      Vital Signs Last 24 Hrs  T(C): 36.6 (12 May 2020 06:15), Max: 36.6 (12 May 2020 06:15)  T(F): 97.8 (12 May 2020 06:15), Max: 97.8 (12 May 2020 06:15)  HR: 98 (12 May 2020 06:15) (92 - 98)  BP: 132/83 (12 May 2020 06:15) (107/71 - 132/83)  BP(mean): --  RR: 17 (12 May 2020 06:15) (16 - 17)  SpO2: 95% (12 May 2020 06:15) (95% - 98%)    PHYSICAL EXAM:    General:  nad  HEENT:  NC/AT  Abdomen:  Soft, non-tender, +dt  Extremities:  no edema  Neuro/Psych:  A&Ox3        LABS:                        13.4   8.18  )-----------( 187      ( 11 May 2020 07:10 )             39.4     05-11    141  |  106  |  15  ----------------------------<  100<H>  3.6   |  26  |  1.10    Ca    8.3<L>      11 May 2020 07:10  Mg     2.3     05-11    TPro  5.9<L>  /  Alb  2.6<L>  /  TBili  1.1  /  DBili  x   /  AST  18  /  ALT  18  /  AlkPhos  36<L>  05-10          RADIOLOGY & ADDITIONAL TESTS:  < from: CT Abdomen and Pelvis w/wo IV Cont (05.11.20 @ 13:56) >    EXAM:  CT ABDOMEN AND PELVIS WAW IC                            *** ADDENDUM 05/11/2020  ***    Findings discussed with Dr. Pritchett at 5/11/2020 2:36 PM with readback.      *** END OF ADDENDUM 05/11/2020  ***      PROCEDURE DATE:  05/11/2020          INTERPRETATION:  CLINICAL INDICATION: 64 years  Male with gross hematuria.     COMPARISON: 5/7/2020    PROCEDURE:   CT of the Abdomen and Pelvis was performed with and without intravenous contrast.   Precontrast, Nephrographic and Excretory phases were acquired.  Intravenous contrast: 95 ml Omnipaque 350. 5 ml discarded.  Oral contrast: None.  Sagittal and coronal reformats were performed.    LIMITATION: Absence of enteric contrast limits evaluation of the GI tract.    FINDINGS:    LOWER CHEST: Bilateral lower lobe discoid atelectasis and mild left lower lobe groundglass density. Developing trace left pleural effusion. Trace pericardial effusion.    LIVER: Stable 1 cm cyst at the hepatic dome. Scattered subcentimeter hypodensities too small to characterize.  BILE DUCTS: Normal caliber.  GALLBLADDER: Multiple gallstones.  SPLEEN: Within normal limits.  PANCREAS: Within normal limits.  ADRENALS: Within normal limits.  KIDNEYS/URETERS: Mildly atrophic left kidney. Dominant 1.9 cm left upper pole renal calculus reidentified. Multiple stone fragments versus partial staghorn calculus in the right renal pelvis reidentified. 3.2 cm left upper pole renal mass measuring 26 Hounsfield units precontrast and 59 Hounsfield units postcontrast suspicious for solid neoplasm. 1.3 cm left lower pole renal cyst. Multiple subcentimeter bilateral hypodensities too small to characterize. Symmetrical nephrograms. No perinephric edema.    BLADDER: Minimal high density layering in the posterior urinary bladder midline consistent with small amount of hemorrhage, calculi or milk of calcium.  REPRODUCTIVE ORGANS: Mildly enlarged prostate measuring 2.4 x 4.9 cm.    BOWEL: No bowel obstruction. Appendix is not visualized and cannot be assessed.. Fluid-filled mildly distended colon from cecum to 2 distal descending colon measuring up to 6 cm in diameter. Formed stool in the sigmoid colon.. Diverticulosis of the transverse colon without diverticulitis.  PERITONEUM: Trace ascites in the left paracolic gutter  VESSELS: Within normal limits.  RETROPERITONEUM/LYMPH NODES: No lymphadenopathy.    ABDOMINAL WALL: Small bilateral fat-containing inguinal hernias. Trace fat-containing umbilical hernia.  BONES: Thoracolumbar degenerative changes.    IMPRESSION:     Absence of enteric contrast limits evaluation of the GI tract.     Enhancing left renal mass suspicious for solid neoplasm.    Left renal calculi reidentified. Small volume of high density in the bladder may represent small volume of hemorrhage, milk of calcium or calculi. In light of hematuria, recommend cystoscopy to exclude bladder pathology.    Ascending colitis.    Bilateral lower lobe discoid atelectasis and mild left lower lobe groundglass density. Developing trace left pleural effusion. Rule out developing pneumonia.    Trace ascites of unclear etiology.    Cholelithiasis.    Message left with floor nurse Meera, who is contacting Dr. Pritchett to return my call at 5/11/2020 2:28 PM with readback.                   ***Please see the addendum at the top of this report. It may contain additional important information or changes.****          RICCARDO LARA M.D., ATTENDING RADIOLOGIST  This document has been electronically signed. May 11 2020  2:31PM  Addend:  RICCARDO LARA M.D., ATTENDING RADIOLOGIST  This addendum was electronically signed on: May 11 2020  2:36PM.          < end of copied text >

## 2020-05-13 NOTE — PROGRESS NOTE ADULT - PROBLEM SELECTOR PLAN 1
Suspect due to dehydration and poor oral intake due to depression. Patient presenting with worsening weakness, fatigue, over the past 1 month with 2 admitted falls in his home approx 2 weeks prior to admission with associated LOC and preceding dizziness.   -Patient attributes to Poor PO intake, feels like he does not have significant appetite   - encourage PO hydration  -CT head negative for ICH or gross structural abnormality, CT cspine without acute fracture  -Patient without any outpatient followup ~ 30 years, likely with some chronic unaddressed issues  - Carotid doppler negative  -TTE result pending   - Neuro, cardio consults appreciated

## 2020-05-13 NOTE — SWALLOW BEDSIDE ASSESSMENT ADULT - ASR SWALLOW LINGUAL MOBILITY
Other (Free Text): Patient to follow up in 4 weeks to check for clearance. Note Text (......Xxx Chief Complaint.): This diagnosis correlates with the Detail Level: Detailed within functional limits

## 2020-05-13 NOTE — PROGRESS NOTE ADULT - SUBJECTIVE AND OBJECTIVE BOX
Neurology follow up note    GARDENIA [id number]      Interval History:    Patient feels ok no new complaints.    MEDICATIONS    ARIPiprazole 2 milliGRAM(s) Oral daily  folic acid 1 milliGRAM(s) Oral daily  mirtazapine 30 milliGRAM(s) Oral at bedtime  pantoprazole    Tablet 40 milliGRAM(s) Oral before breakfast  PARoxetine 30 milliGRAM(s) Oral daily  polyethylene glycol 3350 17 Gram(s) Oral two times a day  rivaroxaban 15 milliGRAM(s) Oral two times a day with meals  senna 2 Tablet(s) Oral at bedtime      Allergies    No Known Allergies    Intolerances            Vital Signs Last 24 Hrs  T(C): 36.2 (13 May 2020 12:11), Max: 36.7 (13 May 2020 04:33)  T(F): 97.2 (13 May 2020 12:11), Max: 98.1 (13 May 2020 04:33)  HR: 92 (13 May 2020 12:11) (90 - 105)  BP: 101/63 (13 May 2020 12:11) (100/70 - 124/82)  BP(mean): --  RR: 18 (13 May 2020 12:11) (17 - 18)  SpO2: 96% (13 May 2020 12:11) (95% - 96%)          REVIEW OF SYSTEMS:  Constitutional:  The patient denies fever, chills, or night sweats at present.  Head:  No headaches.  Eyes:  No double vision or blurry vision.  Ears:  No ringing in the ears.  Neck:  No neck pain.  Respiratory:  No shortness of breath.  Cardiovascular:  No chest pain.  Abdomen:  No nausea, vomiting, or abdominal pain.  Extremities/Neurological:  No numbness or tingling.  Musculoskeletal:  No joint pain.  General:  Positive history for the last one month of lethargy, generalized weakness, fatigue, episode of loss of consciousness.    PHYSICAL EXAMINATION:  HEENT:  Head:  Normocephalic, atraumatic.  Eyes:  No scleral icterus.  Ears:  Hearing bilaterally intact.  NECK:  Supple.  RESPIRATORY:  Good air entry bilaterally.  CARDIOVASCULAR:  S1 and S2 heard.  ABDOMEN:  Soft and nontender.  EXTREMITIES:  No clubbing or cyanosis were noted.      NEUROLOGIC:  The patient is awake and alert.  Extraocular movements were intact.  Pupils were equal, round, and reactive bilaterally 3 mm to 2 mm.  Speech was fluent.  Smile was symmetric.  Motor:  Bilateral upper and lower were 5/5.  Sensory:  Bilateral upper and lower intact to light touch.                  LABS:  CBC Full  -  ( 13 May 2020 07:15 )  WBC Count : 6.91 K/uL  RBC Count : 4.68 M/uL  Hemoglobin : 14.0 g/dL  Hematocrit : 41.5 %  Platelet Count - Automated : 262 K/uL  Mean Cell Volume : 88.7 fl  Mean Cell Hemoglobin : 29.9 pg  Mean Cell Hemoglobin Concentration : 33.7 gm/dL  Auto Neutrophil # : x  Auto Lymphocyte # : x  Auto Monocyte # : x  Auto Eosinophil # : x  Auto Basophil # : x  Auto Neutrophil % : x  Auto Lymphocyte % : x  Auto Monocyte % : x  Auto Eosinophil % : x  Auto Basophil % : x      05-13    143  |  108  |  20  ----------------------------<  107<H>  4.0   |  26  |  1.20    Ca    8.5      13 May 2020 07:15      Hemoglobin A1C:       Vitamin B12         RADIOLOGY    ANALYSIS AND PLAN:  This is a 64-year-old with generalized paresis, episode of loss of consciousness.  1.	For generalized paresis, suspect this could be secondary to failure to thrive, poor oral intake.  I doubt that this is from a primary CNS event  2.	For episode of loss of consciousness, suspect most likely this was a syncopal event.  No clear history to suggest underlying epilepsy.  No sign on examination to suggest cerebrovascular accident has ensued.  3.	I would recommend Physical Therapy evaluation.  4.	Fall precautions.  5.	psych evaluation   6.	Monitor oral intake.  7.	Greater than 30 minutes of time was spent with the patient, plan of care, reviewing data, speaking to the multidisciplinary healthcare team.

## 2020-05-13 NOTE — PROGRESS NOTE ADULT - PROBLEM SELECTOR PLAN 2
Started on Lovenox - transitioned to xarelto  Hematology consulted, eval noted  D/w Dr. Hobbs , no indication for IVC filter and will f/u as out patient after discharge for hypercoagulable w/u

## 2020-05-13 NOTE — PROGRESS NOTE BEHAVIORAL HEALTH - NSBHFUPINTERVALHXFT_PSY_A_CORE
Patient seen, evaluated and chart reviewed. Patient reports compliance with treatment, no side-effects are reported. Patient reports continued poor mood with poor appetite and worsened concentration. He admits to being overwhelmed by the medical situation, and he is anxious about possible procedure on his kidney stone. It appears that patient may be discharged tomorrow.

## 2020-05-13 NOTE — PROGRESS NOTE ADULT - PROBLEM SELECTOR PLAN 3
- episode of hematuria CT abdomen significant for L renal stones and partial staghorn calculus initially but repeat -CT pre and post contrast- Enhancing left renal mass suspicious for solid neoplasm, spoke with Dr Otto, nonemergent workup at this time, f/u as an outpatient with primary urologist  - Noted heme/onc eval note -- currently active DVT w/ family hx suspicious of underlying hypercoag etiology, will need outpatient follow up   - will continue xarelto at present and monitor closely for hematuria  - monitor H/H  - urology consult   Will eventually need referal to tertiary center as o/p for percutaneous nephrolithotomy per MARIZOL  D/w Dr. Valentina FONTANA, Ok to continue Xarelto

## 2020-05-13 NOTE — PROGRESS NOTE ADULT - ASSESSMENT
64 year old male without any significant known PMHx who presented to the ED c/o worsening weakness over the past 1 month with associated chills, and syncope, admitted for r/o COVID19 and syncope workup. Also found to have depression and suicidal, found to have right leg DVT probably provoked related to immobility, has been bridged to Xarelto     Syncope  - No evidence of arrythmia on tele or ekg- Unlikely cardiac etiology   - Encourage PO hydration. Likely related to poor PO intake  - TTE was difficult and incomplete study with normal LV function, unable to evaluate valvular pathology   -troponin negative         - Monitor and replete lytes, keep K>4, Mg>2.  - All other medical needs as per primary team.  - Other cardiovascular workup will depend on clinical course.  Eloise BRANDP-C  Cardiology NP  [id number] [phone]

## 2020-05-13 NOTE — PROGRESS NOTE BEHAVIORAL HEALTH - NSBHCHARTREVIEWVS_PSY_A_CORE FT
Vital Signs Last 24 Hrs  T(C): 36.2 (13 May 2020 12:11), Max: 36.7 (13 May 2020 04:33)  T(F): 97.2 (13 May 2020 12:11), Max: 98.1 (13 May 2020 04:33)  HR: 92 (13 May 2020 12:11) (90 - 105)  BP: 101/63 (13 May 2020 12:11) (100/70 - 124/82)  BP(mean): --  RR: 18 (13 May 2020 12:11) (17 - 18)  SpO2: 96% (13 May 2020 12:11) (95% - 96%)

## 2020-05-13 NOTE — PROGRESS NOTE ADULT - SUBJECTIVE AND OBJECTIVE BOX
[facility] -- Lizzy Gonsales, Rosibel, Tara, Ayo Blanco Savella  Office # [phone]      Follow Up:    near syncope  Subjective/Observations:   No events overnight resting comfortably in bed.  No complaints of chest pain, dyspnea, or palpitations reported. No signs of orthopnea or PND.      REVIEW OF SYSTEMS: All other review of systems is negative unless indicated above    PAST MEDICAL & SURGICAL HISTORY:  No pertinent past medical history  History of vocal cord polypectomy  Benign parotid tumor: 1983  History of appendectomy: 1965      MEDICATIONS  (STANDING):  ARIPiprazole 2 milliGRAM(s) Oral daily  folic acid 1 milliGRAM(s) Oral daily  mirtazapine 30 milliGRAM(s) Oral at bedtime  pantoprazole    Tablet 40 milliGRAM(s) Oral before breakfast  PARoxetine 30 milliGRAM(s) Oral daily  polyethylene glycol 3350 17 Gram(s) Oral two times a day  rivaroxaban 15 milliGRAM(s) Oral two times a day with meals  senna 2 Tablet(s) Oral at bedtime    MEDICATIONS  (PRN):      Allergies    No Known Allergies    Intolerances        Vital Signs Last 24 Hrs  T(C): 36.7 (13 May 2020 04:33), Max: 36.7 (13 May 2020 04:33)  T(F): 98.1 (13 May 2020 04:33), Max: 98.1 (13 May 2020 04:33)  HR: 90 (13 May 2020 04:33) (90 - 105)  BP: 124/82 (13 May 2020 04:33) (100/70 - 126/80)  BP(mean): --  RR: 17 (13 May 2020 04:33) (17 - 18)  SpO2: 95% (13 May 2020 04:33) (94% - 96%)    I&O's Summary        PHYSICAL EXAM:  TELE:   Constitutional: NAD, awake and alert, well-developed  HEENT: Moist Mucous Membranes, Anicteric  Pulmonary: Non-labored, breath sounds are clear bilaterally, No wheezing, crackles or rhonchi  Cardiovascular: Regular, S1 and S2 nl, No murmurs, rubs, gallops or clicks  Gastrointestinal: Bowel Sounds present, soft, nontender.   Lymph: No lymphadenopathy. No peripheral edema.  Skin: No visible rashes or ulcers.  Psych:  Mood & affect appropriate    LABS: All Labs Reviewed:                        14.0   6.91  )-----------( 262      ( 13 May 2020 07:15 )             41.5                         13.4   8.18  )-----------( 187      ( 11 May 2020 07:10 )             39.4     13 May 2020 07:15    143    |  108    |  20     ----------------------------<  107    4.0     |  26     |  1.20   11 May 2020 07:10    141    |  106    |  15     ----------------------------<  100    3.6     |  26     |  1.10     Ca    8.5        13 May 2020 07:15  Ca    8.3        11 May 2020 07:10  Mg     2.3       11 May 2020 07:10        2 Lead ECG:   Ventricular Rate 113 BPM  Atrial Rate 113 BPM  P-R Interval 130 ms  QRS Duration 72 ms  Q-T Interval 368 ms  QTC Calculation(Bezet) 504 ms  P Axis -1 degrees  R Axis 0 degrees  T Axis 0 degrees  Diagnosis Line Sinus tachycardia  Junctional ST depression, probably normal  Borderline ECG  No previous ECGs available  Confirmed by Aneudy Gleason MD ([id number]) on 5/8/2020 1:36:50 PM (05-07-20 @ 17:29)    < from: TTE Echo Complete w/o contrast w/ Doppler (05.10.20 @ 09:21) >  Dimensions:    LA 3.3       Normal Values: 2.0 - 4.0 cm    Ao 3.8        Normal Values: 2.0 - 3.8 cm  SEPTUM Normal Values: 0.6 - 1.2 cm  PWT Normal Values: 0.6 - 1.1 cm  LVIDd Normal Values: 3.0 - 5.6 cm  LVIDs Normal Values: 1.8 - 4.0 cm      OBSERVATIONS:  Technically difficult and incomplete study, unable to obtain most views  Mitral Valve: Grossly normal  Aortic Valve/Aorta: Not well-visualized  Tricuspid Valve: Not visualized.  Pulmonic Valve: Not visualized  Left Atrium: Grossly normal  Right Atrium: Not visualized  Left Ventricle: Grossly normal left ventricular function. Endocardium is not well-visualized, cannot rule out segmental dysfunction  Right Ventricle: Not visualized      Conclusion:   Technically difficult and incomplete study  Grossly normal left ventricular function  Right ventricle is not visualized  Unable to comment on valvular structure or pathology        < end of copied text >      < from: US Duplex Carotid Arteries Complete, Bilateral (05.08.20 @ 09:50) >  Interpretation: Mild plaque formation is noted at both internal carotid arteries.  Blood flow velocities (cm/sec): (Normal is less than 125)  Right: Proximal CCA=67  Distal CCA=76  Prox ICA=35 Distal ICA=49  ECA=50  ICA/CCA ratio=0.7 (Normal= less than 2)  Left: Proximal CCA=102    Distal CCA=70  Prox ICA=38  Distal ICA=50      ECA=78  ICA/CCA ratio=0.7 (Normal= less than 2)    The right vertebral artery shows antegrade flow. The left vertebral artery shows antegrade flow.  IMPRESSION:  All peak systolic and end diastolic velocities are within normal limits. No sonographic evidence of hemodynamically significant stenosis.

## 2020-05-13 NOTE — SWALLOW BEDSIDE ASSESSMENT ADULT - COMMENTS
Pt received upright in bed, awake and cooperative, on room air. Pt denied pain pre and post assessment. Pt followed low level directives independently. Pt's voice noted to be somewhat weak. Pt reported he had "throat surgery in 1983", but was unable to recall additional details. Pt reported he had "weakness in his vocal folds" s/p surgical intervention and received x1 injection in 1984 and x1 injection in 1985. Pt reported he has not f/u with his ENT since 1985. Pt declined h/o dysphagia and has been tolerating a regular diet with thin liquids. Pt reported he began experiencing difficulty swallowing PO meds this admission.    CT lower chest 5/11: "Bilateral lower lobe discoid atelectasis and mild left lower lobe groundglass density. Developing trace left pleural effusion. Trace pericardial effusion." Pt's WBC is WFL, no fever.

## 2020-05-13 NOTE — CHART NOTE - NSCHARTNOTEFT_GEN_A_CORE
Assessment:  Pt seen for nutrition follow up.  Chart reviewed, hospital course noted.  Subjective information obtained from EMR review and conversation with pt.    64/M without any significant known PMHx who presented to the ED c/o worsening weakness over the past 1 month with associated chills, and syncope, admitted for r/o COVID19 and syncope workup. Also found to have depression and suicidal ideation with significant social concerns.  Started on antidepressant and antipsychotic Rx.   Pt found to have +R leg DVT while here. Started on anticoagulants.     Pt continues to have extremely poor po intake.  Swallow eval done today recommending regular diet thin liquids.  Pt ate only ice cream at lunch.  States only wants custard, puddings, ice cream.  States other food just not appealing and refuses to even try it. "I don't know what's wrong with me".  Agreeable to Ensure shakes with meals.  Pt reports +BM yesterday, loose from enema per pt.  During admission interview by this RD, Pt verbalized concern over getting DM as family members have it.  BS here have been slightly elevated, recommend Hga1c level.      May consider starting pt on appetite stimulant, however Remeron Rx with known side effect to increase appetite so in time this may be adequate.     Factors impacting intake: [ ] none [ ] nausea  [ ] vomiting [ ] diarrhea [ ] constipation  [ ]chewing problems [ ] swallowing issues  [x] other: depression    Diet Presciption: Diet, Regular (05-08-20 @ 01:18)    Intake: poor, refusing meals    Current Weight: 5/13 173#    Pertinent Medications: MEDICATIONS  (STANDING):  ARIPiprazole 2 milliGRAM(s) Oral daily  folic acid 1 milliGRAM(s) Oral daily  mirtazapine 30 milliGRAM(s) Oral at bedtime  pantoprazole    Tablet 40 milliGRAM(s) Oral before breakfast  PARoxetine 30 milliGRAM(s) Oral daily  polyethylene glycol 3350 17 Gram(s) Oral two times a day  rivaroxaban 15 milliGRAM(s) Oral two times a day with meals  senna 2 Tablet(s) Oral at bedtime    MEDICATIONS  (PRN):    Pertinent Labs: 05-13 Na143 mmol/L Glu 107 mg/dL<H> K+ 4.0 mmol/L Cr  1.20 mg/dL BUN 20 mg/dL 05-10 Alb 2.6 g/dL<L>     CAPILLARY BLOOD GLUCOSE      POCT Blood Glucose.: 135 mg/dL (12 May 2020 21:19)    Skin: no pressure injuries  Edema: none noted    Estimated Needs:   [x] no change since previous assessment based on admit wt of 160#.    [ ] recalculated:     Previous Nutrition Diagnosis:    [x]Inadequate Oral Intake    Nutrition Diagnosis is [x] ongoing  [ ] resolved [ ] not applicable     New Nutrition Diagnosis: [x] not applicable       Interventions:   Recommend  [ ] Change Diet To:  [x] Nutrition Supplement- recommend adding vanilla Ensure Enlive tid to meals. Pt agreeable.   [ ] Nutrition Support  [x] Other: Continue regular diet.  Encourage po intake, add soft moist snacks to trays ie. ice cream, pudding, custard.  Consider appetite stimulant in future if Remeron not effective in improving pts appetite.  REcommend obtain Hga1c level.     Monitoring and Evaluation:   [x] PO intake [ x ] Tolerance to diet prescription [ x ] weights [ x ] labs[ x ] follow up per protocol  [x] other: s/s GI distress, skin integrity, edema

## 2020-05-13 NOTE — SWALLOW BEDSIDE ASSESSMENT ADULT - ASR SWALLOW ASPIRATION MONITOR
fever/pneumonia/throat clearing/upper respiratory infection/cough/gurgly voice/oral hygiene/change of breathing pattern/position upright (89Y)

## 2020-05-13 NOTE — PROGRESS NOTE ADULT - SUBJECTIVE AND OBJECTIVE BOX
Patient is a 64y old  Male who presents with a chief complaint of Weakness (13 May 2020 16:05)      INTERVAL HPI: Pt seen and examined. States he still has some flat mood with depression and weakness but otherwise denies any acute complaints at this time.     OVERNIGHT EVENTS: none noted  T(F): 97.2 (05-13-20 @ 12:11), Max: 98.1 (05-13-20 @ 04:33)  HR: 92 (05-13-20 @ 12:11) (90 - 105)  BP: 101/63 (05-13-20 @ 12:11) (100/70 - 124/82)  RR: 18 (05-13-20 @ 12:11) (17 - 18)  SpO2: 96% (05-13-20 @ 12:11) (95% - 96%)  I&O's Summary      REVIEW OF SYSTEMS:  CONSTITUTIONAL: No fever, weight loss, + fatigue  RESPIRATORY: No cough, wheezing, chills or hemoptysis; No shortness of breath  CARDIOVASCULAR: No chest pain, palpitations, dizziness, or leg swelling  GASTROINTESTINAL: No abdominal or epigastric pain. No nausea, vomiting, or hematemesis; No diarrhea or constipation. No melena or hematochezia.  GENITOURINARY: No dysuria, frequency, hematuria, or incontinence  NEUROLOGICAL: No headaches, memory loss, loss of strength, numbness, or tremors  SKIN: No itching, burning, rashes, or lesions   MUSCULOSKELETAL: No joint pain or swelling; No muscle, back, or extremity pain  PSYCHIATRIC: No depression, anxiety, mood swings, or difficulty sleeping      PHYSICAL EXAM:  GENERAL: NAD, flat affect, labile, cooperative on exam and history  NERVOUS SYSTEM:  Alert & Oriented X3,  Motor Strength 4/5 B/L upper and lower extremities  CHEST/LUNG: Clear to percussion bilaterally; No rales, rhonchi, wheezing, or rubs  HEART: Regular rate and rhythm; No murmurs, rubs, or gallops  ABDOMEN: Soft, Nontender, Nondistended; Bowel sounds present  EXTREMITIES:  2+ Peripheral Pulses, No clubbing, cyanosis, or edema  SKIN: No rashes or lesions    LABS:                        14.0   6.91  )-----------( 262      ( 13 May 2020 07:15 )             41.5     05-13    143  |  108  |  20  ----------------------------<  107<H>  4.0   |  26  |  1.20    Ca    8.5      13 May 2020 07:15          CAPILLARY BLOOD GLUCOSE      POCT Blood Glucose.: 135 mg/dL (12 May 2020 21:19)              MEDICATIONS  (STANDING):  ARIPiprazole 2 milliGRAM(s) Oral daily  folic acid 1 milliGRAM(s) Oral daily  mirtazapine 30 milliGRAM(s) Oral at bedtime  pantoprazole    Tablet 40 milliGRAM(s) Oral before breakfast  PARoxetine 30 milliGRAM(s) Oral daily  polyethylene glycol 3350 17 Gram(s) Oral two times a day  rivaroxaban 15 milliGRAM(s) Oral two times a day with meals  senna 2 Tablet(s) Oral at bedtime    MEDICATIONS  (PRN):

## 2020-05-13 NOTE — SWALLOW BEDSIDE ASSESSMENT ADULT - SLP PERTINENT HISTORY OF CURRENT PROBLEM
Per charting, 64/M without any significant known PMHx who presented to the ED c/o worsening weakness over the past 1 month with associated chills, and syncope, admitted for r/o COVID19 and syncope workup. Also found to have depression and suicidal ideation with significant social concerns

## 2020-05-14 LAB
A1C WITH ESTIMATED AVERAGE GLUCOSE RESULT: 5.5 % — SIGNIFICANT CHANGE UP (ref 4–5.6)
ANION GAP SERPL CALC-SCNC: 6 MMOL/L — SIGNIFICANT CHANGE UP (ref 5–17)
BUN SERPL-MCNC: 21 MG/DL — SIGNIFICANT CHANGE UP (ref 7–23)
CALCIUM SERPL-MCNC: 8.4 MG/DL — LOW (ref 8.5–10.1)
CHLORIDE SERPL-SCNC: 109 MMOL/L — HIGH (ref 96–108)
CO2 SERPL-SCNC: 27 MMOL/L — SIGNIFICANT CHANGE UP (ref 22–31)
CREAT SERPL-MCNC: 1.2 MG/DL — SIGNIFICANT CHANGE UP (ref 0.5–1.3)
ESTIMATED AVERAGE GLUCOSE: 111 MG/DL — SIGNIFICANT CHANGE UP (ref 68–114)
GLUCOSE SERPL-MCNC: 83 MG/DL — SIGNIFICANT CHANGE UP (ref 70–99)
HCT VFR BLD CALC: 39 % — SIGNIFICANT CHANGE UP (ref 39–50)
HGB BLD-MCNC: 13 G/DL — SIGNIFICANT CHANGE UP (ref 13–17)
MCHC RBC-ENTMCNC: 30.1 PG — SIGNIFICANT CHANGE UP (ref 27–34)
MCHC RBC-ENTMCNC: 33.3 GM/DL — SIGNIFICANT CHANGE UP (ref 32–36)
MCV RBC AUTO: 90.3 FL — SIGNIFICANT CHANGE UP (ref 80–100)
NRBC # BLD: 0 /100 WBCS — SIGNIFICANT CHANGE UP (ref 0–0)
PLATELET # BLD AUTO: 258 K/UL — SIGNIFICANT CHANGE UP (ref 150–400)
POTASSIUM SERPL-MCNC: 3.6 MMOL/L — SIGNIFICANT CHANGE UP (ref 3.5–5.3)
POTASSIUM SERPL-SCNC: 3.6 MMOL/L — SIGNIFICANT CHANGE UP (ref 3.5–5.3)
RBC # BLD: 4.32 M/UL — SIGNIFICANT CHANGE UP (ref 4.2–5.8)
RBC # FLD: 13.1 % — SIGNIFICANT CHANGE UP (ref 10.3–14.5)
SODIUM SERPL-SCNC: 142 MMOL/L — SIGNIFICANT CHANGE UP (ref 135–145)
WBC # BLD: 6.3 K/UL — SIGNIFICANT CHANGE UP (ref 3.8–10.5)
WBC # FLD AUTO: 6.3 K/UL — SIGNIFICANT CHANGE UP (ref 3.8–10.5)

## 2020-05-14 PROCEDURE — 99232 SBSQ HOSP IP/OBS MODERATE 35: CPT

## 2020-05-14 PROCEDURE — 99233 SBSQ HOSP IP/OBS HIGH 50: CPT | Mod: GC

## 2020-05-14 RX ORDER — SENNA PLUS 8.6 MG/1
2 TABLET ORAL
Qty: 0 | Refills: 0 | DISCHARGE
Start: 2020-05-14

## 2020-05-14 RX ORDER — POLYETHYLENE GLYCOL 3350 17 G/17G
17 POWDER, FOR SOLUTION ORAL
Qty: 0 | Refills: 0 | DISCHARGE
Start: 2020-05-14

## 2020-05-14 RX ORDER — RIVAROXABAN 15 MG-20MG
1 KIT ORAL
Qty: 0 | Refills: 0 | DISCHARGE
Start: 2020-05-14

## 2020-05-14 RX ORDER — FOLIC ACID 0.8 MG
1 TABLET ORAL
Qty: 0 | Refills: 0 | DISCHARGE
Start: 2020-05-14

## 2020-05-14 RX ORDER — MIRTAZAPINE 45 MG/1
1 TABLET, ORALLY DISINTEGRATING ORAL
Qty: 0 | Refills: 0 | DISCHARGE
Start: 2020-05-14

## 2020-05-14 RX ORDER — ARIPIPRAZOLE 15 MG/1
1 TABLET ORAL
Qty: 0 | Refills: 0 | DISCHARGE
Start: 2020-05-14

## 2020-05-14 RX ORDER — PANTOPRAZOLE SODIUM 20 MG/1
1 TABLET, DELAYED RELEASE ORAL
Qty: 0 | Refills: 0 | DISCHARGE
Start: 2020-05-14

## 2020-05-14 RX ADMIN — PANTOPRAZOLE SODIUM 40 MILLIGRAM(S): 20 TABLET, DELAYED RELEASE ORAL at 12:26

## 2020-05-14 RX ADMIN — SENNA PLUS 2 TABLET(S): 8.6 TABLET ORAL at 22:00

## 2020-05-14 RX ADMIN — RIVAROXABAN 15 MILLIGRAM(S): KIT at 09:12

## 2020-05-14 RX ADMIN — ARIPIPRAZOLE 2 MILLIGRAM(S): 15 TABLET ORAL at 12:25

## 2020-05-14 RX ADMIN — Medication 30 MILLIGRAM(S): at 12:25

## 2020-05-14 RX ADMIN — RIVAROXABAN 15 MILLIGRAM(S): KIT at 18:43

## 2020-05-14 RX ADMIN — Medication 1 MILLIGRAM(S): at 12:25

## 2020-05-14 RX ADMIN — MIRTAZAPINE 30 MILLIGRAM(S): 45 TABLET, ORALLY DISINTEGRATING ORAL at 22:00

## 2020-05-14 NOTE — PROGRESS NOTE BEHAVIORAL HEALTH - NSBHFUPINTERVALHXFT_PSY_A_CORE
Patient seen, evaluated and chart reviewed. Patient reports compliance with treatment, no side-effects are reported. Patient reports continued poor mood with poor appetite and worsened concentration. He admits to being overwhelmed by the medical situation, and he is anxious about possible procedure on his kidney stone. Patient is medically stable at this time. He is not future-oriented and after talking to the brother-in-law it appears that inpatient hospitalizations is the best option for further treatment.

## 2020-05-14 NOTE — PROGRESS NOTE BEHAVIORAL HEALTH - RISK ASSESSMENT
Patient is experiencing worsening of his mood, and is isolative and has very level functioning

## 2020-05-14 NOTE — PROGRESS NOTE ADULT - PROBLEM SELECTOR PLAN 1
Suspect due to dehydration and poor oral intake due to depression. Patient presenting with worsening weakness, fatigue, over the past 1 month with 2 admitted falls in his home approx 2 weeks prior to admission with associated LOC and preceding dizziness.   -Patient attributes to Poor PO intake, feels like he does not have significant appetite   - encourage PO hydration  - Nutrition consulted. Reccs appreciated, A1c ordered, Ensure added.   - On Remeron which can help with appetite stimulation   -CT head negative for ICH or gross structural abnormality, CT cspine without acute fracture  -Patient without any outpatient followup ~ 30 years, likely with some chronic unaddressed issues  - Carotid doppler negative  -TTE was difficult and incomplete study with normal LV function, unable to evaluate valvular pathology   - Neuro, cardio consults appreciated Suspect due to dehydration and poor oral intake due to depression. Patient presenting with worsening weakness, fatigue, over the past 1 month with 2 admitted falls in his home approx 2 weeks prior to admission with associated LOC and preceding dizziness.   -Patient attributes to Poor PO intake, feels like he does not have significant appetite   - encourage PO hydration  - Nutrition consulted. Reccs appreciated, A1c ordered, Ensure added.   - Speech and swallow eval appreciated, solids w thins + asp precautions  - On Remeron which can help with appetite stimulation   -CT head negative for ICH or gross structural abnormality, CT cspine without acute fracture  -Patient without any outpatient followup ~ 30 years, likely with some chronic unaddressed issues  - Carotid doppler negative  -TTE was difficult and incomplete study with normal LV function, unable to evaluate valvular pathology   - Neuro, cardio consults appreciated

## 2020-05-14 NOTE — PROGRESS NOTE ADULT - SUBJECTIVE AND OBJECTIVE BOX
Neurology follow up note    GARDENIA [id number]      Interval History:    Patient feels tired     MEDICATIONS    ARIPiprazole 2 milliGRAM(s) Oral daily  folic acid 1 milliGRAM(s) Oral daily  mirtazapine 30 milliGRAM(s) Oral at bedtime  pantoprazole    Tablet 40 milliGRAM(s) Oral before breakfast  PARoxetine 30 milliGRAM(s) Oral daily  polyethylene glycol 3350 17 Gram(s) Oral two times a day  rivaroxaban 15 milliGRAM(s) Oral two times a day with meals  senna 2 Tablet(s) Oral at bedtime      Allergies    No Known Allergies    Intolerances            Vital Signs Last 24 Hrs  T(C): 36.6 (14 May 2020 04:41), Max: 36.7 (13 May 2020 20:59)  T(F): 97.9 (14 May 2020 04:41), Max: 98 (13 May 2020 20:59)  HR: 78 (14 May 2020 04:41) (78 - 92)  BP: 117/74 (14 May 2020 04:41) (99/57 - 117/74)  BP(mean): --  RR: 16 (14 May 2020 04:41) (14 - 18)  SpO2: 93% (14 May 2020 04:41) (93% - 96%)    REVIEW OF SYSTEMS:  Constitutional:  The patient denies fever, chills, or night sweats at present.  Head:  No headaches.  Eyes:  No double vision or blurry vision.  Ears:  No ringing in the ears.  Neck:  No neck pain.  Respiratory:  No shortness of breath.  Cardiovascular:  No chest pain.  Abdomen:  No nausea, vomiting, or abdominal pain.  Extremities/Neurological:  No numbness or tingling.  Musculoskeletal:  No joint pain.  General:  Positive history for the last one month of lethargy, generalized weakness, fatigue, episode of loss of consciousness.    PHYSICAL EXAMINATION:  HEENT:  Head:  Normocephalic, atraumatic.  Eyes:  No scleral icterus.  Ears:  Hearing bilaterally intact.  NECK:  Supple.  RESPIRATORY:  Good air entry bilaterally.  CARDIOVASCULAR:  S1 and S2 heard.  ABDOMEN:  Soft and nontender.  EXTREMITIES:  No clubbing or cyanosis were noted.      NEUROLOGIC:  The patient is awake and alert.  Extraocular movements were intact.  Pupils were equal, round, and reactive bilaterally 3 mm to 2 mm.  Speech was fluent.  Smile was symmetric.  Motor:  Bilateral upper and lower were 5/5.  Sensory:  Bilateral upper and lower intact to light touch.                   LABS:  CBC Full  -  ( 14 May 2020 09:25 )  WBC Count : 6.30 K/uL  RBC Count : 4.32 M/uL  Hemoglobin : 13.0 g/dL  Hematocrit : 39.0 %  Platelet Count - Automated : 258 K/uL  Mean Cell Volume : 90.3 fl  Mean Cell Hemoglobin : 30.1 pg  Mean Cell Hemoglobin Concentration : 33.3 gm/dL  Auto Neutrophil # : x  Auto Lymphocyte # : x  Auto Monocyte # : x  Auto Eosinophil # : x  Auto Basophil # : x  Auto Neutrophil % : x  Auto Lymphocyte % : x  Auto Monocyte % : x  Auto Eosinophil % : x  Auto Basophil % : x      05-14    142  |  109<H>  |  21  ----------------------------<  83  3.6   |  27  |  1.20    Ca    8.4<L>      14 May 2020 09:25      Hemoglobin A1C:       Vitamin B12         RADIOLOGY      ANALYSIS AND PLAN:  This is a 64-year-old with generalized paresis, episode of loss of consciousness.  1.	For generalized paresis, suspect this could be secondary to failure to thrive, poor oral intake.  I doubt that this is from a primary CNS event  2.	For episode of loss of consciousness, suspect most likely this was a syncopal event.  No clear history to suggest underlying epilepsy.  No sign on examination to suggest cerebrovascular accident has ensued.  3.	I would recommend Physical Therapy evaluation.  4.	Fall precautions.  5.	psych evaluation   6.	Monitor oral intake.  7.	Greater than 30 minutes of time was spent with the patient, plan of care, reviewing data, speaking to the multidisciplinary healthcare team.

## 2020-05-14 NOTE — PROGRESS NOTE ADULT - PROBLEM SELECTOR PLAN 10
- disposition Discussed with patients brother in law. Pt is currently living in an unsafe environment with no support system. Brother in law is in process of cleaning house and ordering furniture.   - Plan for d/c to inpatient psych once medically optimized. Family in agreement with plan - disposition Discussed with patients brother in law. Pt is currently living in an unsafe environment with no support system. Brother in law is in process of cleaning house and ordering furniture.   - Plan for d/c to inpatient psych, pt medically optimized and stable for discharge. Family in agreement with plan

## 2020-05-14 NOTE — PROGRESS NOTE BEHAVIORAL HEALTH - NSBHFUPSUICINTERVAL_PSY_A_CORE
yes (new onset IP/Consult)...

## 2020-05-14 NOTE — PROGRESS NOTE ADULT - NSHPATTENDINGPLANDISCUSS_GEN_ALL_CORE
Dr Conley psych, cm/sw, rn
PGY3, Consultants
Patient, Consultants, RN, NP
Patient, RN, resident, SW/CM , Consultants
Patient, , Psych, Resident, RN
Patient, Consultants, PGY3
brother in [facility], soumya mata, dr jaime pgy2, rn nina, Dr Colney psych

## 2020-05-14 NOTE — PROGRESS NOTE ADULT - ASSESSMENT
64 year old male without any significant known PMHx who presented to the ED c/o worsening weakness over the past 1 month with associated chills, and syncope, admitted for r/o COVID19 and syncope workup. Also found to have depression and suicidal, found to have right leg DVT probably provoked related to immobility, has been bridged to Xarelto     Syncope  - No evidence of arrythmia on tele or ekg- Unlikely cardiac etiology   - Encourage PO hydration. Likely related to poor PO intake  - TTE was difficult and incomplete study with normal LV function, unable to evaluate valvular pathology   -troponin negative     -From a cardiac standpoint the patient may be discharged home    - Monitor and replete lytes, keep K>4, Mg>2.  - All other medical needs as per primary team.  - Other cardiovascular workup will depend on clinical course.    Aneudy Long DNP, ANP-c  Cardiology   Spectra #[id number]  [phone]

## 2020-05-14 NOTE — PROGRESS NOTE ADULT - SUBJECTIVE AND OBJECTIVE BOX
Patient is a 64y old  Male who presents with a chief complaint of Weakness (13 May 2020 16:05)      FROM ADMISSION H+P:   HPI:  Patient is a 64/M without any significant known PMHx who presented to the ED c/o worsening weakness over the past 1 month with associated chills that he reports began on 3/30/20. Since that time he reports progressive weakness, fatigue, laying in bed for the majority of the day, difficulty with ambulation, and poor PO intake as he reports he no longer felt he had the desire to eat. He denies any associated SOB, cough, PARDO, but reports difficulty with ambulation as he feels his legs are weak. He reports a fall approx 2 weeks ago which occurred in his apartment which was preceded by dizziness which was described as a feeling of unsteadiness, in which he lost consciousness for an unknown period of time and awoke on the floor. denies any preceding palpitations, SOB, or chest pain. Of note, the patient has not been evaluated by a physician in approx 30 years. He attributes his dizziness and syncope to poor PO intake, often citing during the interview that he has "lost the will to live," and would "be better off dead." He denies any concrete plan to commit suicide, and denies any access to firearms in the home. He reports occasional diarrhea, approx 1x/week, but also has normal formed stools, currently denies any diarrhea. Denies any abdominal pain, cramping, nausea or vomiting. Denies any recent travel or sick contacts.       ED Vitals:   T 98.9  BP 92/62 RR 18 SpO2 93% on RA  Labs: CBC wnl, No electrolyte abnormalities, TBili 2.2, troponin negative x 1, blood EtOH, tylenol, ASA level wnl, COVID19 PCr negative.  CT Chest/AP: No traumatic injury. Extensive nonobstructing left renal stones, including a partial staghorn calculus.  CT Head: No acute intracranial hemorrhage  CT C spine: Degenerative changes, no acute fracture  EKG: Sinus tachycardia at 110  In the ED Patient received 2L NS bolus (08 May 2020 00:50)      ----  INTERVAL HPI/OVERNIGHT EVENTS: Pt seen and evaluated at the bedside. No acute overnight events occurred.     ----  PAST MEDICAL & SURGICAL HISTORY:  No pertinent past medical history  History of vocal cord polypectomy  Benign parotid tumor: 1983  History of appendectomy: 1965      FAMILY HISTORY:  Family history of CVA (Father)  Family history of myocardial infarction (Father)  Family history of type 2 diabetes mellitus (Father)      ----  MEDICATIONS  (STANDING):  ARIPiprazole 2 milliGRAM(s) Oral daily  folic acid 1 milliGRAM(s) Oral daily  mirtazapine 30 milliGRAM(s) Oral at bedtime  pantoprazole    Tablet 40 milliGRAM(s) Oral before breakfast  PARoxetine 30 milliGRAM(s) Oral daily  polyethylene glycol 3350 17 Gram(s) Oral two times a day  rivaroxaban 15 milliGRAM(s) Oral two times a day with meals  senna 2 Tablet(s) Oral at bedtime    MEDICATIONS  (PRN):      ----  REVIEW OF SYSTEMS:  CONSTITUTIONAL: No fever, weight loss, + fatigue  RESPIRATORY: No cough, wheezing, chills or hemoptysis; No shortness of breath  CARDIOVASCULAR: No chest pain, palpitations, dizziness, or leg swelling  GASTROINTESTINAL: No abdominal or epigastric pain. No nausea, vomiting, or hematemesis; No diarrhea or constipation. No melena or hematochezia.  GENITOURINARY: No dysuria, frequency, hematuria, or incontinence  NEUROLOGICAL: No headaches, memory loss, loss of strength, numbness, or tremors  SKIN: No itching, burning, rashes, or lesions   MUSCULOSKELETAL: No joint pain or swelling; No muscle, back, or extremity pain  PSYCHIATRIC: No depression, anxiety, mood swings, or difficulty sleeping      PHYSICAL EXAM:  GENERAL: NAD, flat affect, labile, cooperative on exam and history  NERVOUS SYSTEM:  Alert & Oriented X3,  Motor Strength 4/5 B/L upper and lower extremities  CHEST/LUNG: Clear to percussion bilaterally; No rales, rhonchi, wheezing, or rubs  HEART: Regular rate and rhythm; No murmurs, rubs, or gallops  ABDOMEN: Soft, Nontender, Nondistended; Bowel sounds present  EXTREMITIES:  2+ Peripheral Pulses, No clubbing, cyanosis, or edema  SKIN: No rashes or lesions    T(C): 36.6 (05-14-20 @ 04:41), Max: 36.7 (05-13-20 @ 20:59)  HR: 78 (05-14-20 @ 04:41) (78 - 92)  BP: 117/74 (05-14-20 @ 04:41) (99/57 - 117/74)  RR: 16 (05-14-20 @ 04:41) (14 - 18)  SpO2: 93% (05-14-20 @ 04:41) (93% - 96%)  Wt(kg): --    ----  I&O's Summary      LABS:                        14.0   6.91  )-----------( 262      ( 13 May 2020 07:15 )             41.5     05-13    143  |  108  |  20  ----------------------------<  107<H>  4.0   |  26  |  1.20    Ca    8.5      13 May 2020 07:15          CAPILLARY BLOOD GLUCOSE                    ----  Personally reviewed:  Vital sign trends: [ X ] yes    [  ] no     [  ] n/a  Laboratory results: [X  ] yes    [  ] no     [  ] n/a  Radiology results: [X  ] yes    [  ] no     [  ] n/a  Consultant recommendations: [X  ] yes    [  ] no     [  ] n/a Patient is a 64y old  Male who presents with a chief complaint of Weakness (13 May 2020 16:05)      FROM ADMISSION H+P:   HPI:  Patient is a 64/M without any significant known PMHx who presented to the ED c/o worsening weakness over the past 1 month with associated chills that he reports began on 3/30/20. Since that time he reports progressive weakness, fatigue, laying in bed for the majority of the day, difficulty with ambulation, and poor PO intake as he reports he no longer felt he had the desire to eat. He denies any associated SOB, cough, PARDO, but reports difficulty with ambulation as he feels his legs are weak. He reports a fall approx 2 weeks ago which occurred in his apartment which was preceded by dizziness which was described as a feeling of unsteadiness, in which he lost consciousness for an unknown period of time and awoke on the floor. denies any preceding palpitations, SOB, or chest pain. Of note, the patient has not been evaluated by a physician in approx 30 years. He attributes his dizziness and syncope to poor PO intake, often citing during the interview that he has "lost the will to live," and would "be better off dead." He denies any concrete plan to commit suicide, and denies any access to firearms in the home. He reports occasional diarrhea, approx 1x/week, but also has normal formed stools, currently denies any diarrhea. Denies any abdominal pain, cramping, nausea or vomiting. Denies any recent travel or sick contacts.       ED Vitals:   T 98.9  BP 92/62 RR 18 SpO2 93% on RA  Labs: CBC wnl, No electrolyte abnormalities, TBili 2.2, troponin negative x 1, blood EtOH, tylenol, ASA level wnl, COVID19 PCr negative.  CT Chest/AP: No traumatic injury. Extensive nonobstructing left renal stones, including a partial staghorn calculus.  CT Head: No acute intracranial hemorrhage  CT C spine: Degenerative changes, no acute fracture  EKG: Sinus tachycardia at 110  In the ED Patient received 2L NS bolus (08 May 2020 00:50)      ----  INTERVAL HPI/OVERNIGHT EVENTS: Pt seen and evaluated at the bedside. No acute overnight events occurred. States he is feeling more down today about multiple issues and no plan yet of suicidal ideation. Denies any other acute complaints.     ----  PAST MEDICAL & SURGICAL HISTORY:  No pertinent past medical history  History of vocal cord polypectomy  Benign parotid tumor: 1983  History of appendectomy: 1965      FAMILY HISTORY:  Family history of CVA (Father)  Family history of myocardial infarction (Father)  Family history of type 2 diabetes mellitus (Father)      ----  MEDICATIONS  (STANDING):  ARIPiprazole 2 milliGRAM(s) Oral daily  folic acid 1 milliGRAM(s) Oral daily  mirtazapine 30 milliGRAM(s) Oral at bedtime  pantoprazole    Tablet 40 milliGRAM(s) Oral before breakfast  PARoxetine 30 milliGRAM(s) Oral daily  polyethylene glycol 3350 17 Gram(s) Oral two times a day  rivaroxaban 15 milliGRAM(s) Oral two times a day with meals  senna 2 Tablet(s) Oral at bedtime    MEDICATIONS  (PRN):      ----  REVIEW OF SYSTEMS:  CONSTITUTIONAL: No fever, weight loss, + fatigue  RESPIRATORY: No cough, wheezing, chills or hemoptysis; No shortness of breath  CARDIOVASCULAR: No chest pain, palpitations, dizziness, or leg swelling  GASTROINTESTINAL: No abdominal or epigastric pain. No nausea, vomiting, or hematemesis; No diarrhea or constipation. No melena or hematochezia.  GENITOURINARY: No dysuria, frequency, hematuria, or incontinence  NEUROLOGICAL: No headaches, memory loss, loss of strength, numbness, or tremors  SKIN: No itching, burning, rashes, or lesions   MUSCULOSKELETAL: No joint pain or swelling; No muscle, back, or extremity pain  PSYCHIATRIC: ++severe depression, intermittent suicidal ideation, denies currently      PHYSICAL EXAM:  GENERAL: NAD, flat affect, labile, cooperative on exam and history  NERVOUS SYSTEM:  Alert & Oriented X3,  Motor Strength 4/5 B/L upper and lower extremities  CHEST/LUNG: Clear to percussion bilaterally; No rales, rhonchi, wheezing, or rubs  HEART: Regular rate and rhythm; No murmurs, rubs, or gallops  ABDOMEN: Soft, Nontender, Nondistended; Bowel sounds present  EXTREMITIES:  2+ Peripheral Pulses, No clubbing, cyanosis, or edema  SKIN: No rashes or lesions    T(C): 36.6 (05-14-20 @ 04:41), Max: 36.7 (05-13-20 @ 20:59)  HR: 78 (05-14-20 @ 04:41) (78 - 92)  BP: 117/74 (05-14-20 @ 04:41) (99/57 - 117/74)  RR: 16 (05-14-20 @ 04:41) (14 - 18)  SpO2: 93% (05-14-20 @ 04:41) (93% - 96%)  Wt(kg): --    ----  I&O's Summary      LABS:                        14.0   6.91  )-----------( 262      ( 13 May 2020 07:15 )             41.5     05-13    143  |  108  |  20  ----------------------------<  107<H>  4.0   |  26  |  1.20    Ca    8.5      13 May 2020 07:15          CAPILLARY BLOOD GLUCOSE                    ----  Personally reviewed:  Vital sign trends: [ X ] yes    [  ] no     [  ] n/a  Laboratory results: [X  ] yes    [  ] no     [  ] n/a  Radiology results: [X  ] yes    [  ] no     [  ] n/a  Consultant recommendations: [X  ] yes    [  ] no     [  ] n/a

## 2020-05-14 NOTE — PROGRESS NOTE ADULT - SUBJECTIVE AND OBJECTIVE BOX
[facility] -- Lizzy Gonsales, Rosibel, Tara, Ayo Blanco Savella  Office # [phone]      Follow Up:    near syncope  Subjective/Observations:   No events overnight resting comfortably in bed.  No complaints of chest pain, dyspnea, or palpitations reported. No signs of orthopnea or PND.     REVIEW OF SYSTEMS: All other review of systems is negative unless indicated above    PAST MEDICAL & SURGICAL HISTORY:  No pertinent past medical history  History of vocal cord polypectomy  Benign parotid tumor: 1983  History of appendectomy: 1965      MEDICATIONS  (STANDING):  ARIPiprazole 2 milliGRAM(s) Oral daily  folic acid 1 milliGRAM(s) Oral daily  mirtazapine 30 milliGRAM(s) Oral at bedtime  pantoprazole    Tablet 40 milliGRAM(s) Oral before breakfast  PARoxetine 30 milliGRAM(s) Oral daily  polyethylene glycol 3350 17 Gram(s) Oral two times a day  rivaroxaban 15 milliGRAM(s) Oral two times a day with meals  senna 2 Tablet(s) Oral at bedtime    MEDICATIONS  (PRN):      Allergies    No Known Allergies    Intolerances        Vital Signs Last 24 Hrs  T(C): 36.6 (14 May 2020 04:41), Max: 36.7 (13 May 2020 20:59)  T(F): 97.9 (14 May 2020 04:41), Max: 98 (13 May 2020 20:59)  HR: 78 (14 May 2020 04:41) (78 - 92)  BP: 117/74 (14 May 2020 04:41) (99/57 - 117/74)  BP(mean): --  RR: 16 (14 May 2020 04:41) (14 - 18)  SpO2: 93% (14 May 2020 04:41) (93% - 96%)    I&O's Summary        PHYSICAL EXAM:  TELE: Off tele   Constitutional: NAD, awake and alert, well-developed  HEENT: Moist Mucous Membranes, Anicteric  Pulmonary: Non-labored, breath sounds are clear bilaterally, No wheezing, crackles or rhonchi  Cardiovascular: Regular, S1 and S2 nl, No murmurs, rubs, gallops or clicks  Gastrointestinal: Bowel Sounds present, soft, nontender.   Lymph: No lymphadenopathy. No peripheral edema.  Skin: No visible rashes or ulcers.  Psych:  Mood & affect appropriate    LABS: All Labs Reviewed:                        13.0   6.30  )-----------( 258      ( 14 May 2020 09:25 )             39.0                         14.0   6.91  )-----------( 262      ( 13 May 2020 07:15 )             41.5     14 May 2020 09:25    142    |  109    |  21     ----------------------------<  83     3.6     |  27     |  1.20   13 May 2020 07:15    143    |  108    |  20     ----------------------------<  107    4.0     |  26     |  1.20     Ca    8.4        14 May 2020 09:25  Ca    8.5        13 May 2020 07:15               ECG:  < from: 12 Lead ECG (05.07.20 @ 17:29) >  Ventricular Rate 113 BPM    Atrial Rate 113 BPM    P-R Interval 130 ms    QRS Duration 72 ms    Q-T Interval 368 ms    QTC Calculation(Bezet) 504 ms    P Axis -1 degrees    R Axis 0 degrees    T Axis 0 degrees    Diagnosis Line Sinus tachycardia  Junctional ST depression, probably normal  Borderline ECG  No previous ECGs available  Confirmed by Rosibel MOBLEY, Aneudy ([id number]) on 5/8/2020 1:36:50 PM    < end of copied text >    Echo:  < from: TTE Echo Complete w/o contrast w/ Doppler (05.10.20 @ 09:21) >  EXAM:  ECHO TTE WO CON COMP W DOPP         PROCEDURE DATE:  05/10/2020        INTERPRETATION:  INDICATION: Syncope  Sonographer KL    Blood Pressure 102/63    Height 167.6cm     Weight 86 kg       BSA 1.96 sq m    Dimensions:    LA 3.3       Normal Values: 2.0 - 4.0 cm    Ao 3.8        Normal Values: 2.0 - 3.8 cm  SEPTUM Normal Values: 0.6 - 1.2 cm  PWT Normal Values: 0.6 - 1.1 cm  LVIDd Normal Values: 3.0 - 5.6 cm  LVIDs Normal Values: 1.8 - 4.0 cm      OBSERVATIONS:  Technically difficult and incomplete study, unable to obtain most views  Mitral Valve: Grossly normal  Aortic Valve/Aorta: Not well-visualized  Tricuspid Valve: Not visualized.  Pulmonic Valve: Not visualized  Left Atrium: Grossly normal  Right Atrium: Not visualized  Left Ventricle: Grossly normal left ventricular function. Endocardium is not well-visualized, cannot rule out segmental dysfunction  Right Ventricle: Not visualized      Conclusion:   Technically difficult and incomplete study  Grossly normal left ventricular function  Right ventricle is not visualized  Unable to comment on valvular structure or pathology                    ELISABETH MILAN   This document has been electronically signed. May 11 2020  8:49AM        < end of copied text >    Radiology:

## 2020-05-14 NOTE — PROGRESS NOTE BEHAVIORAL HEALTH - SUMMARY
63 y/o SWM, domiciled, retired, no significant prior psychiatric history, without any significant known PMHx who presented to the ED c/o worsening weakness over the past 1 month with associated chills that he reports began on 3/30/20.   Patient expressed frustration with his medical situation, as he is unsure about his diagnosis or the future prospects. At some point he expressed suicidal ideation, but it appears it was mostly passive in nature. At this time he reports worsened mood, poor sleep, worsened appetite, poor energy and interest. Denies symptoms of psychosis or caryn.    IMP: Adjustment disorder with depression    REC: Paxil 20 mg po daily, Remeron 15 mg po at HS
65 y/o SWM, domiciled, retired, no significant prior psychiatric history, without any significant known PMHx who presented to the ED c/o worsening weakness over the past 1 month with associated chills that he reports began on 3/30/20.   Patient expressed frustration with his medical situation, as he is unsure about his diagnosis or the future prospects. At some point he expressed suicidal ideation, but it appears it was mostly passive in nature. At this time he reports worsened mood, poor sleep, worsened appetite, poor energy and interest. Denies symptoms of psychosis or caryn.    IMP: Adjustment disorder with depression    REC: Paxil 30 mg po daily, Remeron 30 mg po at HS

## 2020-05-14 NOTE — PROGRESS NOTE BEHAVIORAL HEALTH - COMMENTS ON SUICIDE RISK/PROTECTIVE FACTORS:
Patient is experiencing worsening of mood

## 2020-05-14 NOTE — PROGRESS NOTE BEHAVIORAL HEALTH - NSBHCONSULTMEDS_PSY_A_CORE FT
Increase Paxil to 30 mg po daily, Increase Remeron to 30 mg po at HS, Add Abilify 2 mg po daily
Continue Paxil 30 mg po daily, Remeron to 30 mg po at HS, Abilify 2 mg po daily

## 2020-05-14 NOTE — PROGRESS NOTE ADULT - PROBLEM SELECTOR PLAN 3
- episode of hematuria CT abdomen significant for L renal stones and partial staghorn calculus initially but repeat -CT pre and post contrast- Enhancing left renal mass suspicious for solid neoplasm, spoke with Dr Otto, nonemergent workup at this time, f/u as an outpatient with primary urologist  - Noted heme/onc eval note -- currently active DVT w/ family hx suspicious of underlying hypercoag etiology, will need outpatient follow up   - will continue xarelto at present and monitor closely for hematuria  - monitor H/H  - urology consult   Will eventually need referal to tertiary center as o/p for percutaneous nephrolithotomy per MARIZOL  D/w Dr. Valentina FONTANA, Ok to continue Xarelto - episode of hematuria CT abdomen significant for L renal stones and partial staghorn calculus initially but repeat -CT pre and post contrast- Enhancing left renal mass suspicious for solid neoplasm, spoke with yesterday with uro on call Dr Otto, nonemergent workup at this time, f/u as an outpatient with primary urologist  - Noted heme/onc eval note -- currently active DVT w/ family hx suspicious of underlying hypercoag etiology, will need outpatient follow up   - will continue xarelto at present and monitor closely for hematuria  - monitor H/H  - urology consult   Will eventually need referal to tertiary center as o/p for percutaneous nephrolithotomy per MARIZOL  D/w Dr. Valentina FONTANA, Ok to continue Xarelto

## 2020-05-14 NOTE — PROGRESS NOTE BEHAVIORAL HEALTH - NSBHCHARTREVIEWLAB_PSY_A_CORE FT
13.0   6.30  )-----------( 258      ( 14 May 2020 09:25 )             39.0   05-14    142  |  109<H>  |  21  ----------------------------<  83  3.6   |  27  |  1.20    Ca    8.4<L>      14 May 2020 09:25

## 2020-05-14 NOTE — PROGRESS NOTE ADULT - PROBLEM SELECTOR PLAN 5
Psych follow up appreciated  Meds adjusted: On Abilify. Remeron and Paxil  Will monitor for improvement, Plan to dc to inpatient psych once medically optimized. Family aware and in agreement.   Denies suicidal ideation

## 2020-05-14 NOTE — PROGRESS NOTE ADULT - ASSESSMENT
64/M without any significant known PMHx who presented to the ED c/o worsening weakness over the past 1 month with associated chills, and syncope, admitted for r/o COVID19 and syncope workup. Also found to have depression and suicidal ideation with significant social concerns Course complicated by hematuria on Xarelto. Urological workup showing  L sided renal calculi, ? mass on imaging. 64/M without any significant known PMHx who presented to the ED c/o worsening weakness over the past 1 month with associated chills, and syncope, admitted for r/o COVID19 and syncope workup. Also found to have depression and suicidal ideation with significant social concerns Course complicated by hematuria on Xarelto, now resolved with recs from uro for outpt workup on remaining gu issues including renal mass eval

## 2020-05-14 NOTE — PROGRESS NOTE BEHAVIORAL HEALTH - NS_RISKASSESSMENTINTER_PSY_ALL_CORE
Moderate Acute Suicide Risk

## 2020-05-14 NOTE — PROGRESS NOTE BEHAVIORAL HEALTH - NSBHFUSUICIDEPROTECINTER_PSY_A_CORE
Responsibility to family and others/Identifies reasons for living/Supportive social network of family or friends/Has future plans
Has future plans/Responsibility to family and others/Supportive social network of family or friends/Identifies reasons for living
Identifies reasons for living/Has future plans/Responsibility to family and others/Supportive social network of family or friends
Identifies reasons for living/Responsibility to family and others/Supportive social network of family or friends/Has future plans

## 2020-05-14 NOTE — PROGRESS NOTE ADULT - PROBLEM SELECTOR PLAN 4
Patient with worsening weakness x 1 month, likely multifactorial etiology - psych, poor PO intake, deconditioning from lack of activity  -Encourage PO intake  -At baseline patient able to ambulate without assistive devices, states he is "active"  -PT consult appreciated

## 2020-05-14 NOTE — PROGRESS NOTE BEHAVIORAL HEALTH - NSBHCHARTREVIEWVS_PSY_A_CORE FT
Vital Signs Last 24 Hrs  T(C): 36.6 (14 May 2020 04:41), Max: 36.7 (13 May 2020 20:59)  T(F): 97.9 (14 May 2020 04:41), Max: 98 (13 May 2020 20:59)  HR: 78 (14 May 2020 04:41) (78 - 92)  BP: 117/74 (14 May 2020 04:41) (99/57 - 117/74)  BP(mean): --  RR: 16 (14 May 2020 04:41) (14 - 18)  SpO2: 93% (14 May 2020 04:41) (93% - 96%)

## 2020-05-14 NOTE — PROGRESS NOTE ADULT - SUBJECTIVE AND OBJECTIVE BOX
INTERVAL HPI/OVERNIGHT EVENTS:  pt seen and examined  denies n/v/abd pain  dec po  refused am meds  no acute ON gi events per nursing      MEDICATIONS  (STANDING):  ARIPiprazole 2 milliGRAM(s) Oral daily  folic acid 1 milliGRAM(s) Oral daily  mirtazapine 30 milliGRAM(s) Oral at bedtime  pantoprazole    Tablet 40 milliGRAM(s) Oral before breakfast  PARoxetine 30 milliGRAM(s) Oral daily  polyethylene glycol 3350 17 Gram(s) Oral two times a day  rivaroxaban 15 milliGRAM(s) Oral two times a day with meals  senna 2 Tablet(s) Oral at bedtime    MEDICATIONS  (PRN):      Allergies    No Known Allergies    Intolerances        Review of Systems:    General:   dec po  Eyes:  Good vision, no reported pain  ENT:  No sore throat, pain, runny nose, dysphagia  CV:  No pain, palpitations, hypo/hypertension  Resp:  No dyspnea, cough, tachypnea, wheezing  GI:  No pain, No nausea, No vomiting, No diarrhea, no constipation, No weight loss, No fever, No pruritis, No rectal bleeding, No melena, No dysphagia  :  No pain, bleeding, incontinence, nocturia  Muscle:  No pain, weakness  Neuro:  No weakness, tingling, memory problems  Psych:  No fatigue, insomnia, mood problems, depression  Endocrine:  No polyuria, polydypsia, cold/heat intolerance  Heme:  No petechiae, ecchymosis, easy bruisability  Skin:  No rash, tattoos, scars, edema      Vital Signs Last 24 Hrs  T(C): 36.6 (12 May 2020 06:15), Max: 36.6 (12 May 2020 06:15)  T(F): 97.8 (12 May 2020 06:15), Max: 97.8 (12 May 2020 06:15)  HR: 98 (12 May 2020 06:15) (92 - 98)  BP: 132/83 (12 May 2020 06:15) (107/71 - 132/83)  BP(mean): --  RR: 17 (12 May 2020 06:15) (16 - 17)  SpO2: 95% (12 May 2020 06:15) (95% - 98%)    PHYSICAL EXAM:    General:  nad  HEENT:  NC/AT  Abdomen:  Soft, non-tender, +dt  Extremities:  no edema  Neuro/Psych:  A&Ox3        LABS:                        13.4   8.18  )-----------( 187      ( 11 May 2020 07:10 )             39.4     05-11    141  |  106  |  15  ----------------------------<  100<H>  3.6   |  26  |  1.10    Ca    8.3<L>      11 May 2020 07:10  Mg     2.3     05-11    TPro  5.9<L>  /  Alb  2.6<L>  /  TBili  1.1  /  DBili  x   /  AST  18  /  ALT  18  /  AlkPhos  36<L>  05-10          RADIOLOGY & ADDITIONAL TESTS:

## 2020-05-15 ENCOUNTER — INPATIENT (INPATIENT)
Facility: HOSPITAL | Age: 65
LOS: 25 days | Discharge: HOME | End: 2020-06-10
Attending: PSYCHIATRY & NEUROLOGY | Admitting: PSYCHIATRY & NEUROLOGY
Payer: COMMERCIAL

## 2020-05-15 VITALS
SYSTOLIC BLOOD PRESSURE: 105 MMHG | DIASTOLIC BLOOD PRESSURE: 62 MMHG | HEART RATE: 81 BPM | RESPIRATION RATE: 17 BRPM | OXYGEN SATURATION: 95 % | TEMPERATURE: 98 F

## 2020-05-15 VITALS — WEIGHT: 182.1 LBS | TEMPERATURE: 99 F | HEIGHT: 65 IN

## 2020-05-15 DIAGNOSIS — Z90.49 ACQUIRED ABSENCE OF OTHER SPECIFIED PARTS OF DIGESTIVE TRACT: Chronic | ICD-10-CM

## 2020-05-15 DIAGNOSIS — F33.2 MAJOR DEPRESSIVE DISORDER, RECURRENT SEVERE WITHOUT PSYCHOTIC FEATURES: ICD-10-CM

## 2020-05-15 DIAGNOSIS — R31.9 HEMATURIA, UNSPECIFIED: ICD-10-CM

## 2020-05-15 DIAGNOSIS — Z98.890 OTHER SPECIFIED POSTPROCEDURAL STATES: Chronic | ICD-10-CM

## 2020-05-15 DIAGNOSIS — N28.89 OTHER SPECIFIED DISORDERS OF KIDNEY AND URETER: ICD-10-CM

## 2020-05-15 DIAGNOSIS — D11.0 BENIGN NEOPLASM OF PAROTID GLAND: Chronic | ICD-10-CM

## 2020-05-15 LAB
ANION GAP SERPL CALC-SCNC: 8 MMOL/L — SIGNIFICANT CHANGE UP (ref 5–17)
BUN SERPL-MCNC: 19 MG/DL — SIGNIFICANT CHANGE UP (ref 7–23)
CALCIUM SERPL-MCNC: 8.3 MG/DL — LOW (ref 8.5–10.1)
CHLORIDE SERPL-SCNC: 106 MMOL/L — SIGNIFICANT CHANGE UP (ref 96–108)
CO2 SERPL-SCNC: 27 MMOL/L — SIGNIFICANT CHANGE UP (ref 22–31)
CREAT SERPL-MCNC: 1.2 MG/DL — SIGNIFICANT CHANGE UP (ref 0.5–1.3)
GLUCOSE SERPL-MCNC: 85 MG/DL — SIGNIFICANT CHANGE UP (ref 70–99)
HCT VFR BLD CALC: 36.8 % — LOW (ref 39–50)
HGB BLD-MCNC: 12.5 G/DL — LOW (ref 13–17)
MCHC RBC-ENTMCNC: 30.3 PG — SIGNIFICANT CHANGE UP (ref 27–34)
MCHC RBC-ENTMCNC: 34 GM/DL — SIGNIFICANT CHANGE UP (ref 32–36)
MCV RBC AUTO: 89.3 FL — SIGNIFICANT CHANGE UP (ref 80–100)
NRBC # BLD: 0 /100 WBCS — SIGNIFICANT CHANGE UP (ref 0–0)
PLATELET # BLD AUTO: 237 K/UL — SIGNIFICANT CHANGE UP (ref 150–400)
POTASSIUM SERPL-MCNC: 3.3 MMOL/L — LOW (ref 3.5–5.3)
POTASSIUM SERPL-SCNC: 3.3 MMOL/L — LOW (ref 3.5–5.3)
RBC # BLD: 4.12 M/UL — LOW (ref 4.2–5.8)
RBC # FLD: 13.2 % — SIGNIFICANT CHANGE UP (ref 10.3–14.5)
SODIUM SERPL-SCNC: 141 MMOL/L — SIGNIFICANT CHANGE UP (ref 135–145)
WBC # BLD: 6.33 K/UL — SIGNIFICANT CHANGE UP (ref 3.8–10.5)
WBC # FLD AUTO: 6.33 K/UL — SIGNIFICANT CHANGE UP (ref 3.8–10.5)

## 2020-05-15 PROCEDURE — 87040 BLOOD CULTURE FOR BACTERIA: CPT

## 2020-05-15 PROCEDURE — 82962 GLUCOSE BLOOD TEST: CPT

## 2020-05-15 PROCEDURE — 93005 ELECTROCARDIOGRAM TRACING: CPT

## 2020-05-15 PROCEDURE — 97161 PT EVAL LOW COMPLEX 20 MIN: CPT

## 2020-05-15 PROCEDURE — 85730 THROMBOPLASTIN TIME PARTIAL: CPT

## 2020-05-15 PROCEDURE — 84153 ASSAY OF PSA TOTAL: CPT

## 2020-05-15 PROCEDURE — 82607 VITAMIN B-12: CPT

## 2020-05-15 PROCEDURE — 87635 SARS-COV-2 COVID-19 AMP PRB: CPT

## 2020-05-15 PROCEDURE — 82553 CREATINE MB FRACTION: CPT

## 2020-05-15 PROCEDURE — 71250 CT THORAX DX C-: CPT

## 2020-05-15 PROCEDURE — 80307 DRUG TEST PRSMV CHEM ANLYZR: CPT

## 2020-05-15 PROCEDURE — 80053 COMPREHEN METABOLIC PANEL: CPT

## 2020-05-15 PROCEDURE — 84443 ASSAY THYROID STIM HORMONE: CPT

## 2020-05-15 PROCEDURE — 83690 ASSAY OF LIPASE: CPT

## 2020-05-15 PROCEDURE — 85027 COMPLETE CBC AUTOMATED: CPT

## 2020-05-15 PROCEDURE — 83605 ASSAY OF LACTIC ACID: CPT

## 2020-05-15 PROCEDURE — 93880 EXTRACRANIAL BILAT STUDY: CPT

## 2020-05-15 PROCEDURE — 97530 THERAPEUTIC ACTIVITIES: CPT

## 2020-05-15 PROCEDURE — 74178 CT ABD&PLV WO CNTR FLWD CNTR: CPT

## 2020-05-15 PROCEDURE — 93970 EXTREMITY STUDY: CPT

## 2020-05-15 PROCEDURE — 80076 HEPATIC FUNCTION PANEL: CPT

## 2020-05-15 PROCEDURE — 70450 CT HEAD/BRAIN W/O DYE: CPT

## 2020-05-15 PROCEDURE — 74176 CT ABD & PELVIS W/O CONTRAST: CPT

## 2020-05-15 PROCEDURE — 97116 GAIT TRAINING THERAPY: CPT

## 2020-05-15 PROCEDURE — 85610 PROTHROMBIN TIME: CPT

## 2020-05-15 PROCEDURE — 82140 ASSAY OF AMMONIA: CPT

## 2020-05-15 PROCEDURE — 99239 HOSP IP/OBS DSCHRG MGMT >30: CPT | Mod: GC

## 2020-05-15 PROCEDURE — 82746 ASSAY OF FOLIC ACID SERUM: CPT

## 2020-05-15 PROCEDURE — 36415 COLL VENOUS BLD VENIPUNCTURE: CPT

## 2020-05-15 PROCEDURE — 83036 HEMOGLOBIN GLYCOSYLATED A1C: CPT

## 2020-05-15 PROCEDURE — 83735 ASSAY OF MAGNESIUM: CPT

## 2020-05-15 PROCEDURE — 84484 ASSAY OF TROPONIN QUANT: CPT

## 2020-05-15 PROCEDURE — 92610 EVALUATE SWALLOWING FUNCTION: CPT

## 2020-05-15 PROCEDURE — 87086 URINE CULTURE/COLONY COUNT: CPT

## 2020-05-15 PROCEDURE — 72125 CT NECK SPINE W/O DYE: CPT

## 2020-05-15 PROCEDURE — 93306 TTE W/DOPPLER COMPLETE: CPT

## 2020-05-15 PROCEDURE — 99232 SBSQ HOSP IP/OBS MODERATE 35: CPT

## 2020-05-15 PROCEDURE — 81001 URINALYSIS AUTO W/SCOPE: CPT

## 2020-05-15 PROCEDURE — 80048 BASIC METABOLIC PNL TOTAL CA: CPT

## 2020-05-15 PROCEDURE — 99285 EMERGENCY DEPT VISIT HI MDM: CPT | Mod: 25

## 2020-05-15 PROCEDURE — 86803 HEPATITIS C AB TEST: CPT

## 2020-05-15 RX ORDER — HALOPERIDOL DECANOATE 100 MG/ML
1 INJECTION INTRAMUSCULAR ONCE
Refills: 0 | Status: DISCONTINUED | OUTPATIENT
Start: 2020-05-15 | End: 2020-06-10

## 2020-05-15 RX ORDER — HALOPERIDOL DECANOATE 100 MG/ML
0.5 INJECTION INTRAMUSCULAR EVERY 6 HOURS
Refills: 0 | Status: DISCONTINUED | OUTPATIENT
Start: 2020-05-15 | End: 2020-06-10

## 2020-05-15 RX ORDER — RIVAROXABAN 15 MG-20MG
15 KIT ORAL
Refills: 0 | Status: DISCONTINUED | OUTPATIENT
Start: 2020-05-15 | End: 2020-05-15

## 2020-05-15 RX ORDER — HYDROCORTISONE 1 %
1 OINTMENT (GRAM) TOPICAL DAILY
Refills: 0 | Status: COMPLETED | OUTPATIENT
Start: 2020-05-15 | End: 2020-05-20

## 2020-05-15 RX ORDER — PANTOPRAZOLE SODIUM 20 MG/1
40 TABLET, DELAYED RELEASE ORAL
Refills: 0 | Status: DISCONTINUED | OUTPATIENT
Start: 2020-05-15 | End: 2020-06-10

## 2020-05-15 RX ORDER — SENNA PLUS 8.6 MG/1
2 TABLET ORAL AT BEDTIME
Refills: 0 | Status: DISCONTINUED | OUTPATIENT
Start: 2020-05-15 | End: 2020-06-10

## 2020-05-15 RX ORDER — RIVAROXABAN 15 MG-20MG
15 KIT ORAL
Refills: 0 | Status: COMPLETED | OUTPATIENT
Start: 2020-05-16 | End: 2020-05-29

## 2020-05-15 RX ORDER — FOLIC ACID 0.8 MG
1 TABLET ORAL DAILY
Refills: 0 | Status: DISCONTINUED | OUTPATIENT
Start: 2020-05-15 | End: 2020-06-10

## 2020-05-15 RX ORDER — POLYETHYLENE GLYCOL 3350 17 G/17G
17 POWDER, FOR SOLUTION ORAL
Refills: 0 | Status: DISCONTINUED | OUTPATIENT
Start: 2020-05-15 | End: 2020-06-10

## 2020-05-15 RX ORDER — MIRTAZAPINE 45 MG/1
30 TABLET, ORALLY DISINTEGRATING ORAL AT BEDTIME
Refills: 0 | Status: DISCONTINUED | OUTPATIENT
Start: 2020-05-15 | End: 2020-06-10

## 2020-05-15 RX ADMIN — ARIPIPRAZOLE 2 MILLIGRAM(S): 15 TABLET ORAL at 12:02

## 2020-05-15 RX ADMIN — RIVAROXABAN 15 MILLIGRAM(S): KIT at 17:35

## 2020-05-15 RX ADMIN — Medication 30 MILLIGRAM(S): at 12:02

## 2020-05-15 RX ADMIN — Medication 1 MILLIGRAM(S): at 12:02

## 2020-05-15 RX ADMIN — RIVAROXABAN 15 MILLIGRAM(S): KIT at 08:24

## 2020-05-15 NOTE — PROGRESS NOTE ADULT - PROBLEM SELECTOR PLAN 1
Suspect due to dehydration and poor oral intake due to depression. Patient presenting with worsening weakness, fatigue, over the past 1 month with 2 admitted falls in his home approx 2 weeks prior to admission with associated LOC and preceding dizziness.   -Patient attributes to Poor PO intake, feels like he does not have significant appetite   - encourage PO hydration  - Nutrition consulted. Reccs appreciated, A1c wnl, Ensure added.   - Speech and swallow eval appreciated, solids w thins + asp precautions  - On Remeron which can help with appetite stimulation   -CT head negative for ICH or gross structural abnormality, CT cspine without acute fracture  -Patient without any outpatient followup ~ 30 years, likely with some chronic unaddressed issues  - Carotid doppler negative  -TTE was difficult and incomplete study with normal LV function, unable to evaluate valvular pathology   - Neuro, cardio consults appreciated

## 2020-05-15 NOTE — PROGRESS NOTE ADULT - SUBJECTIVE AND OBJECTIVE BOX
INTERVAL HPI/OVERNIGHT EVENTS: No gross hematuria    MEDICATIONS  (STANDING):  ARIPiprazole 2 milliGRAM(s) Oral daily  folic acid 1 milliGRAM(s) Oral daily  mirtazapine 30 milliGRAM(s) Oral at bedtime  pantoprazole    Tablet 40 milliGRAM(s) Oral before breakfast  PARoxetine 30 milliGRAM(s) Oral daily  polyethylene glycol 3350 17 Gram(s) Oral two times a day  rivaroxaban 15 milliGRAM(s) Oral two times a day with meals  senna 2 Tablet(s) Oral at bedtime    MEDICATIONS  (PRN):        Vital Signs Last 24 Hrs  T(C): 36.6 (15 May 2020 04:49), Max: 36.6 (15 May 2020 04:49)  T(F): 97.9 (15 May 2020 04:49), Max: 97.9 (15 May 2020 04:49)  HR: 78 (15 May 2020 04:49) (78 - 87)  BP: 101/66 (15 May 2020 04:49) (94/58 - 110/69)  BP(mean): --  RR: 17 (15 May 2020 04:49) (17 - 17)  SpO2: 95% (15 May 2020 04:49) (94% - 96%)    PHYSICAL EXAM:    ABDOMEN: Soft. No CVA tenderness. No abdominal mass palpable.      LABS:                        12.5   6.33  )-----------( 237      ( 15 May 2020 07:05 )             36.8     05-15    141  |  106  |  19  ----------------------------<  85  3.3<L>   |  27  |  1.20    Ca    8.3<L>      15 May 2020 07:05

## 2020-05-15 NOTE — PROGRESS NOTE ADULT - PROBLEM SELECTOR PROBLEM 5
Major depressive episode
Suicidal ideation
Suicidal ideation
Major depressive episode

## 2020-05-15 NOTE — PROGRESS NOTE ADULT - SUBJECTIVE AND OBJECTIVE BOX
INTERVAL HPI/OVERNIGHT EVENTS:  pt seen and examined  denies n/v/abd pain, passing gas  ate cornflakes and drank ensure yesterday      MEDICATIONS  (STANDING):  ARIPiprazole 2 milliGRAM(s) Oral daily  folic acid 1 milliGRAM(s) Oral daily  mirtazapine 30 milliGRAM(s) Oral at bedtime  pantoprazole    Tablet 40 milliGRAM(s) Oral before breakfast  PARoxetine 30 milliGRAM(s) Oral daily  polyethylene glycol 3350 17 Gram(s) Oral two times a day  rivaroxaban 15 milliGRAM(s) Oral two times a day with meals  senna 2 Tablet(s) Oral at bedtime    MEDICATIONS  (PRN):      Allergies    No Known Allergies    Intolerances        Review of Systems:    General:   dec po  Eyes:  Good vision, no reported pain  ENT:  No sore throat, pain, runny nose, dysphagia  CV:  No pain, palpitations, hypo/hypertension  Resp:  No dyspnea, cough, tachypnea, wheezing  GI:  No pain, No nausea, No vomiting, No diarrhea, no constipation, No weight loss, No fever, No pruritis, No rectal bleeding, No melena, No dysphagia  :  No pain, bleeding, incontinence, nocturia  Muscle:  No pain, weakness  Neuro:  No weakness, tingling, memory problems  Psych:  No fatigue, insomnia, mood problems, depression  Endocrine:  No polyuria, polydypsia, cold/heat intolerance  Heme:  No petechiae, ecchymosis, easy bruisability  Skin:  No rash, tattoos, scars, edema      Vital Signs Last 24 Hrs  T(C): 36.6 (12 May 2020 06:15), Max: 36.6 (12 May 2020 06:15)  T(F): 97.8 (12 May 2020 06:15), Max: 97.8 (12 May 2020 06:15)  HR: 98 (12 May 2020 06:15) (92 - 98)  BP: 132/83 (12 May 2020 06:15) (107/71 - 132/83)  BP(mean): --  RR: 17 (12 May 2020 06:15) (16 - 17)  SpO2: 95% (12 May 2020 06:15) (95% - 98%)    PHYSICAL EXAM:    General:  nad  HEENT:  NC/AT  Abdomen:  Soft, non-tender, +dt  Extremities:  no edema  Neuro/Psych:  A&Ox3        LABS:                        13.4   8.18  )-----------( 187      ( 11 May 2020 07:10 )             39.4     05-11    141  |  106  |  15  ----------------------------<  100<H>  3.6   |  26  |  1.10    Ca    8.3<L>      11 May 2020 07:10  Mg     2.3     05-11    TPro  5.9<L>  /  Alb  2.6<L>  /  TBili  1.1  /  DBili  x   /  AST  18  /  ALT  18  /  AlkPhos  36<L>  05-10          RADIOLOGY & ADDITIONAL TESTS:

## 2020-05-15 NOTE — PROGRESS NOTE ADULT - PROBLEM SELECTOR PROBLEM 3
Major depressive episode
Major depressive episode
DVT (deep venous thrombosis)
DVT (deep venous thrombosis)
Weakness
Hematuria
Weakness
Weakness
Hematuria

## 2020-05-15 NOTE — DISCHARGE NOTE NURSING/CASE MANAGEMENT/SOCIAL WORK - PATIENT PORTAL LINK FT
You can access the FollowMyHealth Patient Portal offered by [facility] by registering at the following website: [email or web address]. By joining [employer]’s FollowMyHealth portal, you will also be able to view your health information using other applications (apps) compatible with our system.

## 2020-05-15 NOTE — PROGRESS NOTE ADULT - SUBJECTIVE AND OBJECTIVE BOX
[facility] -- Lizzy Gonsales, Tara Gleason, Ayo Blanco Savella, Goodger: Office # [phone]    Follow Up:  near syncope    Subjective/Observations: Patient seen and examined. Patient awake and alert, resting comfortably in bed. States he is fatigued. No complaints of chest pain, SOB, LE edema, cough. No signs of orthopnea or PND.    REVIEW OF SYSTEMS: All review of systems is negative for eye, ENT, GI, , allergic, dermatologic, musculoskeletal and neurologic except as described above    PAST MEDICAL & SURGICAL HISTORY:  No pertinent past medical history  History of vocal cord polypectomy  Benign parotid tumor: 1983  History of appendectomy: 1965    MEDICATIONS  (STANDING):  ARIPiprazole 2 milliGRAM(s) Oral daily  folic acid 1 milliGRAM(s) Oral daily  mirtazapine 30 milliGRAM(s) Oral at bedtime  pantoprazole    Tablet 40 milliGRAM(s) Oral before breakfast  PARoxetine 30 milliGRAM(s) Oral daily  polyethylene glycol 3350 17 Gram(s) Oral two times a day  rivaroxaban 15 milliGRAM(s) Oral two times a day with meals  senna 2 Tablet(s) Oral at bedtime    MEDICATIONS  (PRN):    Allergies  No Known Allergies    Vital Signs Last 24 Hrs  T(C): 36.6 (15 May 2020 04:49), Max: 36.6 (15 May 2020 04:49)  T(F): 97.9 (15 May 2020 04:49), Max: 97.9 (15 May 2020 04:49)  HR: 78 (15 May 2020 04:49) (78 - 87)  BP: 101/66 (15 May 2020 04:49) (94/58 - 110/69)  BP(mean): --  RR: 17 (15 May 2020 04:49) (17 - 17)  SpO2: 95% (15 May 2020 04:49) (94% - 96%)    I&O's Summary    14 May 2020 07:01  -  15 May 2020 07:00  --------------------------------------------------------  IN: 860 mL / OUT: 225 mL / NET: 635 mL    TELE: Not on telemetry   PHYSICAL EXAM:  Appearance: NAD, no distress, alert, Well developed   HEENT: Moist Mucous Membranes, Anicteric  Cardiovascular: Regular rate and rhythm, Normal S1 S2, No JVD, No murmurs, No rubs, gallops or clicks  Respiratory: Non-labored, Clear to auscultation, No rales, No rhonchi, No wheezing.   Gastrointestinal:  Soft, Non-tender, + BS  Neurologic: Non-focal  Skin: Warm and dry, No visible rashes or ulcers, No ecchymosis, No cyanosis  Musculoskeletal: No clubbing, No cyanosis, No joint swelling/tenderness  Psychiatry: Mood & affect appropriate  Lymph: No peripheral edema.     LABS: All Labs Reviewed:                        12.5   6.33  )-----------( 237      ( 15 May 2020 07:05 )             36.8                         13.0   6.30  )-----------( 258      ( 14 May 2020 09:25 )             39.0                         14.0   6.91  )-----------( 262      ( 13 May 2020 07:15 )             41.5     15 May 2020 07:05    141    |  106    |  19     ----------------------------<  85     3.3     |  27     |  1.20   14 May 2020 09:25    142    |  109    |  21     ----------------------------<  83     3.6     |  27     |  1.20   13 May 2020 07:15    143    |  108    |  20     ----------------------------<  107    4.0     |  26     |  1.20     Ca    8.3        15 May 2020 07:05  Ca    8.4        14 May 2020 09:25  Ca    8.5        13 May 2020 07:15    12 Lead ECG:   Ventricular Rate 113 BPM  Atrial Rate 113 BPM  P-R Interval 130 ms  QRS Duration 72 ms  Q-T Interval 368 ms  QTC Calculation(Bezet) 504 ms  P Axis -1 degrees  R Axis 0 degrees  T Axis 0 degrees  Diagnosis Line Sinus tachycardia  Junctional ST depression, probably normal  Borderline ECG  No previous ECGs available  Confirmed by Aneudy Gleason MD ([id number]) on 5/8/2020 1:36:50 PM (05-07-20 @ 17:29)    < from: TTE Echo Complete w/o contrast w/ Doppler (05.10.20 @ 09:21) >  Dimensions:    LA 3.3       Normal Values: 2.0 - 4.0 cm    Ao 3.8        Normal Values: 2.0 - 3.8 cm  SEPTUM Normal Values: 0.6 - 1.2 cm  PWT Normal Values: 0.6 - 1.1 cm  LVIDd Normal Values: 3.0 - 5.6 cm  LVIDs Normal Values: 1.8 - 4.0 cm      OBSERVATIONS:  Technically difficult and incomplete study, unable to obtain most views  Mitral Valve: Grossly normal  Aortic Valve/Aorta: Not well-visualized  Tricuspid Valve: Not visualized.  Pulmonic Valve: Not visualized  Left Atrium: Grossly normal  Right Atrium: Not visualized  Left Ventricle: Grossly normal left ventricular function. Endocardium is not well-visualized, cannot rule out segmental dysfunction  Right Ventricle: Not visualized      Conclusion:   Technically difficult and incomplete study  Grossly normal left ventricular function  Right ventricle is not visualized  Unable to comment on valvular structure or pathology        < end of copied text >      < from: US Duplex Carotid Arteries Complete, Bilateral (05.08.20 @ 09:50) >  Interpretation: Mild plaque formation is noted at both internal carotid arteries.  Blood flow velocities (cm/sec): (Normal is less than 125)  Right: Proximal CCA=67  Distal CCA=76  Prox ICA=35 Distal ICA=49  ECA=50  ICA/CCA ratio=0.7 (Normal= less than 2)  Left: Proximal CCA=102    Distal CCA=70  Prox ICA=38  Distal ICA=50      ECA=78  ICA/CCA ratio=0.7 (Normal= less than 2)    The right vertebral artery shows antegrade flow. The left vertebral artery shows antegrade flow.  IMPRESSION:  All peak systolic and end diastolic velocities are within normal limits. No sonographic evidence of hemodynamically significant stenosis.    < end of copied text >    < from: CT Chest No Cont (05.07.20 @ 20:34) >  FINDINGS:  CHEST:   LUNGS AND LARGE AIRWAYS: Patent central airways. No pulmonary nodules. Mild bilateral lower lobe subsegmental atelectasis.  PLEURA: No pleural effusion.  VESSELS: Normal caliber aorta. Mild coronary artery calcification.  HEART: Heart size is normal. No pericardial effusion.  MEDIASTINUM AND SELVIN: No lymphadenopathy.  CHEST WALL AND LOWER NECK: Within normal limits.    ABDOMEN AND PELVIS:  LIVER: Within normal limits.  BILE DUCTS: Normal caliber.  GALLBLADDER: Multiple gallstones.  SPLEEN: Within normal limits.  PANCREAS: Within normal limits.  ADRENALS: Within normal limits.  KIDNEYS/URETERS: 1.9 cm nonobstructing left upper pole renal stone. Partial staghorn left renal calculus with multiple small stones versus fragmented larger stone in the left renal pelvis. No hydronephrosis. 2.7 cm hypodense lesion in the posterior aspect of the left kidney which is unable to be characterized on this unenhanced CT scan.  BLADDER: Within normal limits.  REPRODUCTIVE ORGANS: Prostate within normal limits.  BOWEL: No bowel obstruction. Appendix not identified. Mild colonic diverticulosis.  PERITONEUM: No ascites or hemoperitoneum.  VESSELS: Mild atherosclerotic arterial calcifications.  RETROPERITONEUM/LYMPH NODES: No lymphadenopathy.    ABDOMINAL WALL: Within normal limits.  BONES: No fractures.    IMPRESSION:   No traumatic injury.  Extensive nonobstructing left renal stones, including a partial staghorn calculus.    < end of copied text >    < from: CT Head No Cont (05.07.20 @ 20:32) >  IMPRESSION:    No acute intracranial hemorrhage or acute territorial infarct.     < end of copied text >

## 2020-05-15 NOTE — PROGRESS NOTE ADULT - SUBJECTIVE AND OBJECTIVE BOX
Neurology follow up note    GARDENIA [id number]      Interval History:    Patient feels tired     MEDICATIONS    ARIPiprazole 2 milliGRAM(s) Oral daily  folic acid 1 milliGRAM(s) Oral daily  mirtazapine 30 milliGRAM(s) Oral at bedtime  pantoprazole    Tablet 40 milliGRAM(s) Oral before breakfast  PARoxetine 30 milliGRAM(s) Oral daily  polyethylene glycol 3350 17 Gram(s) Oral two times a day  rivaroxaban 15 milliGRAM(s) Oral two times a day with meals  senna 2 Tablet(s) Oral at bedtime      Allergies    No Known Allergies    Intolerances            Vital Signs Last 24 Hrs  T(C): 36.6 (15 May 2020 04:49), Max: 36.6 (15 May 2020 04:49)  T(F): 97.9 (15 May 2020 04:49), Max: 97.9 (15 May 2020 04:49)  HR: 78 (15 May 2020 04:49) (78 - 87)  BP: 101/66 (15 May 2020 04:49) (94/58 - 110/69)  BP(mean): --  RR: 17 (15 May 2020 04:49) (17 - 17)  SpO2: 95% (15 May 2020 04:49) (94% - 96%)      REVIEW OF SYSTEMS:  Constitutional:  The patient denies fever, chills, or night sweats at present.  Head:  No headaches.  Eyes:  No double vision or blurry vision.  Ears:  No ringing in the ears.  Neck:  No neck pain.  Respiratory:  No shortness of breath.  Cardiovascular:  No chest pain.  Abdomen:  No nausea, vomiting, or abdominal pain.  Extremities/Neurological:  No numbness or tingling.  Musculoskeletal:  No joint pain.  General:  Positive history for the last one month of lethargy, generalized weakness, fatigue, episode of loss of consciousness.    PHYSICAL EXAMINATION:  HEENT:  Head:  Normocephalic, atraumatic.  Eyes:  No scleral icterus.  Ears:  Hearing bilaterally intact.  NECK:  Supple.  RESPIRATORY:  Good air entry bilaterally.  CARDIOVASCULAR:  S1 and S2 heard.  ABDOMEN:  Soft and nontender.  EXTREMITIES:  No clubbing or cyanosis were noted.      NEUROLOGIC:  The patient is awake and alert.  Extraocular movements were intact.  Pupils were equal, round, and reactive bilaterally 3 mm to 2 mm.  Speech was fluent.  Smile was symmetric.  Motor:  Bilateral upper and lower were 5/5.  Sensory:  Bilateral upper and lower intact to light touch.              LABS:  CBC Full  -  ( 15 May 2020 07:05 )  WBC Count : 6.33 K/uL  RBC Count : 4.12 M/uL  Hemoglobin : 12.5 g/dL  Hematocrit : 36.8 %  Platelet Count - Automated : 237 K/uL  Mean Cell Volume : 89.3 fl  Mean Cell Hemoglobin : 30.3 pg  Mean Cell Hemoglobin Concentration : 34.0 gm/dL  Auto Neutrophil # : x  Auto Lymphocyte # : x  Auto Monocyte # : x  Auto Eosinophil # : x  Auto Basophil # : x  Auto Neutrophil % : x  Auto Lymphocyte % : x  Auto Monocyte % : x  Auto Eosinophil % : x  Auto Basophil % : x      05-15    141  |  106  |  19  ----------------------------<  85  3.3<L>   |  27  |  1.20    Ca    8.3<L>      15 May 2020 07:05      Hemoglobin A1C:       Vitamin B12         RADIOLOGY    ANALYSIS AND PLAN:  This is a 64-year-old with generalized paresis, episode of loss of consciousness.  1.	For generalized paresis, suspect this could be secondary to failure to thrive, poor oral intake.  I doubt that this is from a primary CNS event  2.	For episode of loss of consciousness, suspect most likely this was a syncopal event.  No clear history to suggest underlying epilepsy.  No sign on examination to suggest cerebrovascular accident has ensued.  3.	I would recommend Physical Therapy evaluation.  4.	Fall precautions.  5.	psych evaluation   6.	Monitor oral intake.  7.	Greater than 30 minutes of time was spent with the patient, plan of care, reviewing data, speaking to the multidisciplinary healthcare team.  8.	no new events Neurology follow up note    GARDENIA [id number]      Interval History:    Patient feels tired     MEDICATIONS    ARIPiprazole 2 milliGRAM(s) Oral daily  folic acid 1 milliGRAM(s) Oral daily  mirtazapine 30 milliGRAM(s) Oral at bedtime  pantoprazole    Tablet 40 milliGRAM(s) Oral before breakfast  PARoxetine 30 milliGRAM(s) Oral daily  polyethylene glycol 3350 17 Gram(s) Oral two times a day  rivaroxaban 15 milliGRAM(s) Oral two times a day with meals  senna 2 Tablet(s) Oral at bedtime      Allergies    No Known Allergies    Intolerances            Vital Signs Last 24 Hrs  T(C): 36.6 (15 May 2020 04:49), Max: 36.6 (15 May 2020 04:49)  T(F): 97.9 (15 May 2020 04:49), Max: 97.9 (15 May 2020 04:49)  HR: 78 (15 May 2020 04:49) (78 - 87)  BP: 101/66 (15 May 2020 04:49) (94/58 - 110/69)  BP(mean): --  RR: 17 (15 May 2020 04:49) (17 - 17)  SpO2: 95% (15 May 2020 04:49) (94% - 96%)      REVIEW OF SYSTEMS:  Constitutional:  The patient denies fever, chills, or night sweats at present.  Head:  No headaches.  Eyes:  No double vision or blurry vision.  Ears:  No ringing in the ears.  Neck:  No neck pain.  Respiratory:  No shortness of breath.  Cardiovascular:  No chest pain.  Abdomen:  No nausea, vomiting, or abdominal pain.  Extremities/Neurological:  No numbness or tingling.  Musculoskeletal:  No joint pain.  General:  Positive history for the last one month of lethargy, generalized weakness, fatigue, episode of loss of consciousness.    PHYSICAL EXAMINATION:  HEENT:  Head:  Normocephalic, atraumatic.  Eyes:  No scleral icterus.  Ears:  Hearing bilaterally intact.  NECK:  Supple.  RESPIRATORY:  Good air entry bilaterally.  CARDIOVASCULAR:  S1 and S2 heard.  ABDOMEN:  Soft and nontender.  EXTREMITIES:  No clubbing or cyanosis were noted.      NEUROLOGIC:  The patient is awake and alert.  Extraocular movements were intact.  Pupils were equal, round, and reactive bilaterally 3 mm to 2 mm.  Speech was fluent.  Smile was symmetric.  Motor:  Bilateral upper and lower were 5/5.  Sensory:  Bilateral upper and lower intact to light touch.              LABS:  CBC Full  -  ( 15 May 2020 07:05 )  WBC Count : 6.33 K/uL  RBC Count : 4.12 M/uL  Hemoglobin : 12.5 g/dL  Hematocrit : 36.8 %  Platelet Count - Automated : 237 K/uL  Mean Cell Volume : 89.3 fl  Mean Cell Hemoglobin : 30.3 pg  Mean Cell Hemoglobin Concentration : 34.0 gm/dL  Auto Neutrophil # : x  Auto Lymphocyte # : x  Auto Monocyte # : x  Auto Eosinophil # : x  Auto Basophil # : x  Auto Neutrophil % : x  Auto Lymphocyte % : x  Auto Monocyte % : x  Auto Eosinophil % : x  Auto Basophil % : x      05-15    141  |  106  |  19  ----------------------------<  85  3.3<L>   |  27  |  1.20    Ca    8.3<L>      15 May 2020 07:05      Hemoglobin A1C:       Vitamin B12         RADIOLOGY    ANALYSIS AND PLAN:  This is a 64-year-old with generalized paresis, episode of loss of consciousness.  1.	For generalized paresis, suspect this could be secondary to failure to thrive, poor oral intake.  I doubt that this is from a primary CNS event  2.	For episode of loss of consciousness, suspect most likely this was a syncopal event.  No clear history to suggest underlying epilepsy.  No sign on examination to suggest cerebrovascular accident has ensued.  3.	I would recommend Physical Therapy evaluation.  4.	Fall precautions.  5.	psych evaluation   6.	Monitor oral intake.  7.	renal mass urology   8.	Greater than 30 minutes of time was spent with the patient, plan of care, reviewing data, speaking to the multidisciplinary healthcare team.  9.	no new events

## 2020-05-15 NOTE — PROGRESS NOTE ADULT - REASON FOR ADMISSION
Weakness

## 2020-05-15 NOTE — PROGRESS NOTE ADULT - PROBLEM SELECTOR PLAN 6
Patient noted to have overgrown toenails of b/l feet, thick and discolored, c/w onychomycosis  -patient will likely require assistance in trimming toenails  -Podiatry consulted, f/u recs
Patient noted to have overgrown toenails of b/l feet, thick and discolored, c/w onychomycosis  -patient will likely require assistance in trimming toenails  -Podiatry consulted, f/u recs
Patient reports suicidal ideation on admission. Reports for the past few days - 1 week prior to admission, he felt that he would be better off dead  -Denies having had similar thoughts in the past  -No intention to hurt anyone else  -Denies having a concrete plan in place, nor any access to weapons or firearms in the home  -off constant obs now, pt stable, mood improved, continue with current med regimen  -Saul (Dr terry) consulted, recs appreciated
Patient reports suicidal ideation on admission. Reports for the past few days - 1 week, he felt that he would be better off dead  -Denies having had similar thoughts in the past  -No intention to hurt anyone else  -Denies having a concrete plan in place, nor any access to weapons or firearms in the home  -off constant obs now, pt stable, mood improved, continue with current med regimen  -Saul (Dr terry) consulted
Patient reports suicidal ideation on admission. Reports for the past few days - 1 week, he felt that he would be better off dead  -Denies having had similar thoughts in the past  -No intention to hurt anyone else  -Denies having a concrete plan in place, nor any access to weapons or firearms in the home  -off constant obs now, pt stable, mood improved, continue with current med regimen  -Saul (Dr terry) consulted, recs appreciated
Patient reports suicidal ideation on admission. Reports for the past few days - 1 week prior to admission, he felt that he would be better off dead  -Denies having had similar thoughts in the past  -No intention to hurt anyone else  -Denies having a concrete plan in place, nor any access to weapons or firearms in the home  -off constant obs now, pt stable, mood improved, continue with current med regimen  -Saul (Dr terry) consulted, recs appreciated

## 2020-05-15 NOTE — PROGRESS NOTE ADULT - PROBLEM SELECTOR PLAN 1
3 cm renal mass suspicious for neoplasm.  Patient is aware of the likelihood that this finding represents a malignancy that requires surgical intervention. He was advised to follow up with Dr. Terrance Mo at [facility] [phone] for consultation within next 4-6 weeks.

## 2020-05-15 NOTE — PROGRESS NOTE ADULT - SUBJECTIVE AND OBJECTIVE BOX
Patient is a 64y old  Male who presents with a chief complaint of Weakness (14 May 2020 11:32)      FROM ADMISSION H+P:   HPI:  Patient is a 64/M without any significant known PMHx who presented to the ED c/o worsening weakness over the past 1 month with associated chills that he reports began on 3/30/20. Since that time he reports progressive weakness, fatigue, laying in bed for the majority of the day, difficulty with ambulation, and poor PO intake as he reports he no longer felt he had the desire to eat. He denies any associated SOB, cough, PARDO, but reports difficulty with ambulation as he feels his legs are weak. He reports a fall approx 2 weeks ago which occurred in his apartment which was preceded by dizziness which was described as a feeling of unsteadiness, in which he lost consciousness for an unknown period of time and awoke on the floor. denies any preceding palpitations, SOB, or chest pain. Of note, the patient has not been evaluated by a physician in approx 30 years. He attributes his dizziness and syncope to poor PO intake, often citing during the interview that he has "lost the will to live," and would "be better off dead." He denies any concrete plan to commit suicide, and denies any access to firearms in the home. He reports occasional diarrhea, approx 1x/week, but also has normal formed stools, currently denies any diarrhea. Denies any abdominal pain, cramping, nausea or vomiting. Denies any recent travel or sick contacts.     ED Vitals:   T 98.9  BP 92/62 RR 18 SpO2 93% on RA  Labs: CBC wnl, No electrolyte abnormalities, TBili 2.2, troponin negative x 1, blood EtOH, tylenol, ASA level wnl, COVID19 PCr negative.  CT Chest/AP: No traumatic injury. Extensive nonobstructing left renal stones, including a partial staghorn calculus.  CT Head: No acute intracranial hemorrhage  CT C spine: Degenerative changes, no acute fracture  EKG: Sinus tachycardia at 110  In the ED Patient received 2L NS bolus (08 May 2020 00:50)      ----  INTERVAL HPI/OVERNIGHT EVENTS: Pt seen and evaluated at the bedside. No acute overnight events occurred.     ----  PAST MEDICAL & SURGICAL HISTORY:  No pertinent past medical history  History of vocal cord polypectomy  Benign parotid tumor: 1983  History of appendectomy: 1965      FAMILY HISTORY:  Family history of CVA (Father)  Family history of myocardial infarction (Father)  Family history of type 2 diabetes mellitus (Father)      ----  MEDICATIONS  (STANDING):  ARIPiprazole 2 milliGRAM(s) Oral daily  folic acid 1 milliGRAM(s) Oral daily  mirtazapine 30 milliGRAM(s) Oral at bedtime  pantoprazole    Tablet 40 milliGRAM(s) Oral before breakfast  PARoxetine 30 milliGRAM(s) Oral daily  polyethylene glycol 3350 17 Gram(s) Oral two times a day  rivaroxaban 15 milliGRAM(s) Oral two times a day with meals  senna 2 Tablet(s) Oral at bedtime    MEDICATIONS  (PRN):      ----  REVIEW OF SYSTEMS:  CONSTITUTIONAL: denies fever, chills, fatigue, weakness  HEENT: denies blurred vision, sore throat  SKIN: denies new lesions, rash  CARDIOVASCULAR: denies chest pain, chest pressure, palpitations  RESPIRATORY: denies shortness of breath, sputum production  GASTROINTESTINAL: denies nausea, vomiting, diarrhea, abdominal pain  GENITOURINARY: denies dysuria, discharge  NEUROLOGICAL: denies numbness, headache, focal weakness  MUSCULOSKELETAL: denies new joint pain, muscle aches  HEMATOLOGIC: denies gross bleeding, bruising  LYMPHATICS: denies enlarged lymph nodes, extremity swelling  PSYCHIATRIC: denies recent changes in anxiety, depression  ENDOCRINOLOGIC: denies sweating, cold or heat intolerance    ----  PHYSICAL EXAM:  GENERAL: patient appears well, no acute distress, appropriate, pleasant  EYES: sclera clear, no exudates  ENMT: oropharynx clear without erythema, no exudates, moist mucous membranes  NECK: supple, soft, no thyromegaly noted  LUNGS: good air entry bilaterally, clear to auscultation, symmetric breath sounds, no wheezing or rhonchi appreciated  HEART: soft S1/S2, regular rate and rhythm, no murmurs noted, no lower extremity edema  GASTROINTESTINAL: abdomen is soft, nontender, nondistended, normoactive bowel sounds, no palpable masses  INTEGUMENT: good skin turgor, no lesions noted  MUSCULOSKELETAL: no clubbing or cyanosis, no obvious deformity  NEUROLOGIC: awake, alert, oriented x3, good muscle tone in 4 extremities, no obvious sensory deficits  PSYCHIATRIC: mood is good, affect is congruent, linear and logical thought process  HEME/LYMPH: no palpable supraclavicular nodules, no obvious ecchymosis or petechiae     T(C): 36.6 (05-15-20 @ 04:49), Max: 36.6 (05-15-20 @ 04:49)  HR: 78 (05-15-20 @ 04:49) (78 - 87)  BP: 101/66 (05-15-20 @ 04:49) (94/58 - 110/69)  RR: 17 (05-15-20 @ 04:49) (17 - 17)  SpO2: 95% (05-15-20 @ 04:49) (94% - 96%)  Wt(kg): --    ----  I&O's Summary    14 May 2020 07:01  -  15 May 2020 07:00  --------------------------------------------------------  IN: 860 mL / OUT: 225 mL / NET: 635 mL        LABS:                        12.5   6.33  )-----------( 237      ( 15 May 2020 07:05 )             36.8     05-15    141  |  106  |  19  ----------------------------<  85  3.3<L>   |  27  |  1.20    Ca    8.3<L>      15 May 2020 07:05          CAPILLARY BLOOD GLUCOSE                    ----  Personally reviewed:  Vital sign trends: [ X ] yes    [  ] no     [  ] n/a  Laboratory results: [X  ] yes    [  ] no     [  ] n/a  Radiology results: [X  ] yes    [  ] no     [  ] n/a  Culture results: [  ] yes    [  ] no     [  ] n/a  Consultant recommendations: [X  ] yes    [  ] no     [  ] n/a Patient is a 64y old  Male who presents with a chief complaint of Weakness (14 May 2020 11:32)      FROM ADMISSION H+P:   HPI:  Patient is a 64/M without any significant known PMHx who presented to the ED c/o worsening weakness over the past 1 month with associated chills that he reports began on 3/30/20. Since that time he reports progressive weakness, fatigue, laying in bed for the majority of the day, difficulty with ambulation, and poor PO intake as he reports he no longer felt he had the desire to eat. He denies any associated SOB, cough, PARDO, but reports difficulty with ambulation as he feels his legs are weak. He reports a fall approx 2 weeks ago which occurred in his apartment which was preceded by dizziness which was described as a feeling of unsteadiness, in which he lost consciousness for an unknown period of time and awoke on the floor. denies any preceding palpitations, SOB, or chest pain. Of note, the patient has not been evaluated by a physician in approx 30 years. He attributes his dizziness and syncope to poor PO intake, often citing during the interview that he has "lost the will to live," and would "be better off dead." He denies any concrete plan to commit suicide, and denies any access to firearms in the home. He reports occasional diarrhea, approx 1x/week, but also has normal formed stools, currently denies any diarrhea. Denies any abdominal pain, cramping, nausea or vomiting. Denies any recent travel or sick contacts.     ED Vitals:   T 98.9  BP 92/62 RR 18 SpO2 93% on RA  Labs: CBC wnl, No electrolyte abnormalities, TBili 2.2, troponin negative x 1, blood EtOH, tylenol, ASA level wnl, COVID19 PCr negative.  CT Chest/AP: No traumatic injury. Extensive nonobstructing left renal stones, including a partial staghorn calculus.  CT Head: No acute intracranial hemorrhage  CT C spine: Degenerative changes, no acute fracture  EKG: Sinus tachycardia at 110  In the ED Patient received 2L NS bolus (08 May 2020 00:50)      ----  INTERVAL HPI/OVERNIGHT EVENTS: Pt seen and evaluated at the bedside. No acute overnight events occurred.     ----  PAST MEDICAL & SURGICAL HISTORY:  No pertinent past medical history  History of vocal cord polypectomy  Benign parotid tumor: 1983  History of appendectomy: 1965      FAMILY HISTORY:  Family history of CVA (Father)  Family history of myocardial infarction (Father)  Family history of type 2 diabetes mellitus (Father)      ----  MEDICATIONS  (STANDING):  ARIPiprazole 2 milliGRAM(s) Oral daily  folic acid 1 milliGRAM(s) Oral daily  mirtazapine 30 milliGRAM(s) Oral at bedtime  pantoprazole    Tablet 40 milliGRAM(s) Oral before breakfast  PARoxetine 30 milliGRAM(s) Oral daily  polyethylene glycol 3350 17 Gram(s) Oral two times a day  rivaroxaban 15 milliGRAM(s) Oral two times a day with meals  senna 2 Tablet(s) Oral at bedtime    MEDICATIONS  (PRN):      ----  REVIEW OF SYSTEMS:  CONSTITUTIONAL: admits fatigue. No fever, weight loss,   RESPIRATORY: No cough, wheezing, chills or hemoptysis; No shortness of breath  CARDIOVASCULAR: No chest pain, palpitations, dizziness, or leg swelling  GASTROINTESTINAL: No abdominal or epigastric pain. No nausea, vomiting, or hematemesis; No diarrhea or constipation. No melena or hematochezia.  GENITOURINARY: No dysuria, frequency, hematuria, or incontinence  NEUROLOGICAL: No headaches, memory loss, loss of strength, numbness, or tremors  SKIN: No itching, burning, rashes, or lesions   MUSCULOSKELETAL: No joint pain or swelling; No muscle, back, or extremity pain  PSYCHIATRIC: ++severe depression, intermittent suicidal ideation, denies currently      PHYSICAL EXAM:  GENERAL: NAD, flat affect, labile, cooperative on exam and history  NERVOUS SYSTEM:  Alert & Oriented X3,  Motor Strength 4/5 B/L upper and lower extremities  CHEST/LUNG: Clear to percussion bilaterally; No rales, rhonchi, wheezing, or rubs  HEART: Regular rate and rhythm; No murmurs, rubs, or gallops  ABDOMEN: Soft, Nontender, Nondistended; Bowel sounds present  EXTREMITIES:  2+ Peripheral Pulses, No clubbing, cyanosis, or edema  SKIN: No rashes or lesions    T(C): 36.6 (05-15-20 @ 04:49), Max: 36.6 (05-15-20 @ 04:49)  HR: 78 (05-15-20 @ 04:49) (78 - 87)  BP: 101/66 (05-15-20 @ 04:49) (94/58 - 110/69)  RR: 17 (05-15-20 @ 04:49) (17 - 17)  SpO2: 95% (05-15-20 @ 04:49) (94% - 96%)  Wt(kg): --    ----  I&O's Summary    14 May 2020 07:01  -  15 May 2020 07:00  --------------------------------------------------------  IN: 860 mL / OUT: 225 mL / NET: 635 mL        LABS:                        12.5   6.33  )-----------( 237      ( 15 May 2020 07:05 )             36.8     05-15    141  |  106  |  19  ----------------------------<  85  3.3<L>   |  27  |  1.20    Ca    8.3<L>      15 May 2020 07:05          CAPILLARY BLOOD GLUCOSE                    ----  Personally reviewed:  Vital sign trends: [ X ] yes    [  ] no     [  ] n/a  Laboratory results: [X  ] yes    [  ] no     [  ] n/a  Radiology results: [X  ] yes    [  ] no     [  ] n/a  Consultant recommendations: [X  ] yes    [  ] no     [  ] n/a

## 2020-05-15 NOTE — PROGRESS NOTE ADULT - PROBLEM SELECTOR PLAN 3
- episode of hematuria CT abdomen significant for L renal stones and partial staghorn calculus initially but repeat -CT pre and post contrast- Enhancing left renal mass suspicious for solid neoplasm, spoke with uro on call Dr Otto, nonemergent workup at this time, f/u as an outpatient with primary urologist  - Noted heme/onc eval note -- currently active DVT w/ family hx suspicious of underlying hypercoag etiology, will need outpatient follow up   - will continue xarelto at present and monitor closely for hematuria  - monitor H/H  - Will eventually need referal to tertiary center as o/p for percutaneous nephrolithotomy per   -D/w Dr. Valentina FONTANA, Ok to continue Xarelto

## 2020-05-15 NOTE — PROGRESS NOTE ADULT - ASSESSMENT
64/M without any significant known PMHx who presented to the ED c/o worsening weakness over the past 1 month with associated chills, and syncope, admitted for r/o COVID19 and syncope workup. Also found to have depression and suicidal ideation with significant social concerns Course complicated by hematuria on Xarelto, now resolved with recs from uro for outpt workup on remaining gu issues including renal mass eval

## 2020-05-15 NOTE — PROGRESS NOTE ADULT - PROBLEM SELECTOR PROBLEM 1
Hyperbilirubinemia
Renal mass, left
Deep vein thrombosis (DVT) of popliteal vein of right lower extremity, unspecified chronicity
Deep vein thrombosis (DVT) of popliteal vein of right lower extremity, unspecified chronicity
Syncope and collapse

## 2020-05-15 NOTE — PROGRESS NOTE ADULT - PROBLEM SELECTOR PROBLEM 8
Need for prophylactic measure
Need for prophylactic measure
Verruca plantaris

## 2020-05-15 NOTE — PROGRESS NOTE ADULT - PROBLEM SELECTOR PLAN 10
- disposition Discussed with patients brother in law. Pt is currently living in an unsafe environment with no support system. Brother in law is in process of cleaning house and ordering furniture.   - Plan for d/c to inpatient psych, pt medically optimized and stable for discharge. Family in agreement with plan

## 2020-05-15 NOTE — PROGRESS NOTE ADULT - ATTENDING COMMENTS
----- Message from Laurie Cardona sent at 3/4/2020  3:58 PM CST -----  Florinda from [employer] can be reached at [phone] ext [phone]      Florinda callguerda to check status of pt disability paperwork that was submitted.    Thank you!  
Informed Florinda at [employer] and informed Florinda that Dr. Cade does not do long term disability paperwork.   
Chart reviewed    Patient seen and examined    Agree with plan as outlined above
Chart reviewed    Patient seen and examined    Agree with plan as outlined above
I personally saw and examined the patient in detail.  I have spoken to the above provider regarding the assessment and plan of care.  I reviewed the above assessment and plan of care, and agree.  I have made changes in the body of the note where appropriate.
Seen/examined. agree with above.
The patient was personally seen and examined, in addition to being examined and evaluated by NP.  All elements of the note were edited where appropriate.
I saw and examined the patient personally. Spoke with above provider regarding this case. I reviewed the above findings completely.  I agree with the above history, physical, and plan which I have edited where appropriate.
Chart reviewed    Patient seen and examined    Agree with plan as outlined above
Advanced care planning was discussed with patient and family.  Advanced care planning forms were reviewed and discussed.  Risks, benefits and alternatives of gastroenterologic procedures were discussed in detail and all questions were answered.    30 minutes spent.
I personally conducted a physical examination of the patient. I personally gathered the patient's history. I edited the above listed findings which were prepared by the listed resident physician. I personally discussed the plan of care with the patient. The questions and concerns were addressed to the best of my ability. The patient is in agreement with the listed treatment plan.
pt appears medically stable for discharge for inpt psych hospitalization for further care and management of severe depression tomorrow
I personally conducted a physical examination of the patient. I personally gathered the patient's history. I edited the above listed findings which were prepared by the listed resident physician. I personally discussed the plan of care with the patient. The questions and concerns were addressed to the best of my ability. The patient is in agreement with the listed treatment plan. Urology follow up and if no plan for further intervention will go to inpatient [facility]. D/w Dr. Conley
I personally conducted a physical examination of the patient. I personally gathered the patient's history. I personally discussed the plan of care with the patient. The questions and concerns were addressed to the best of my ability. The patient is in agreement with the listed treatment plan.
I personally conducted a physical examination of the patient. I personally gathered the patient's history. I edited the above listed findings which were prepared by the listed resident physician. I personally discussed the plan of care with the patient. The questions and concerns were addressed to the best of my ability. The patient is in agreement with the listed treatment plan.
I personally conducted a physical examination of the patient. I personally gathered the patient's history. I edited the above listed findings which were prepared by the listed resident physician. I personally discussed the plan of care with the patient. The questions and concerns were addressed to the best of my ability. The patient is in agreement with the listed treatment plan.
enhanced supervision for odd affect today

## 2020-05-15 NOTE — PROGRESS NOTE ADULT - PROBLEM SELECTOR PROBLEM 4
DVT (deep venous thrombosis)
Major depressive episode
Major depressive episode
Weakness

## 2020-05-15 NOTE — PROGRESS NOTE ADULT - ASSESSMENT
64 year old male without any significant known PMHx who presented to the ED c/o worsening weakness over the past 1 month with associated chills, and syncope, admitted for r/o COVID19 and syncope workup. Also found to have depression and suicidal, found to have right leg DVT probably provoked related to immobility, has been bridged to Xarelto     Syncope  - No evidence of arrythmia on tele or EKG, Unlikely cardiac etiology   - Encourage PO hydration. Likely related to poor PO intake  - TTE was difficult and incomplete study with normal LV function, unable to evaluate valvular pathology   - Troponin negative     DVT  - Per primary team  - Continue w/ rivaroxaban    - From a cardiac standpoint the patient may be discharged home  - Monitor and replete lytes, keep K>4, Mg>2.  - All other medical needs as per primary team.  - Other cardiovascular workup will depend on clinical course.  - Will continue to follow.    MS SUNDAY PelayoP, [facility]  Nurse Practitioner- Cardiology   Spectra #[phone]

## 2020-05-15 NOTE — PROGRESS NOTE ADULT - PROBLEM SELECTOR PROBLEM 6
Onychomycosis
Onychomycosis
Suicidal ideation

## 2020-05-16 PROBLEM — Z78.9 OTHER SPECIFIED HEALTH STATUS: Chronic | Status: ACTIVE | Noted: 2020-05-07

## 2020-05-16 LAB
ANION GAP SERPL CALC-SCNC: 13 MMO/L — SIGNIFICANT CHANGE UP (ref 7–14)
BASOPHILS # BLD AUTO: 0.03 K/UL — SIGNIFICANT CHANGE UP (ref 0–0.2)
BASOPHILS NFR BLD AUTO: 0.5 % — SIGNIFICANT CHANGE UP (ref 0–2)
BUN SERPL-MCNC: 19 MG/DL — SIGNIFICANT CHANGE UP (ref 7–23)
CALCIUM SERPL-MCNC: 8.7 MG/DL — SIGNIFICANT CHANGE UP (ref 8.4–10.5)
CHLORIDE SERPL-SCNC: 102 MMOL/L — SIGNIFICANT CHANGE UP (ref 98–107)
CO2 SERPL-SCNC: 24 MMOL/L — SIGNIFICANT CHANGE UP (ref 22–31)
CREAT SERPL-MCNC: 1.14 MG/DL — SIGNIFICANT CHANGE UP (ref 0.5–1.3)
EOSINOPHIL # BLD AUTO: 0.06 K/UL — SIGNIFICANT CHANGE UP (ref 0–0.5)
EOSINOPHIL NFR BLD AUTO: 1 % — SIGNIFICANT CHANGE UP (ref 0–6)
GLUCOSE SERPL-MCNC: 106 MG/DL — HIGH (ref 70–99)
HCT VFR BLD CALC: 38.4 % — LOW (ref 39–50)
HGB BLD-MCNC: 13.2 G/DL — SIGNIFICANT CHANGE UP (ref 13–17)
IMM GRANULOCYTES NFR BLD AUTO: 0.5 % — SIGNIFICANT CHANGE UP (ref 0–1.5)
LYMPHOCYTES # BLD AUTO: 1.1 K/UL — SIGNIFICANT CHANGE UP (ref 1–3.3)
LYMPHOCYTES # BLD AUTO: 19 % — SIGNIFICANT CHANGE UP (ref 13–44)
MCHC RBC-ENTMCNC: 30.2 PG — SIGNIFICANT CHANGE UP (ref 27–34)
MCHC RBC-ENTMCNC: 34.4 % — SIGNIFICANT CHANGE UP (ref 32–36)
MCV RBC AUTO: 87.9 FL — SIGNIFICANT CHANGE UP (ref 80–100)
MONOCYTES # BLD AUTO: 0.45 K/UL — SIGNIFICANT CHANGE UP (ref 0–0.9)
MONOCYTES NFR BLD AUTO: 7.8 % — SIGNIFICANT CHANGE UP (ref 2–14)
NEUTROPHILS # BLD AUTO: 4.11 K/UL — SIGNIFICANT CHANGE UP (ref 1.8–7.4)
NEUTROPHILS NFR BLD AUTO: 71.2 % — SIGNIFICANT CHANGE UP (ref 43–77)
NRBC # FLD: 0 K/UL — SIGNIFICANT CHANGE UP (ref 0–0)
PLATELET # BLD AUTO: 268 K/UL — SIGNIFICANT CHANGE UP (ref 150–400)
PMV BLD: 9.7 FL — SIGNIFICANT CHANGE UP (ref 7–13)
POTASSIUM SERPL-MCNC: 3.6 MMOL/L — SIGNIFICANT CHANGE UP (ref 3.5–5.3)
POTASSIUM SERPL-SCNC: 3.6 MMOL/L — SIGNIFICANT CHANGE UP (ref 3.5–5.3)
RBC # BLD: 4.37 M/UL — SIGNIFICANT CHANGE UP (ref 4.2–5.8)
RBC # FLD: 13.1 % — SIGNIFICANT CHANGE UP (ref 10.3–14.5)
SODIUM SERPL-SCNC: 139 MMOL/L — SIGNIFICANT CHANGE UP (ref 135–145)
WBC # BLD: 5.78 K/UL — SIGNIFICANT CHANGE UP (ref 3.8–10.5)
WBC # FLD AUTO: 5.78 K/UL — SIGNIFICANT CHANGE UP (ref 3.8–10.5)

## 2020-05-16 PROCEDURE — 93010 ELECTROCARDIOGRAM REPORT: CPT

## 2020-05-16 PROCEDURE — 99222 1ST HOSP IP/OBS MODERATE 55: CPT

## 2020-05-16 PROCEDURE — 99223 1ST HOSP IP/OBS HIGH 75: CPT

## 2020-05-16 RX ADMIN — RIVAROXABAN 15 MILLIGRAM(S): KIT at 17:25

## 2020-05-16 RX ADMIN — Medication 30 MILLIGRAM(S): at 09:48

## 2020-05-16 RX ADMIN — MIRTAZAPINE 30 MILLIGRAM(S): 45 TABLET, ORALLY DISINTEGRATING ORAL at 20:08

## 2020-05-16 RX ADMIN — Medication 1 MILLIGRAM(S): at 09:48

## 2020-05-16 RX ADMIN — RIVAROXABAN 15 MILLIGRAM(S): KIT at 09:48

## 2020-05-16 RX ADMIN — PANTOPRAZOLE SODIUM 40 MILLIGRAM(S): 20 TABLET, DELAYED RELEASE ORAL at 09:48

## 2020-05-16 NOTE — CONSULT NOTE ADULT - ASSESSMENT
64 y.o. M with no significant PMHX, admitted to [facility] for syncope, FTT,  found to have depression and a left renal mass, likely malignancy, left renal staghorn stone, DVT. Pt transferred to [facility] for further management of Depression.    1. Syncope: S/p Cardiac workup, no significant finding, Echo showed grossly normal LVF. Likely due to dehydration from FTT.  -Fall precaution  -Encourage PO intake.    2. Renal mass, likely neoplasm seen by Urology, rec outpatient f/u with Dr Mo for surgical resection  -Monitor for hematuria, may need ED transfer if pt developed gross hematuria.  -Outpatient f/u with Dr Mo    3. Renal stone:  -F/U with Dr Mo Urology as an outpatient     4. DVT on Xarelto   -Cont Xarelto 15mg Q12 H    5. Hematuria: as per pt pt has hematuria on and off for the last two months, likely due to his renal mass and stone  -Monitor for gross hematuria.  -Consider ED transfer if pt develops gross hematuria.     6. Depression:  -Management as per Psych 64 y.o. M with no significant PMHX, admitted to [facility] for syncope, FTT,  found to have depression and a left renal mass, r/o malignancy, left renal staghorn stone, DVT. Pt transferred to [facility] for further management of Depression.    1. Syncope: S/p Cardiac workup, no significant finding, Echo showed grossly normal LVF. Likely due to dehydration from FTT.  -Fall precaution  -Encourage PO intake.    2. Renal mass, likely neoplasm seen by Urology, rec outpatient f/u with Dr Mo for surgical resection  -Monitor for hematuria, may need ED transfer if pt developed gross hematuria.  -Outpatient f/u with Dr Mo    3. Renal stone:  -F/U with Dr Mo Urology as an outpatient     4. DVT on Xarelto   -Cont Xarelto 15mg Q12 H    5. Hematuria: as per pt pt has hematuria on and off for the last two months, likely due to his renal mass and stone  -Monitor for gross hematuria.  -Consider ED transfer if pt develops gross hematuria.     6. Depression:  -Management as per Psych

## 2020-05-16 NOTE — CONSULT NOTE ADULT - SUBJECTIVE AND OBJECTIVE BOX
Pt seen and examined with a mental health worker  HPI: 64 y.o. M with no significant PMHX, admitted to [facility] for syncope, FTT, found to have hematuria due to a renal mass and staghorn renal stone, seen by Urology rec. outpatient f/u for partial nephrectomy. Also found to have DVT on Xarelto. Pt transferred to [facility] for further management of depression. Currently, pt has no complaints, denies any fever, chills, no Hematuria. As per RN pt has h/o hemorrhoids started on Anusol as per Psych team.        PAST MEDICAL & SURGICAL HISTORY:  No pertinent past medical history  History of vocal cord polypectomy  Benign parotid tumor: 1983  History of appendectomy: 1965  Hemorrhoids      Review of Systems:   CONSTITUTIONAL: No fever, (+)  fatigue  EYES: No eye pain, visual disturbances, or discharge  ENMT:  No difficulty hearing, tinnitus, vertigo; No sinus or throat pain  NECK: No pain or stiffness  RESPIRATORY: No cough, wheezing, chills or hemoptysis; No shortness of breath  CARDIOVASCULAR: No chest pain, palpitations, dizziness, or leg swelling  GASTROINTESTINAL: No abdominal or epigastric pain. No nausea, vomiting, or hematemesis; No diarrhea or constipation. No melena or hematochezia.  GENITOURINARY: No dysuria, frequency, hematuria, or incontinence  NEUROLOGICAL: No headaches, memory loss, loss of strength, numbness, or tremors  SKIN: No itching, burning, rashes, or lesions   LYMPH NODES: No enlarged glands  ENDOCRINE: No heat or cold intolerance; No hair loss  MUSCULOSKELETAL: No joint pain or swelling; No muscle, back, or extremity pain  HEME/LYMPH: No easy bruising, or bleeding gums  ALLERY AND IMMUNOLOGIC: No hives or eczema    Allergies    No Known Allergies    Intolerances        Social History: Denies any h/o alcohol, tobacco or illicit drug abuse.     FAMILY HISTORY:  Family history of CVA (Father)  Family history of myocardial infarction (Father)  Family history of type 2 diabetes mellitus (Father)      MEDICATIONS  (STANDING):  folic acid 1 milliGRAM(s) Oral daily  hydrocortisone 2.5% Rectal Cream 1 Application(s) Rectal daily  mirtazapine 30 milliGRAM(s) Oral at bedtime  pantoprazole    Tablet 40 milliGRAM(s) Oral before breakfast  PARoxetine 30 milliGRAM(s) Oral daily  polyethylene glycol 3350 17 Gram(s) Oral two times a day  rivaroxaban 15 milliGRAM(s) Oral two times a day with meals  senna 2 Tablet(s) Oral at bedtime    MEDICATIONS  (PRN):  haloperidol     Tablet 0.5 milliGRAM(s) Oral every 6 hours PRN Agitation  haloperidol    Injectable 1 milliGRAM(s) IntraMuscular once PRN Severe agitation  LORazepam     Tablet 0.5 milliGRAM(s) Oral every 6 hours PRN Agitation  LORazepam   Injectable 1 milliGRAM(s) IntraMuscular once PRN Agitation      Vital Signs Last 24 Hrs  T(C): 36.9 (16 May 2020 08:37), Max: 37 (15 May 2020 21:45)  T(F): 98.4 (16 May 2020 08:37), Max: 98.6 (15 May 2020 21:45)  HR: 88 (16 May 2020 08:37) (81 - 88)  BP: 90/60 (16 May 2020 08:37) (90/60 - 105/62)  BP(mean): --  RR: 17 (15 May 2020 13:12) (17 - 17)  SpO2: 95% (15 May 2020 13:12) (95% - 95%)  CAPILLARY BLOOD GLUCOSE            PHYSICAL EXAM:  GENERAL: NAD, well-developed  HEAD:  Atraumatic, Normocephalic  EYES: EOMI, conjunctiva and sclera clear  NECK: Supple, No JVD  CHEST/LUNG: Clear to auscultation bilaterally; No wheeze  HEART: Regular rate and rhythm; No murmurs, rubs, or gallops  ABDOMEN: Soft, Nontender, Nondistended; Bowel sounds present  EXTREMITIES:  2+ Peripheral Pulses, No clubbing, cyanosis, or edema  NEUROLOGY: non-focal  SKIN: No rashes or lesions    LABS:                        12.5   6.33  )-----------( 237      ( 15 May 2020 07:05 )             36.8     05-15    141  |  106  |  19  ----------------------------<  85  3.3<L>   |  27  |  1.20    Ca    8.3<L>      15 May 2020 07:05        COVID-19 PCR . (05.07.20 @ 18:41)    COVID-19 PCR: NotDetec: This test has been validated by [facility] to be accurate;  though it has not been FDA cleared/approved by the usual pathway  As with all laboratory test, results should be correlated with clinical  findings.  https://www.fda.gov/media/116662/download  https://www.fda.gov/media/807981/download            EKG(personally reviewed):    RADIOLOGY & ADDITIONAL TESTS:  < from: CT Abdomen and Pelvis w/wo IV Cont (05.11.20 @ 13:56) >  IMPRESSION:     Absence of enteric contrast limits evaluation of the GI tract.     Enhancing left renal mass suspicious for solid neoplasm.    Left renal calculi reidentified. Small volume of high density in the bladder may represent small volume of hemorrhage, milk of calcium or calculi. In light of hematuria, recommend cystoscopy to exclude bladder pathology.    Ascending colitis.    Bilateral lower lobe discoid atelectasis and mild left lower lobe groundglass density. Developing trace left pleural effusion. Rule out developing pneumonia.    Trace ascites of unclear etiology.    Cholelithiasis.    Message left with floor nurse Meera, who is contacting Dr. Pritchett to return my call at 5/11/2020 2:28 PM with readback.                   ***Please see the addendum at the top of this report. It may contain additional important information or changes.****          < end of copied text >  < from: US Duplex Venous Lower Ext Complete, Bilateral (05.08.20 @ 09:17) >  FINDINGS:    There is normal compressibility of the bilateral common femoral, femoral and left popliteal veins. The right popliteal vein is enlarged,incompressible and demonstrates intraluminal echogenic material. Similar findings are noted in the right posterior tibial and peroneal veins.    Doppler examination shows normal spontaneous and phasic flow.    Left calf veins are patent.    IMPRESSION:   Above and below the knee acute deep venous thrombosis on the right.  No evidence of deep venous thrombosis in the left lower extremity.    Findings discussed with DR. Brian Pritchett on 5/8/2020 9:34 AM with read back.                  ALISA GUZMAN M.D., ATTENDING RADIOLOGIST  This document has been electronically signed. May  8 2020  9:34AM    < end of copied text >    Imaging Personally Reviewed:    Consultant(s) Notes Reviewed:  Urology, Neurology, Podiatry, Cardiology     Care Discussed with Consultants/Other Providers: Cardiology consult.

## 2020-05-17 PROCEDURE — 99232 SBSQ HOSP IP/OBS MODERATE 35: CPT

## 2020-05-17 RX ADMIN — PANTOPRAZOLE SODIUM 40 MILLIGRAM(S): 20 TABLET, DELAYED RELEASE ORAL at 10:01

## 2020-05-17 RX ADMIN — RIVAROXABAN 15 MILLIGRAM(S): KIT at 10:02

## 2020-05-17 RX ADMIN — RIVAROXABAN 15 MILLIGRAM(S): KIT at 17:16

## 2020-05-17 RX ADMIN — Medication 1 MILLIGRAM(S): at 10:00

## 2020-05-17 RX ADMIN — Medication 30 MILLIGRAM(S): at 10:02

## 2020-05-17 RX ADMIN — MIRTAZAPINE 30 MILLIGRAM(S): 45 TABLET, ORALLY DISINTEGRATING ORAL at 20:26

## 2020-05-18 PROCEDURE — 99232 SBSQ HOSP IP/OBS MODERATE 35: CPT

## 2020-05-18 RX ADMIN — Medication 1 APPLICATION(S): at 09:15

## 2020-05-18 RX ADMIN — MIRTAZAPINE 30 MILLIGRAM(S): 45 TABLET, ORALLY DISINTEGRATING ORAL at 20:52

## 2020-05-18 RX ADMIN — Medication 1 MILLIGRAM(S): at 09:15

## 2020-05-18 RX ADMIN — RIVAROXABAN 15 MILLIGRAM(S): KIT at 09:16

## 2020-05-18 RX ADMIN — Medication 30 MILLIGRAM(S): at 09:16

## 2020-05-18 RX ADMIN — PANTOPRAZOLE SODIUM 40 MILLIGRAM(S): 20 TABLET, DELAYED RELEASE ORAL at 07:35

## 2020-05-18 RX ADMIN — SENNA PLUS 2 TABLET(S): 8.6 TABLET ORAL at 20:52

## 2020-05-18 RX ADMIN — RIVAROXABAN 15 MILLIGRAM(S): KIT at 17:19

## 2020-05-18 RX ADMIN — POLYETHYLENE GLYCOL 3350 17 GRAM(S): 17 POWDER, FOR SOLUTION ORAL at 09:16

## 2020-05-19 PROCEDURE — 99232 SBSQ HOSP IP/OBS MODERATE 35: CPT

## 2020-05-19 RX ADMIN — RIVAROXABAN 15 MILLIGRAM(S): KIT at 17:15

## 2020-05-19 RX ADMIN — Medication 1 MILLIGRAM(S): at 09:43

## 2020-05-19 RX ADMIN — Medication 30 MILLIGRAM(S): at 09:43

## 2020-05-19 RX ADMIN — SENNA PLUS 2 TABLET(S): 8.6 TABLET ORAL at 20:34

## 2020-05-19 RX ADMIN — MIRTAZAPINE 30 MILLIGRAM(S): 45 TABLET, ORALLY DISINTEGRATING ORAL at 20:34

## 2020-05-19 RX ADMIN — Medication 1 APPLICATION(S): at 09:43

## 2020-05-19 RX ADMIN — RIVAROXABAN 15 MILLIGRAM(S): KIT at 09:42

## 2020-05-19 RX ADMIN — PANTOPRAZOLE SODIUM 40 MILLIGRAM(S): 20 TABLET, DELAYED RELEASE ORAL at 07:37

## 2020-05-19 RX ADMIN — POLYETHYLENE GLYCOL 3350 17 GRAM(S): 17 POWDER, FOR SOLUTION ORAL at 09:42

## 2020-05-20 PROCEDURE — 99232 SBSQ HOSP IP/OBS MODERATE 35: CPT

## 2020-05-20 PROCEDURE — 90832 PSYTX W PT 30 MINUTES: CPT

## 2020-05-20 RX ADMIN — Medication 0.5 MILLIGRAM(S): at 20:56

## 2020-05-20 RX ADMIN — RIVAROXABAN 15 MILLIGRAM(S): KIT at 09:31

## 2020-05-20 RX ADMIN — RIVAROXABAN 15 MILLIGRAM(S): KIT at 18:37

## 2020-05-20 RX ADMIN — Medication 30 MILLIGRAM(S): at 09:31

## 2020-05-20 RX ADMIN — MIRTAZAPINE 30 MILLIGRAM(S): 45 TABLET, ORALLY DISINTEGRATING ORAL at 20:56

## 2020-05-20 RX ADMIN — PANTOPRAZOLE SODIUM 40 MILLIGRAM(S): 20 TABLET, DELAYED RELEASE ORAL at 09:31

## 2020-05-20 RX ADMIN — Medication 1 MILLIGRAM(S): at 09:30

## 2020-05-20 RX ADMIN — Medication 0.5 MILLIGRAM(S): at 09:31

## 2020-05-21 PROCEDURE — 99232 SBSQ HOSP IP/OBS MODERATE 35: CPT

## 2020-05-21 RX ADMIN — SENNA PLUS 2 TABLET(S): 8.6 TABLET ORAL at 20:13

## 2020-05-21 RX ADMIN — Medication 30 MILLIGRAM(S): at 09:42

## 2020-05-21 RX ADMIN — MIRTAZAPINE 30 MILLIGRAM(S): 45 TABLET, ORALLY DISINTEGRATING ORAL at 20:14

## 2020-05-21 RX ADMIN — Medication 0.5 MILLIGRAM(S): at 20:14

## 2020-05-21 RX ADMIN — PANTOPRAZOLE SODIUM 40 MILLIGRAM(S): 20 TABLET, DELAYED RELEASE ORAL at 09:42

## 2020-05-21 RX ADMIN — RIVAROXABAN 15 MILLIGRAM(S): KIT at 17:42

## 2020-05-21 RX ADMIN — Medication 0.5 MILLIGRAM(S): at 09:42

## 2020-05-21 RX ADMIN — Medication 1 MILLIGRAM(S): at 09:42

## 2020-05-21 RX ADMIN — RIVAROXABAN 15 MILLIGRAM(S): KIT at 09:42

## 2020-05-22 PROCEDURE — 99232 SBSQ HOSP IP/OBS MODERATE 35: CPT

## 2020-05-22 RX ADMIN — MIRTAZAPINE 30 MILLIGRAM(S): 45 TABLET, ORALLY DISINTEGRATING ORAL at 20:32

## 2020-05-22 RX ADMIN — Medication 1 MILLIGRAM(S): at 08:17

## 2020-05-22 RX ADMIN — PANTOPRAZOLE SODIUM 40 MILLIGRAM(S): 20 TABLET, DELAYED RELEASE ORAL at 07:15

## 2020-05-22 RX ADMIN — POLYETHYLENE GLYCOL 3350 17 GRAM(S): 17 POWDER, FOR SOLUTION ORAL at 08:18

## 2020-05-22 RX ADMIN — RIVAROXABAN 15 MILLIGRAM(S): KIT at 08:18

## 2020-05-22 RX ADMIN — Medication 0.5 MILLIGRAM(S): at 20:32

## 2020-05-22 RX ADMIN — Medication 30 MILLIGRAM(S): at 08:17

## 2020-05-22 RX ADMIN — RIVAROXABAN 15 MILLIGRAM(S): KIT at 17:22

## 2020-05-22 RX ADMIN — Medication 0.5 MILLIGRAM(S): at 08:17

## 2020-05-22 RX ADMIN — SENNA PLUS 2 TABLET(S): 8.6 TABLET ORAL at 20:32

## 2020-05-23 PROCEDURE — 99232 SBSQ HOSP IP/OBS MODERATE 35: CPT

## 2020-05-23 RX ADMIN — RIVAROXABAN 15 MILLIGRAM(S): KIT at 08:06

## 2020-05-23 RX ADMIN — Medication 1 MILLIGRAM(S): at 08:06

## 2020-05-23 RX ADMIN — PANTOPRAZOLE SODIUM 40 MILLIGRAM(S): 20 TABLET, DELAYED RELEASE ORAL at 08:02

## 2020-05-23 RX ADMIN — Medication 30 MILLIGRAM(S): at 08:06

## 2020-05-23 RX ADMIN — Medication 0.5 MILLIGRAM(S): at 08:06

## 2020-05-23 RX ADMIN — RIVAROXABAN 15 MILLIGRAM(S): KIT at 17:51

## 2020-05-23 RX ADMIN — POLYETHYLENE GLYCOL 3350 17 GRAM(S): 17 POWDER, FOR SOLUTION ORAL at 08:06

## 2020-05-23 RX ADMIN — MIRTAZAPINE 30 MILLIGRAM(S): 45 TABLET, ORALLY DISINTEGRATING ORAL at 20:10

## 2020-05-23 RX ADMIN — Medication 0.5 MILLIGRAM(S): at 20:10

## 2020-05-23 RX ADMIN — SENNA PLUS 2 TABLET(S): 8.6 TABLET ORAL at 20:10

## 2020-05-24 PROCEDURE — 99231 SBSQ HOSP IP/OBS SF/LOW 25: CPT

## 2020-05-24 RX ADMIN — Medication 30 MILLIGRAM(S): at 09:00

## 2020-05-24 RX ADMIN — POLYETHYLENE GLYCOL 3350 17 GRAM(S): 17 POWDER, FOR SOLUTION ORAL at 09:00

## 2020-05-24 RX ADMIN — RIVAROXABAN 15 MILLIGRAM(S): KIT at 09:00

## 2020-05-24 RX ADMIN — Medication 0.5 MILLIGRAM(S): at 09:00

## 2020-05-24 RX ADMIN — SENNA PLUS 2 TABLET(S): 8.6 TABLET ORAL at 20:25

## 2020-05-24 RX ADMIN — PANTOPRAZOLE SODIUM 40 MILLIGRAM(S): 20 TABLET, DELAYED RELEASE ORAL at 07:12

## 2020-05-24 RX ADMIN — Medication 0.5 MILLIGRAM(S): at 20:25

## 2020-05-24 RX ADMIN — RIVAROXABAN 15 MILLIGRAM(S): KIT at 17:39

## 2020-05-24 RX ADMIN — POLYETHYLENE GLYCOL 3350 17 GRAM(S): 17 POWDER, FOR SOLUTION ORAL at 20:25

## 2020-05-24 RX ADMIN — MIRTAZAPINE 30 MILLIGRAM(S): 45 TABLET, ORALLY DISINTEGRATING ORAL at 20:25

## 2020-05-24 RX ADMIN — Medication 1 MILLIGRAM(S): at 09:00

## 2020-05-25 PROCEDURE — 99231 SBSQ HOSP IP/OBS SF/LOW 25: CPT

## 2020-05-25 RX ADMIN — Medication 0.5 MILLIGRAM(S): at 20:13

## 2020-05-25 RX ADMIN — POLYETHYLENE GLYCOL 3350 17 GRAM(S): 17 POWDER, FOR SOLUTION ORAL at 08:42

## 2020-05-25 RX ADMIN — Medication 30 MILLILITER(S): at 01:01

## 2020-05-25 RX ADMIN — Medication 30 MILLIGRAM(S): at 08:42

## 2020-05-25 RX ADMIN — SENNA PLUS 2 TABLET(S): 8.6 TABLET ORAL at 20:13

## 2020-05-25 RX ADMIN — RIVAROXABAN 15 MILLIGRAM(S): KIT at 17:51

## 2020-05-25 RX ADMIN — MIRTAZAPINE 30 MILLIGRAM(S): 45 TABLET, ORALLY DISINTEGRATING ORAL at 20:13

## 2020-05-25 RX ADMIN — RIVAROXABAN 15 MILLIGRAM(S): KIT at 08:42

## 2020-05-25 RX ADMIN — Medication 0.5 MILLIGRAM(S): at 08:42

## 2020-05-25 RX ADMIN — PANTOPRAZOLE SODIUM 40 MILLIGRAM(S): 20 TABLET, DELAYED RELEASE ORAL at 07:13

## 2020-05-25 RX ADMIN — Medication 1 MILLIGRAM(S): at 08:42

## 2020-05-26 PROCEDURE — 99232 SBSQ HOSP IP/OBS MODERATE 35: CPT

## 2020-05-26 RX ADMIN — Medication 0.5 MILLIGRAM(S): at 08:37

## 2020-05-26 RX ADMIN — Medication 1 MILLIGRAM(S): at 08:37

## 2020-05-26 RX ADMIN — RIVAROXABAN 15 MILLIGRAM(S): KIT at 17:49

## 2020-05-26 RX ADMIN — PANTOPRAZOLE SODIUM 40 MILLIGRAM(S): 20 TABLET, DELAYED RELEASE ORAL at 08:37

## 2020-05-26 RX ADMIN — Medication 30 MILLIGRAM(S): at 08:37

## 2020-05-26 RX ADMIN — RIVAROXABAN 15 MILLIGRAM(S): KIT at 08:37

## 2020-05-26 RX ADMIN — MIRTAZAPINE 30 MILLIGRAM(S): 45 TABLET, ORALLY DISINTEGRATING ORAL at 20:14

## 2020-05-26 RX ADMIN — SENNA PLUS 2 TABLET(S): 8.6 TABLET ORAL at 20:14

## 2020-05-26 RX ADMIN — Medication 0.5 MILLIGRAM(S): at 20:14

## 2020-05-26 NOTE — PROGRESS NOTE BEHAVIORAL HEALTH - NS ED [facility] MED ROS ENDOCRINE
[General Appearance - Well Developed] : well developed [General Appearance - Well Nourished] : well nourished [General Appearance - In No Acute Distress] : no acute distress [Skin Color & Pigmentation] : normal skin color and pigmentation [Oriented To Time, Place, And Person] : oriented to person, place, and time No complaints

## 2020-05-27 PROCEDURE — 99232 SBSQ HOSP IP/OBS MODERATE 35: CPT

## 2020-05-27 RX ADMIN — Medication 30 MILLIGRAM(S): at 09:27

## 2020-05-27 RX ADMIN — MIRTAZAPINE 30 MILLIGRAM(S): 45 TABLET, ORALLY DISINTEGRATING ORAL at 20:46

## 2020-05-27 RX ADMIN — Medication 0.5 MILLIGRAM(S): at 13:16

## 2020-05-27 RX ADMIN — Medication 1 MILLIGRAM(S): at 09:27

## 2020-05-27 RX ADMIN — RIVAROXABAN 15 MILLIGRAM(S): KIT at 09:27

## 2020-05-27 RX ADMIN — PANTOPRAZOLE SODIUM 40 MILLIGRAM(S): 20 TABLET, DELAYED RELEASE ORAL at 09:27

## 2020-05-27 RX ADMIN — Medication 0.5 MILLIGRAM(S): at 09:27

## 2020-05-27 RX ADMIN — Medication 0.5 MILLIGRAM(S): at 20:45

## 2020-05-27 RX ADMIN — RIVAROXABAN 15 MILLIGRAM(S): KIT at 17:37

## 2020-05-27 RX ADMIN — SENNA PLUS 2 TABLET(S): 8.6 TABLET ORAL at 20:46

## 2020-05-28 PROCEDURE — 99232 SBSQ HOSP IP/OBS MODERATE 35: CPT

## 2020-05-28 RX ADMIN — Medication 0.5 MILLIGRAM(S): at 13:40

## 2020-05-28 RX ADMIN — PANTOPRAZOLE SODIUM 40 MILLIGRAM(S): 20 TABLET, DELAYED RELEASE ORAL at 07:54

## 2020-05-28 RX ADMIN — SENNA PLUS 2 TABLET(S): 8.6 TABLET ORAL at 20:20

## 2020-05-28 RX ADMIN — Medication 30 MILLIGRAM(S): at 09:17

## 2020-05-28 RX ADMIN — MIRTAZAPINE 30 MILLIGRAM(S): 45 TABLET, ORALLY DISINTEGRATING ORAL at 20:19

## 2020-05-28 RX ADMIN — RIVAROXABAN 15 MILLIGRAM(S): KIT at 17:19

## 2020-05-28 RX ADMIN — Medication 0.5 MILLIGRAM(S): at 20:19

## 2020-05-28 RX ADMIN — RIVAROXABAN 15 MILLIGRAM(S): KIT at 09:17

## 2020-05-28 RX ADMIN — Medication 1 MILLIGRAM(S): at 09:18

## 2020-05-28 RX ADMIN — Medication 0.5 MILLIGRAM(S): at 09:17

## 2020-05-29 PROCEDURE — 99232 SBSQ HOSP IP/OBS MODERATE 35: CPT

## 2020-05-29 RX ADMIN — Medication 0.5 MILLIGRAM(S): at 20:26

## 2020-05-29 RX ADMIN — Medication 30 MILLIGRAM(S): at 08:27

## 2020-05-29 RX ADMIN — SENNA PLUS 2 TABLET(S): 8.6 TABLET ORAL at 20:27

## 2020-05-29 RX ADMIN — POLYETHYLENE GLYCOL 3350 17 GRAM(S): 17 POWDER, FOR SOLUTION ORAL at 11:01

## 2020-05-29 RX ADMIN — RIVAROXABAN 15 MILLIGRAM(S): KIT at 08:01

## 2020-05-29 RX ADMIN — RIVAROXABAN 15 MILLIGRAM(S): KIT at 17:36

## 2020-05-29 RX ADMIN — Medication 1 MILLIGRAM(S): at 08:28

## 2020-05-29 RX ADMIN — Medication 0.5 MILLIGRAM(S): at 08:28

## 2020-05-29 RX ADMIN — MIRTAZAPINE 30 MILLIGRAM(S): 45 TABLET, ORALLY DISINTEGRATING ORAL at 20:27

## 2020-05-29 RX ADMIN — PANTOPRAZOLE SODIUM 40 MILLIGRAM(S): 20 TABLET, DELAYED RELEASE ORAL at 08:28

## 2020-05-29 RX ADMIN — Medication 0.5 MILLIGRAM(S): at 14:31

## 2020-05-30 RX ADMIN — SENNA PLUS 2 TABLET(S): 8.6 TABLET ORAL at 20:40

## 2020-05-30 RX ADMIN — POLYETHYLENE GLYCOL 3350 17 GRAM(S): 17 POWDER, FOR SOLUTION ORAL at 20:40

## 2020-05-30 RX ADMIN — Medication 0.5 MILLIGRAM(S): at 10:06

## 2020-05-30 RX ADMIN — Medication 30 MILLIGRAM(S): at 10:06

## 2020-05-30 RX ADMIN — Medication 0.5 MILLIGRAM(S): at 20:39

## 2020-05-30 RX ADMIN — Medication 0.5 MILLIGRAM(S): at 12:53

## 2020-05-30 RX ADMIN — PANTOPRAZOLE SODIUM 40 MILLIGRAM(S): 20 TABLET, DELAYED RELEASE ORAL at 10:06

## 2020-05-30 RX ADMIN — MIRTAZAPINE 30 MILLIGRAM(S): 45 TABLET, ORALLY DISINTEGRATING ORAL at 20:40

## 2020-05-30 RX ADMIN — Medication 1 MILLIGRAM(S): at 10:06

## 2020-05-31 RX ADMIN — MIRTAZAPINE 30 MILLIGRAM(S): 45 TABLET, ORALLY DISINTEGRATING ORAL at 20:43

## 2020-05-31 RX ADMIN — Medication 0.5 MILLIGRAM(S): at 09:25

## 2020-05-31 RX ADMIN — SENNA PLUS 2 TABLET(S): 8.6 TABLET ORAL at 20:43

## 2020-05-31 RX ADMIN — Medication 0.5 MILLIGRAM(S): at 12:37

## 2020-05-31 RX ADMIN — Medication 0.5 MILLIGRAM(S): at 20:09

## 2020-05-31 RX ADMIN — Medication 1 MILLIGRAM(S): at 09:25

## 2020-05-31 RX ADMIN — PANTOPRAZOLE SODIUM 40 MILLIGRAM(S): 20 TABLET, DELAYED RELEASE ORAL at 09:25

## 2020-05-31 RX ADMIN — Medication 30 MILLIGRAM(S): at 09:23

## 2020-05-31 RX ADMIN — POLYETHYLENE GLYCOL 3350 17 GRAM(S): 17 POWDER, FOR SOLUTION ORAL at 20:43

## 2020-06-01 RX ADMIN — POLYETHYLENE GLYCOL 3350 17 GRAM(S): 17 POWDER, FOR SOLUTION ORAL at 08:07

## 2020-06-01 RX ADMIN — Medication 1 MILLIGRAM(S): at 08:06

## 2020-06-01 RX ADMIN — PANTOPRAZOLE SODIUM 40 MILLIGRAM(S): 20 TABLET, DELAYED RELEASE ORAL at 08:06

## 2020-06-01 RX ADMIN — Medication 0.5 MILLIGRAM(S): at 13:56

## 2020-06-01 RX ADMIN — Medication 30 MILLIGRAM(S): at 08:07

## 2020-06-01 RX ADMIN — Medication 0.5 MILLIGRAM(S): at 08:06

## 2020-06-01 RX ADMIN — Medication 0.5 MILLIGRAM(S): at 20:54

## 2020-06-01 RX ADMIN — MIRTAZAPINE 30 MILLIGRAM(S): 45 TABLET, ORALLY DISINTEGRATING ORAL at 20:54

## 2020-06-01 RX ADMIN — SENNA PLUS 2 TABLET(S): 8.6 TABLET ORAL at 20:54

## 2020-06-02 RX ADMIN — Medication 0.5 MILLIGRAM(S): at 20:20

## 2020-06-02 RX ADMIN — SENNA PLUS 2 TABLET(S): 8.6 TABLET ORAL at 20:20

## 2020-06-02 RX ADMIN — Medication 30 MILLIGRAM(S): at 08:23

## 2020-06-02 RX ADMIN — Medication 1 MILLIGRAM(S): at 08:24

## 2020-06-02 RX ADMIN — PANTOPRAZOLE SODIUM 40 MILLIGRAM(S): 20 TABLET, DELAYED RELEASE ORAL at 08:23

## 2020-06-02 RX ADMIN — Medication 0.5 MILLIGRAM(S): at 14:17

## 2020-06-02 RX ADMIN — MIRTAZAPINE 30 MILLIGRAM(S): 45 TABLET, ORALLY DISINTEGRATING ORAL at 20:20

## 2020-06-02 RX ADMIN — POLYETHYLENE GLYCOL 3350 17 GRAM(S): 17 POWDER, FOR SOLUTION ORAL at 08:24

## 2020-06-02 RX ADMIN — Medication 0.5 MILLIGRAM(S): at 08:24

## 2020-06-03 RX ADMIN — Medication 0.5 MILLIGRAM(S): at 08:59

## 2020-06-03 RX ADMIN — Medication 0.5 MILLIGRAM(S): at 21:01

## 2020-06-03 RX ADMIN — SENNA PLUS 2 TABLET(S): 8.6 TABLET ORAL at 21:01

## 2020-06-03 RX ADMIN — MIRTAZAPINE 30 MILLIGRAM(S): 45 TABLET, ORALLY DISINTEGRATING ORAL at 21:01

## 2020-06-03 RX ADMIN — Medication 30 MILLIGRAM(S): at 08:59

## 2020-06-03 RX ADMIN — PANTOPRAZOLE SODIUM 40 MILLIGRAM(S): 20 TABLET, DELAYED RELEASE ORAL at 08:05

## 2020-06-03 RX ADMIN — Medication 1 MILLIGRAM(S): at 08:59

## 2020-06-03 RX ADMIN — Medication 0.5 MILLIGRAM(S): at 12:40

## 2020-06-04 RX ADMIN — MIRTAZAPINE 30 MILLIGRAM(S): 45 TABLET, ORALLY DISINTEGRATING ORAL at 20:25

## 2020-06-04 RX ADMIN — Medication 30 MILLIGRAM(S): at 09:35

## 2020-06-04 RX ADMIN — Medication 1 MILLIGRAM(S): at 09:35

## 2020-06-04 RX ADMIN — SENNA PLUS 2 TABLET(S): 8.6 TABLET ORAL at 20:25

## 2020-06-04 RX ADMIN — PANTOPRAZOLE SODIUM 40 MILLIGRAM(S): 20 TABLET, DELAYED RELEASE ORAL at 08:03

## 2020-06-05 RX ADMIN — PANTOPRAZOLE SODIUM 40 MILLIGRAM(S): 20 TABLET, DELAYED RELEASE ORAL at 07:36

## 2020-06-05 RX ADMIN — Medication 30 MILLIGRAM(S): at 08:54

## 2020-06-05 RX ADMIN — Medication 1 MILLIGRAM(S): at 08:54

## 2020-06-05 RX ADMIN — SENNA PLUS 2 TABLET(S): 8.6 TABLET ORAL at 20:38

## 2020-06-05 RX ADMIN — Medication 0.5 MILLIGRAM(S): at 20:38

## 2020-06-05 RX ADMIN — MIRTAZAPINE 30 MILLIGRAM(S): 45 TABLET, ORALLY DISINTEGRATING ORAL at 20:38

## 2020-06-06 RX ADMIN — MIRTAZAPINE 30 MILLIGRAM(S): 45 TABLET, ORALLY DISINTEGRATING ORAL at 20:13

## 2020-06-06 RX ADMIN — PANTOPRAZOLE SODIUM 40 MILLIGRAM(S): 20 TABLET, DELAYED RELEASE ORAL at 08:11

## 2020-06-06 RX ADMIN — Medication 1 MILLIGRAM(S): at 09:37

## 2020-06-06 RX ADMIN — Medication 0.5 MILLIGRAM(S): at 20:13

## 2020-06-06 RX ADMIN — POLYETHYLENE GLYCOL 3350 17 GRAM(S): 17 POWDER, FOR SOLUTION ORAL at 09:38

## 2020-06-06 RX ADMIN — POLYETHYLENE GLYCOL 3350 17 GRAM(S): 17 POWDER, FOR SOLUTION ORAL at 21:40

## 2020-06-06 RX ADMIN — Medication 30 MILLIGRAM(S): at 09:37

## 2020-06-06 RX ADMIN — SENNA PLUS 2 TABLET(S): 8.6 TABLET ORAL at 20:14

## 2020-06-06 RX ADMIN — Medication 0.5 MILLIGRAM(S): at 09:37

## 2020-06-07 RX ADMIN — POLYETHYLENE GLYCOL 3350 17 GRAM(S): 17 POWDER, FOR SOLUTION ORAL at 22:09

## 2020-06-07 RX ADMIN — Medication 30 MILLIGRAM(S): at 08:45

## 2020-06-07 RX ADMIN — MIRTAZAPINE 30 MILLIGRAM(S): 45 TABLET, ORALLY DISINTEGRATING ORAL at 20:02

## 2020-06-07 RX ADMIN — Medication 0.5 MILLIGRAM(S): at 20:02

## 2020-06-07 RX ADMIN — Medication 1 MILLIGRAM(S): at 08:45

## 2020-06-07 RX ADMIN — PANTOPRAZOLE SODIUM 40 MILLIGRAM(S): 20 TABLET, DELAYED RELEASE ORAL at 07:13

## 2020-06-07 RX ADMIN — POLYETHYLENE GLYCOL 3350 17 GRAM(S): 17 POWDER, FOR SOLUTION ORAL at 08:45

## 2020-06-07 RX ADMIN — SENNA PLUS 2 TABLET(S): 8.6 TABLET ORAL at 20:02

## 2020-06-07 RX ADMIN — Medication 0.5 MILLIGRAM(S): at 08:45

## 2020-06-08 RX ORDER — SENNA PLUS 8.6 MG/1
2 TABLET ORAL
Qty: 0 | Refills: 0 | DISCHARGE
Start: 2020-06-08

## 2020-06-08 RX ORDER — MIRTAZAPINE 45 MG/1
1 TABLET, ORALLY DISINTEGRATING ORAL
Qty: 0 | Refills: 0 | DISCHARGE
Start: 2020-06-08

## 2020-06-08 RX ORDER — PANTOPRAZOLE SODIUM 20 MG/1
1 TABLET, DELAYED RELEASE ORAL
Qty: 0 | Refills: 0 | DISCHARGE
Start: 2020-06-08

## 2020-06-08 RX ORDER — POLYETHYLENE GLYCOL 3350 17 G/17G
17 POWDER, FOR SOLUTION ORAL
Qty: 0 | Refills: 0 | DISCHARGE
Start: 2020-06-08

## 2020-06-08 RX ORDER — FOLIC ACID 0.8 MG
1 TABLET ORAL
Qty: 0 | Refills: 0 | DISCHARGE
Start: 2020-06-08

## 2020-06-08 RX ADMIN — Medication 0.5 MILLIGRAM(S): at 09:06

## 2020-06-08 RX ADMIN — POLYETHYLENE GLYCOL 3350 17 GRAM(S): 17 POWDER, FOR SOLUTION ORAL at 20:30

## 2020-06-08 RX ADMIN — Medication 1 MILLIGRAM(S): at 09:05

## 2020-06-08 RX ADMIN — Medication 30 MILLIGRAM(S): at 09:05

## 2020-06-08 RX ADMIN — MIRTAZAPINE 30 MILLIGRAM(S): 45 TABLET, ORALLY DISINTEGRATING ORAL at 20:30

## 2020-06-08 RX ADMIN — PANTOPRAZOLE SODIUM 40 MILLIGRAM(S): 20 TABLET, DELAYED RELEASE ORAL at 08:06

## 2020-06-08 RX ADMIN — Medication 0.5 MILLIGRAM(S): at 20:30

## 2020-06-08 RX ADMIN — SENNA PLUS 2 TABLET(S): 8.6 TABLET ORAL at 20:30

## 2020-06-09 RX ADMIN — Medication 0.5 MILLIGRAM(S): at 09:12

## 2020-06-09 RX ADMIN — MIRTAZAPINE 30 MILLIGRAM(S): 45 TABLET, ORALLY DISINTEGRATING ORAL at 20:36

## 2020-06-09 RX ADMIN — Medication 30 MILLIGRAM(S): at 09:12

## 2020-06-09 RX ADMIN — PANTOPRAZOLE SODIUM 40 MILLIGRAM(S): 20 TABLET, DELAYED RELEASE ORAL at 07:48

## 2020-06-09 RX ADMIN — POLYETHYLENE GLYCOL 3350 17 GRAM(S): 17 POWDER, FOR SOLUTION ORAL at 20:36

## 2020-06-09 RX ADMIN — Medication 0.5 MILLIGRAM(S): at 20:36

## 2020-06-09 RX ADMIN — Medication 1 MILLIGRAM(S): at 09:12

## 2020-06-09 RX ADMIN — SENNA PLUS 2 TABLET(S): 8.6 TABLET ORAL at 20:36

## 2020-06-10 VITALS — RESPIRATION RATE: 18 BRPM | TEMPERATURE: 98 F | OXYGEN SATURATION: 100 %

## 2020-06-10 PROCEDURE — 99238 HOSP IP/OBS DSCHRG MGMT 30/<: CPT

## 2020-06-10 RX ADMIN — POLYETHYLENE GLYCOL 3350 17 GRAM(S): 17 POWDER, FOR SOLUTION ORAL at 08:37

## 2020-06-10 RX ADMIN — Medication 30 MILLIGRAM(S): at 08:37

## 2020-06-10 RX ADMIN — Medication 0.5 MILLIGRAM(S): at 08:37

## 2020-06-10 RX ADMIN — Medication 1 MILLIGRAM(S): at 08:37

## 2020-06-10 RX ADMIN — PANTOPRAZOLE SODIUM 40 MILLIGRAM(S): 20 TABLET, DELAYED RELEASE ORAL at 07:36

## 2020-06-23 ENCOUNTER — OUTPATIENT (OUTPATIENT)
Dept: OUTPATIENT SERVICES | Facility: HOSPITAL | Age: 65
LOS: 1 days | Discharge: HOME | End: 2020-06-23

## 2020-06-23 DIAGNOSIS — D11.0 BENIGN NEOPLASM OF PAROTID GLAND: Chronic | ICD-10-CM

## 2020-06-23 DIAGNOSIS — Z98.890 OTHER SPECIFIED POSTPROCEDURAL STATES: Chronic | ICD-10-CM

## 2020-06-23 DIAGNOSIS — Z90.49 ACQUIRED ABSENCE OF OTHER SPECIFIED PARTS OF DIGESTIVE TRACT: Chronic | ICD-10-CM

## 2020-06-24 DIAGNOSIS — F32.9 MAJOR DEPRESSIVE DISORDER, SINGLE EPISODE, UNSPECIFIED: ICD-10-CM

## 2020-06-24 DIAGNOSIS — N20.0 CALCULUS OF KIDNEY: ICD-10-CM

## 2020-06-24 DIAGNOSIS — I82.409 ACUTE EMBOLISM AND THROMBOSIS OF UNSPECIFIED DEEP VEINS OF UNSPECIFIED LOWER EXTREMITY: ICD-10-CM

## 2020-08-09 NOTE — CONSULT NOTE ADULT - SUBJECTIVE AND OBJECTIVE BOX
Patient is a 64y old  Male who presents with a chief complaint of Weakness (08 May 2020 15:16)      HPI:  Patient is a 64/M without any significant known PMHx who presented to the ED c/o worsening weakness over the past 1 month with associated chills that he reports began on 3/30/20. Since that time he reports progressive weakness, fatigue, laying in bed for the majority of the day, difficulty with ambulation, and poor PO intake as he reports he no longer felt he had the desire to eat. He denies any associated SOB, cough, PARDO, but reports difficulty with ambulation as he feels his legs are weak. He reports a fall approx 2 weeks ago which occurred in his apartment which was preceded by dizziness which was described as a feeling of unsteadiness, in which he lost consciousness for an unknown period of time and awoke on the floor. denies any preceding palpitations, SOB, or chest pain. Of note, the patient has not been evaluated by a physician in approx 30 years. He attributes his dizziness and syncope to poor PO intake, often citing during the interview that he has "lost the will to live," and would "be better off dead." He denies any concrete plan to commit suicide, and denies any access to firearms in the home. He reports occasional diarrhea, approx 1x/week, but also has normal formed stools, currently denies any diarrhea. Denies any abdominal pain, cramping, nausea or vomiting. Denies any recent travel or sick contacts.     The date the pt first felt unwell: 3/30/2020  Fever or chills: yes [x  ] chills   no [  ]   - Tmax:   Fatigue, malaise or generalized weakness: yes [ x ]   no [  ]  Shortness of breath/dyspnea: yes [  ]   no [ x ]  Cough: yes [  ]   no [ x ], sputum production: yes [  ]   no [  ]  Blood in sputum: yes [  ]   no [ x ]  Anorexia/po intolerance: yes [x  ]   no [  ]  Chest pain or chest tightness: yes [  ]   no [ x ]  Edema in legs: yes [  ]   no [x  ]  Myalgias: yes [  ]   no [x  ]  Headache: yes [  ]   no [x  ]  Sore throat: yes [  ]   no [ x ]  Rhinorrhea: yes [  ]   no [x  ]  Abd pain: yes [  ]   no [x  ]  Nausea: yes [  ]   no [x  ]  Vomiting: yes [  ]   no [x  ]  Diarrhea: yes [x  ] occasional   no [  ]  Skin rashes: yes [  ]   no [ x ]  Loss of sense of smell/anosmia: yes [  ]   no [ x ]  Conjunctivitis: yes [  ]   no [ x ]  Recent travel: yes [  ]   no [ x ] - Location:   Any sick contacts: yes [  ]   no [ x ]  Close contact with someone confirmed positive with COVID-19 / SARS-CoV2 in the last 14 days: yes [  ]   no [x  ]  Code status: Full    ED Vitals:   T 98.9  BP 92/62 RR 18 SpO2 93% on RA  Labs: CBC wnl, No electrolyte abnormalities, TBili 2.2, troponin negative x 1, blood EtOH, tylenol, ASA level wnl, COVID19 PCr negative.  CT Chest/AP: No traumatic injury. Extensive nonobstructing left renal stones, including a partial staghorn calculus.  CT Head: No acute intracranial hemorrhage  CT C spine: Degenerative changes, no acute fracture  EKG: Sinus tachycardia at 110  In the ED Patient received 2L NS bolus (08 May 2020 00:50)       ROS: states has been in bed for about a week and noted leg pain over several days particularly right    Negative except for:    PAST MEDICAL & SURGICAL HISTORY:  No pertinent past medical history  History of vocal cord polypectomy  Benign parotid tumor: 1983  History of appendectomy: 1965      SOCIAL HISTORY:    FAMILY HISTORY:  Family history of CVA (Father)  Family history of myocardial infarction (Father)  Family history of type 2 diabetes mellitus (Father)      MEDICATIONS  (STANDING):  enoxaparin Injectable 70 milliGRAM(s) SubCutaneous every 12 hours  mirtazapine 15 milliGRAM(s) Oral at bedtime  PARoxetine 20 milliGRAM(s) Oral daily    MEDICATIONS  (PRN):      Allergies    No Known Allergies    Intolerances        Vital Signs Last 24 Hrs  T(C): 36.5 (08 May 2020 12:49), Max: 37.2 (07 May 2020 23:33)  T(F): 97.7 (08 May 2020 12:49), Max: 98.9 (07 May 2020 23:33)  HR: 103 (08 May 2020 12:49) (94 - 104)  BP: 96/60 (08 May 2020 12:49) (92/62 - 96/68)  BP(mean): --  RR: 18 (08 May 2020 12:49) (18 - 18)  SpO2: 94% (08 May 2020 12:49) (93% - 99%)    PHYSICAL EXAM  General: adult in NAD  HEENT: clear oropharynx, anicteric sclera, pink conjunctivae  Neck: supple  CV: normal S1S2 with no murmur rubs or gallops  Lungs: clear to auscultation, no wheezes, no rhales  Abdomen: soft non-tender non-distended, no hepato/splenomegaly  Ext: no clubbing cyanosis or edema. no significant swelling of right leg  Skin: no rashes and no petichiae  Neuro: alert and oriented X3 no focal deficits      LABS:    CBC Full  -  ( 07 May 2020 20:12 )  WBC Count : 10.48 K/uL  RBC Count : 4.68 M/uL  Hemoglobin : 14.4 g/dL  Hematocrit : 41.7 %  Platelet Count - Automated : 140 K/uL  Mean Cell Volume : 89.1 fl  Mean Cell Hemoglobin : 30.8 pg  Mean Cell Hemoglobin Concentration : 34.5 gm/dL  Auto Neutrophil # : 8.58 K/uL  Auto Lymphocyte # : 0.94 K/uL  Auto Monocyte # : 0.83 K/uL  Auto Eosinophil # : 0.07 K/uL  Auto Basophil # : 0.02 K/uL  Auto Neutrophil % : 81.8 %  Auto Lymphocyte % : 9.0 %  Auto Monocyte % : 7.9 %  Auto Eosinophil % : 0.7 %  Auto Basophil % : 0.2 %    05-07    143  |  111<H>  |  24<H>  ----------------------------<  85  3.8   |  21<L>  |  1.20    Ca    7.6<L>      07 May 2020 20:12  Mg     2.2     05-07    TPro  6.0  /  Alb  2.8<L>  /  TBili  2.2<H>  /  DBili  x   /  AST  20  /  ALT  22  /  AlkPhos  39<L>  05-07              BLOOD SMEAR INTERPRETATION:    RADIOLOGY & ADDITIONAL STUDIES:    < from: US Duplex Venous Lower Ext Complete, Bilateral (05.08.20 @ 09:17) >  EXAM:  US DPLX LWR EXT VEINS COMPL BI                            PROCEDURE DATE:  05/08/2020          INTERPRETATION:  CLINICAL INFORMATION: Right leg pain, leg weakness, syncope.    COMPARISON: None available.    TECHNIQUE: Duplex sonography of the BILATERAL LOWER extremity veins with color and spectral Doppler, with and without compression.      FINDINGS:    There is normal compressibility of the bilateral common femoral, femoral and left popliteal veins. The right popliteal vein is enlarged,incompressible and demonstrates intraluminal echogenic material. Similar findings are noted in the right posterior tibial and peroneal veins.    Doppler examination shows normal spontaneous and phasic flow.    Left calf veins are patent.    IMPRESSION:   Above and below the knee acute deep venous thrombosis on the right.  No evidence of deep venous thrombosis in the left lower extremity.    Findings discussed with DR. Brian Pritchett on 5/8/2020 9:34 AM with read back.                  ALISA GUZMAN M.D., ATTENDING RADIOLOGIST  This document has been electronically signed. May  8 2020  9:34AM              < end of copied text >    < from: CT Chest No Cont (05.07.20 @ 20:34) >    EXAM:  CT ABDOMEN AND PELVIS                            PROCEDURE DATE:  05/07/2020          INTERPRETATION:  CLINICAL INFORMATION: Status post fall    COMPARISON: None.    PROCEDURE:   CT of the Chest, Abdomen and Pelvis was performed without intravenous contrast.   Intravenous contrast: None.  Oral contrast: None.  Sagittal and coronal reformats were performed.    FINDINGS:    CHEST:     LUNGS AND LARGE AIRWAYS: Patent central airways. No pulmonary nodules. Mild bilateral lower lobe subsegmental atelectasis.  PLEURA: No pleural effusion.  VESSELS: Normal caliber aorta. Mild coronary artery calcification.  HEART: Heart size is normal. No pericardial effusion.  MEDIASTINUM AND SELVIN: No lymphadenopathy.  CHEST WALL AND LOWER NECK: Within normal limits.    ABDOMEN AND PELVIS:    LIVER: Within normal limits.  BILE DUCTS: Normal caliber.  GALLBLADDER: Multiple gallstones.  SPLEEN: Within normal limits.  PANCREAS: Within normal limits.  ADRENALS: Within normal limits.  KIDNEYS/URETERS: 1.9 cm nonobstructing left upper pole renal stone. Partial staghorn left renal calculus with multiple small stones versus fragmented larger stone in the left renal pelvis. No hydronephrosis. 2.7 cm hypodense lesion in the posterior aspect of the left kidney which is unable to be characterized on this unenhanced CT scan.    BLADDER: Within normal limits.  REPRODUCTIVE ORGANS: Prostate within normal limits.    BOWEL: No bowel obstruction. Appendix not identified. Mild colonic diverticulosis.  PERITONEUM: No ascites or hemoperitoneum.  VESSELS: Mild atherosclerotic arterial calcifications.  RETROPERITONEUM/LYMPH NODES: No lymphadenopathy.    ABDOMINAL WALL: Within normal limits.  BONES: No fractures.    IMPRESSION:     No traumatic injury.  Extensive nonobstructing left renal stones, including a partial staghorn calculus.                    WILLIAMS GARDNER M.D.,ATTENDING RADIOLOGIST  This document has been electronically signed. May  7 2020  9:05PM                < end of copied text > (3) adequate

## 2021-01-01 NOTE — BEHAVIORAL HEALTH ASSESSMENT NOTE - NS[facility]CONSULT[facility]S_PSY_A_CORE FT
<----  Click to enter wet read (wet read can be reviewed in the Results section) PAxil 20 mg po daily, Remeron 15 mg po at HS

## 2021-08-25 NOTE — BEHAVIORAL HEALTH ASSESSMENT NOTE - NS ED [facility] MED ROS GENITOURINARY
Patient brought back to IR holding for CTA prep. Vitals stable with heart rate of 62. Nitro patches x2 applied. Diffusix IV placed. Prep complete.    Patient brought to CT 1 for scan. Scan completed, patient brought back to IR holding. Vitals remain stable. Nitro patches removed, IV removed. Patient educated on importance of drinking fluids today, drink at least 8 glasses of water. D/C'd to lobby from radiology with wife.  
No complaints

## 2022-12-19 NOTE — PROGRESS NOTE ADULT - PROBLEM SELECTOR PLAN 10
Post-Op Assessment Note    CV Status:  Stable  Pain Score: 0    Pain management: adequate     Mental Status:  Arousable   Hydration Status:  Euvolemic   PONV Controlled:  Controlled   Airway Patency:  Patent      Post Op Vitals Reviewed: Yes      Staff: CRNA         No notable events documented      BP   126/74   Temp  97 4   Pulse 78   Resp 16   SpO2 97 - disposition Discussed with patients brother in law. Pt is currently living in an unsafe environment with no support system. Brother in law is in process of cleaning house and ordering furniture.   -Pt may require inpatient psych after hospitalization. F/u psych reccs

## 2024-01-15 NOTE — PROGRESS NOTE ADULT - PROBLEM SELECTOR PROBLEM 7
Prudencio
Onychomycosis
Verruca plantaris
Verruca plantaris
Onychomycosis